# Patient Record
Sex: FEMALE | Race: BLACK OR AFRICAN AMERICAN | Employment: UNEMPLOYED | URBAN - METROPOLITAN AREA
[De-identification: names, ages, dates, MRNs, and addresses within clinical notes are randomized per-mention and may not be internally consistent; named-entity substitution may affect disease eponyms.]

---

## 2017-01-09 ENCOUNTER — HOSPITAL ENCOUNTER (OUTPATIENT)
Dept: RADIOLOGY | Facility: MEDICAL CENTER | Age: 42
End: 2017-01-09
Attending: NURSE PRACTITIONER
Payer: MEDICAID

## 2017-01-09 DIAGNOSIS — M67.472 GANGLION CYST OF LEFT FOOT: ICD-10-CM

## 2017-01-09 DIAGNOSIS — M79.672 LEFT FOOT PAIN: ICD-10-CM

## 2017-01-09 DIAGNOSIS — M06.9 RHEUMATOID ARTHRITIS INVOLVING LEFT FOOT, UNSPECIFIED RHEUMATOID FACTOR PRESENCE: ICD-10-CM

## 2017-01-09 PROCEDURE — A9579 GAD-BASE MR CONTRAST NOS,1ML: HCPCS | Performed by: NURSE PRACTITIONER

## 2017-01-09 PROCEDURE — 700117 HCHG RX CONTRAST REV CODE 255: Performed by: NURSE PRACTITIONER

## 2017-01-09 PROCEDURE — 73720 MRI LWR EXTREMITY W/O&W/DYE: CPT | Mod: LT

## 2017-01-09 RX ADMIN — GADODIAMIDE 15 ML: 287 INJECTION INTRAVENOUS at 09:35

## 2017-05-18 ENCOUNTER — APPOINTMENT (OUTPATIENT)
Dept: RADIOLOGY | Facility: MEDICAL CENTER | Age: 42
End: 2017-05-18
Attending: EMERGENCY MEDICINE
Payer: MEDICAID

## 2017-05-18 ENCOUNTER — HOSPITAL ENCOUNTER (EMERGENCY)
Facility: MEDICAL CENTER | Age: 42
End: 2017-05-18
Attending: EMERGENCY MEDICINE
Payer: MEDICAID

## 2017-05-18 VITALS
SYSTOLIC BLOOD PRESSURE: 110 MMHG | TEMPERATURE: 97.9 F | OXYGEN SATURATION: 100 % | DIASTOLIC BLOOD PRESSURE: 72 MMHG | BODY MASS INDEX: 20.37 KG/M2 | HEIGHT: 71 IN | WEIGHT: 145.5 LBS | RESPIRATION RATE: 16 BRPM | HEART RATE: 98 BPM

## 2017-05-18 DIAGNOSIS — M06.9 RHEUMATOID ARTHRITIS INVOLVING MULTIPLE SITES, UNSPECIFIED RHEUMATOID FACTOR PRESENCE: ICD-10-CM

## 2017-05-18 DIAGNOSIS — R22.32 FOREARM MASS, LEFT: ICD-10-CM

## 2017-05-18 PROCEDURE — 700102 HCHG RX REV CODE 250 W/ 637 OVERRIDE(OP): Performed by: EMERGENCY MEDICINE

## 2017-05-18 PROCEDURE — 73070 X-RAY EXAM OF ELBOW: CPT | Mod: LT

## 2017-05-18 PROCEDURE — 99284 EMERGENCY DEPT VISIT MOD MDM: CPT

## 2017-05-18 RX ORDER — METHYLPREDNISOLONE 4 MG/1
TABLET ORAL
Qty: 30 TAB | Refills: 0 | Status: SHIPPED | OUTPATIENT
Start: 2017-05-18 | End: 2017-06-15

## 2017-05-18 RX ORDER — METHYLPREDNISOLONE 4 MG/1
4 TABLET ORAL DAILY
Status: DISCONTINUED | OUTPATIENT
Start: 2017-05-18 | End: 2017-05-18 | Stop reason: HOSPADM

## 2017-05-18 RX ADMIN — METHYLPREDNISOLONE 4 MG: 4 TABLET ORAL at 10:02

## 2017-05-18 ASSESSMENT — PAIN SCALES - GENERAL
PAINLEVEL_OUTOF10: 4
PAINLEVEL_OUTOF10: 9

## 2017-05-18 NOTE — ED AVS SNAPSHOT
5/18/2017    Isabell Serrano  [address] [facility]  [place] [address]    Dear Isabell:    On license of [facility] wants to ensure your discharge home is safe and you or your loved ones have had all of your questions answered regarding your care after you leave the hospital.    Below is a list of resources and contact information should you have any questions regarding your hospital stay, follow-up instructions, or active medical symptoms.    Questions or Concerns Regarding… Contact   Medical Questions Related to Your Discharge  (7 days a week, 8am-5pm) Contact a Nurse Care Coordinator   [phone]   Medical Questions Not Related to Your Discharge  (24 hours a day / 7 days a week)  Contact the Nurse Health Line   [phone]    Medications or Discharge Instructions Refer to your discharge packet   or contact your [facility] Primary Care Provider   [phone]   Follow-up Appointment(s) Schedule your appointment via UrgentRx   or contact Scheduling [phone]   Billing Review your statement via UrgentRx  or contact Billing [phone]   Medical Records Review your records via UrgentRx   or contact Medical Records [phone]     You may receive a telephone call within two days of discharge. This call is to make certain you understand your discharge instructions and have the opportunity to have any questions answered. You can also easily access your medical information, test results and upcoming appointments via the UrgentRx free online health management tool. You can learn more and sign up at [employer]/UrgentRx. For assistance setting up your UrgentRx account, please call [phone].    Once again, we want to ensure your discharge home is safe and that you have a clear understanding of any next steps in your care. If you have any questions or concerns, please do not hesitate to contact us, we are here for you. Thank you for choosing [facility] for your healthcare needs.    Sincerely,    Your [facility] Healthcare Team

## 2017-05-18 NOTE — ED NOTES
Pt amb to triage.  Chief Complaint   Patient presents with   • Medication Refill     methylprednisolone 4mg   • Elbow Pain     left, +lump x1mo     States she does not have a current RA MD. Trying to get set up thru [facility].

## 2017-05-18 NOTE — ED AVS SNAPSHOT
Home Care Instructions                                                                                                                Isabell Serrano   MRN: [id number]    Department:  [facility], Emergency Dept   Date of Visit:  5/18/2017            [facility], Emergency Dept    [address] [place] [address]    Phone:  [phone]      You were seen by     James Hines M.D.      Your Diagnosis Was     Rheumatoid arthritis involving multiple sites, unspecified rheumatoid factor presence (CMS-HCC)     M06.9       These are the medications you received during your hospitalization from 05/18/2017 0848 to 05/18/2017 1051     Date/Time Order Dose Route Action    05/18/2017 1002 methylPREDNISolone (MEDROL) tablet 4 mg 4 mg Oral Given      Follow-up Information     1. Follow up with [facility], Emergency Dept.    Specialty:  Emergency Medicine    Why:  If symptoms worsen    Contact information    [address] [place] [address] [phone]        2. Schedule an appointment as soon as possible for a visit with Antonio Ruelas M.D..    Specialty:  Rheumatology    Why:  rheumatologist    Contact information    [address]  [facility] [id number] [phone]          3. Schedule an appointment as soon as possible for a visit with [facility].    Contact information    [address] [address] [phone]      Medication Information     Review all of your home medications and newly ordered medications with your primary doctor and/or pharmacist as soon as possible. Follow medication instructions as directed by your doctor and/or pharmacist.     Please keep your complete medication list with you and share with your physician. Update the information when medications are discontinued, doses are changed, or new medications (including over-the-counter products) are added; and carry medication  information at all times in the event of emergency situations.               Medication List      ASK your doctor about these medications        Instructions    Morning Afternoon Evening Bedtime    hydroxychloroquine 200 MG Tabs   Commonly known as:  PLAQUENIL        Take 2 Tabs by mouth every day.   Dose:  400 mg                        methimazole 5 MG Tabs   Commonly known as:  TAPAZOLE        Take 1 Tab by mouth 2 Times a Day.   Dose:  5 mg                        * methylPREDNISolone 4 MG Tabs   What changed:  Another medication with the same name was added. Make sure you understand how and when to take each.   Last time this was given:  4 mg on 5/18/2017 10:02 AM   Commonly known as:  MEDROL   Ask about: Which instructions should I use?        Take 1 Tab by mouth every day. TAKE 1 TAB BY MOUTH EVERY DAY.   Dose:  4 mg                        * methylPREDNISolone 4 MG Tabs   What changed:  Another medication with the same name was added. Make sure you understand how and when to take each.   Last time this was given:  4 mg on 5/18/2017 10:02 AM   Commonly known as:  MEDROL   Ask about: Which instructions should I use?        Take 1 Tab by mouth every day.   Dose:  4 mg                        * methylPREDNISolone 4 MG Tabs   What changed:  You were already taking a medication with the same name, and this prescription was added. Make sure you understand how and when to take each.   Last time this was given:  4 mg on 5/18/2017 10:02 AM   Commonly known as:  MEDROL   Ask about: Which instructions should I use?        Take 1 tab orally each day                        * Notice:  This list has 3 medication(s) that are the same as other medications prescribed for you. Read the directions carefully, and ask your doctor or other care provider to review them with you.         Where to Get Your Medications      These medications were sent to [facility][id number] - [place] - [address]  [address]  [address]     Phone:  [phone]    - methylPREDNISolone 4 MG Tabs            Procedures and tests performed during your visit     DX-ELBOW-LIMITED 2- LEFT        Discharge Instructions       Rheumatoid Arthritis  Rheumatoid arthritis is a long-term (chronic) inflammatory disease that causes pain, swelling, and stiffness of the joints. It can affect the entire body, including the eyes and lungs. The effects of rheumatoid arthritis vary widely among those with the condition.  CAUSES  The cause of rheumatoid arthritis is not known. It tends to run in families and is more common in women. Certain cells of the body's natural defense system (immune system) do not work properly and begin to attack healthy joints. It primarily involves the connective tissue that lines the joints (synovial membrane). This can cause damage to the joint.  SYMPTOMS  · Pain, stiffness, swelling, and decreased motion of many joints, especially in the hands and feet.  · Stiffness that is worse in the morning. It may last 1-2 hours or longer.  · Numbness and tingling in the hands.  · Fatigue.  · Loss of appetite.  · Weight loss.  · Low-grade fever.  · Dry eyes and mouth.  · Firm lumps (rheumatoid nodules) that grow beneath the skin in areas such as the elbows and hands.  DIAGNOSIS  Diagnosis is based on the symptoms described, an exam, and blood tests. Sometimes, X-rays are helpful.  TREATMENT  The goals of treatment are to relieve pain, reduce inflammation, and to slow down or stop joint damage and disability. Methods vary and may include:  · Maintaining a balance of rest, exercise, and proper nutrition.  · Your health care provider may adjust your medicines every 3 months until treatment goals are reached. Common medicines include:  ¨ Pain relievers (analgesics).  ¨ Corticosteroids and nonsteroidal anti-inflammatory drugs (NSAIDs) to reduce inflammation.  ¨ Disease-modifying antirheumatic drugs (DMARDs) to try to slow the course of the  disease.  ¨ Biologic response modifiers to reduce inflammation and damage.  · Physical therapy and occupational therapy.  · Surgery for patients with severe joint damage. Joint replacement or fusing of joints may be needed.  · Routine monitoring and ongoing care, such as office visits, blood and urine tests, and X-rays.  Your health care provider will work with you to identify the best treatment option for you, based on an assessment of the overall disease activity in your body.  HOME CARE INSTRUCTIONS  · Remain physically active and reduce activity when the disease gets worse.  · Eat a well-balanced diet.  · Put heat on affected joints when you wake up and before activities. Keep the heat on the affected joint for as long as directed by your health care provider.  · Put ice on affected joints following activities or exercising.  ¨ Put ice in a plastic bag.  ¨ Place a towel between your skin and the bag.  ¨ Leave the ice on for 15-20 minutes, 3-4 times per day, or as directed by your health care provider.  · Take medicines and supplements only as directed by your health care provider.  · Use splints as directed by your health care provider. Splints help maintain joint position and function.  · Do not sleep with pillows under your knees. This may lead to spasms.  · Participate in a self-management program to keep current with the latest treatment and coping skills.  SEEK IMMEDIATE MEDICAL CARE IF:  · You have fainting episodes.  · You have periods of extreme weakness.  · You rapidly develop a hot, painful joint that is more severe than usual joint aches.  · You have chills.  · You have a fever.  FOR MORE INFORMATION  · American College of Rheumatology: www.rheumatology.org  · Arthritis Foundation: www.arthritis.org     This information is not intended to replace advice given to you by your health care provider. Make sure you discuss any questions you have with your health care provider.     Document Released:  12/15/2001 Document Revised: 01/08/2016 Document Reviewed: 01/23/2013  Elsevier Interactive Patient Education ©2016 Elsevier Inc.            Patient Information     Patient Information    Following emergency treatment: all patient requiring follow-up care must return either to a private physician or a clinic if your condition worsens before you are able to obtain further medical attention, please return to the emergency room.     Billing Information    At [facility], we work to make the billing process streamlined for our patients.  Our Representatives are here to answer any questions you may have regarding your hospital bill.  If you have insurance coverage and have supplied your insurance information to us, we will submit a claim to your insurer on your behalf.  Should you have any questions regarding your bill, we can be reached online or by phone as follows:  Online: You are able pay your bills online or live chat with our representatives about any billing questions you may have. We are here to help Monday - Friday from 8:00am to 7:30pm and 9:00am - 12:00pm on Saturdays.  Please visit [email or web address]  for more information.   Phone:  [phone] or [phone]    Please note that your emergency physician, surgeon, pathologist, radiologist, anesthesiologist, and other specialists are not employed by [facility] and will therefore bill separately for their services.  Please contact them directly for any questions concerning their bills at the numbers below:     Emergency Physician Services:  [phone]  [facility]:  [phone]  [facility]:  [phone]  [facility]:  [phone]    1. Your final bill may vary from the amount quoted upon discharge if all procedures are not complete at that time, or if your doctor has additional procedures of which we are not aware. You will receive an additional bill if you return to the  Emergency Department at [facility] for suture removal regardless of the facility of which the sutures were placed.     2. Please arrange for settlement of this account at the emergency registration.    3. All self-pay accounts are due in full at the time of treatment.  If you are unable to meet this obligation then payment is expected within 4-5 days.     4. If you have had radiology studies (CT, X-ray, Ultrasound, MRI), you have received a preliminary result during your emergency department visit. Please contact the radiology department [phone] to receive a copy of your final result. Please discuss the Final result with your primary physician or with the follow up physician provided.     Crisis Hotline:  [place] Crisis Hotline:  0-600-KEXRXZI or 1-601.345.4964  [place] Crisis Hotline:    1-883.156.4188 or 852-405-3294         ED Discharge Follow Up Questions    1. In order to provide you with very good care, we would like to follow up with a phone call in the next few days.  May we have your permission to contact you?     YES /  NO    2. What is the best phone number to call you? (       )_____-__________    3. What is the best time to call you?      Morning  /  Afternoon  /  Evening                   Patient Signature:  ____________________________________________________________    Date:  ____________________________________________________________

## 2017-05-18 NOTE — DISCHARGE INSTRUCTIONS
Rheumatoid Arthritis  Rheumatoid arthritis is a long-term (chronic) inflammatory disease that causes pain, swelling, and stiffness of the joints. It can affect the entire body, including the eyes and lungs. The effects of rheumatoid arthritis vary widely among those with the condition.  CAUSES  The cause of rheumatoid arthritis is not known. It tends to run in families and is more common in women. Certain cells of the body's natural defense system (immune system) do not work properly and begin to attack healthy joints. It primarily involves the connective tissue that lines the joints (synovial membrane). This can cause damage to the joint.  SYMPTOMS  · Pain, stiffness, swelling, and decreased motion of many joints, especially in the hands and feet.  · Stiffness that is worse in the morning. It may last 1-2 hours or longer.  · Numbness and tingling in the hands.  · Fatigue.  · Loss of appetite.  · Weight loss.  · Low-grade fever.  · Dry eyes and mouth.  · Firm lumps (rheumatoid nodules) that grow beneath the skin in areas such as the elbows and hands.  DIAGNOSIS  Diagnosis is based on the symptoms described, an exam, and blood tests. Sometimes, X-rays are helpful.  TREATMENT  The goals of treatment are to relieve pain, reduce inflammation, and to slow down or stop joint damage and disability. Methods vary and may include:  · Maintaining a balance of rest, exercise, and proper nutrition.  · Your health care provider may adjust your medicines every 3 months until treatment goals are reached. Common medicines include:  ¨ Pain relievers (analgesics).  ¨ Corticosteroids and nonsteroidal anti-inflammatory drugs (NSAIDs) to reduce inflammation.  ¨ Disease-modifying antirheumatic drugs (DMARDs) to try to slow the course of the disease.  ¨ Biologic response modifiers to reduce inflammation and damage.  · Physical therapy and occupational therapy.  · Surgery for patients with severe joint damage. Joint replacement or fusing of  joints may be needed.  · Routine monitoring and ongoing care, such as office visits, blood and urine tests, and X-rays.  Your health care provider will work with you to identify the best treatment option for you, based on an assessment of the overall disease activity in your body.  HOME CARE INSTRUCTIONS  · Remain physically active and reduce activity when the disease gets worse.  · Eat a well-balanced diet.  · Put heat on affected joints when you wake up and before activities. Keep the heat on the affected joint for as long as directed by your health care provider.  · Put ice on affected joints following activities or exercising.  ¨ Put ice in a plastic bag.  ¨ Place a towel between your skin and the bag.  ¨ Leave the ice on for 15-20 minutes, 3-4 times per day, or as directed by your health care provider.  · Take medicines and supplements only as directed by your health care provider.  · Use splints as directed by your health care provider. Splints help maintain joint position and function.  · Do not sleep with pillows under your knees. This may lead to spasms.  · Participate in a self-management program to keep current with the latest treatment and coping skills.  SEEK IMMEDIATE MEDICAL CARE IF:  · You have fainting episodes.  · You have periods of extreme weakness.  · You rapidly develop a hot, painful joint that is more severe than usual joint aches.  · You have chills.  · You have a fever.  FOR MORE INFORMATION  · American College of Rheumatology: www.rheumatology.org  · Arthritis Foundation: www.arthritis.org     This information is not intended to replace advice given to you by your health care provider. Make sure you discuss any questions you have with your health care provider.     Document Released: 12/15/2001 Document Revised: 01/08/2016 Document Reviewed: 01/23/2013  Elsevier Interactive Patient Education ©2016 [employer]

## 2017-05-18 NOTE — ED AVS SNAPSHOT
ROBLOX Access Code: [email or web address]  Expires: 6/17/2017  9:47 AM    ROBLOX  A secure, online tool to manage your health information     [employer]’s ROBLOX® is a secure, online tool that connects you to your personalized health information from the privacy of your home -- day or night - making it very easy for you to manage your healthcare. Once the activation process is completed, you can even access your medical information using the ROBLOX marisa, which is available for free in the Apple Marisa store or Google Play store.     ROBLOX provides the following levels of access (as shown below):   My Chart Features   [facility] Primary Care Doctor [facility]  Specialists [facility]  Urgent  Care Non-[facility]  Primary Care  Doctor   Email your healthcare team securely and privately 24/7 X X X X   Manage appointments: schedule your next appointment; view details of past/upcoming appointments X      Request prescription refills. X      View recent personal medical records, including lab and immunizations X X X X   View health record, including health history, allergies, medications X X X X   Read reports about your outpatient visits, procedures, consult and ER notes X X X X   See your discharge summary, which is a recap of your hospital and/or ER visit that includes your diagnosis, lab results, and care plan. X X       How to register for ROBLOX:  1. Go to  [email or web address].  2. Click on the Sign Up Now box, which takes you to the New Member Sign Up page. You will need to provide the following information:  a. Enter your ROBLOX Access Code exactly as it appears at the top of this page. (You will not need to use this code after you’ve completed the sign-up process. If you do not sign up before the expiration date, you must request a new code.)   b. Enter your date of birth.   c. Enter your home email address.   d. Click Submit, and follow the next screen’s instructions.  3. Create a ROBLOX ID. This will be your ROBLOX  login ID and cannot be changed, so think of one that is secure and easy to remember.  4. Create a [employer] password. You can change your password at any time.  5. Enter your Password Reset Question and Answer. This can be used at a later time if you forget your password.   6. Enter your e-mail address. This allows you to receive e-mail notifications when new information is available in [employer].  7. Click Sign Up. You can now view your health information.    For assistance activating your [employer] account, call [phone]

## 2017-05-18 NOTE — ED PROVIDER NOTES
"ED Provider Note    Scribed for James Hines M.D. by Lorna Costa. 5/18/2017, 9:16 AM.    Primary care provider: Rosa Sams M.D.  Means of arrival: Walk-in  History obtained from: Patient  History limited by: None    CHIEF COMPLAINT  Chief Complaint   Patient presents with   • Medication Refill     methylprednisolone 4mg   • Elbow Pain     left, +lump x1mo       HPI  Isabell Serrano is a 42 y.o. Female with a history of rheumatid arthritis who presents to the Emergency Department for a medication refill of methylprednisolone. She use to see Dr. Mathis for her rheumatoid arthritis but he recently passed away.The patient is having a difficult time trying to find a new physician since. She states that all of her pain is \"acting up and is very aggressive.\" The patient also complains of a lump to her left elbow but denies any trauma. She reports that the bump has been present for one month.     REVIEW OF SYSTEMS  Pertinent positives include lump to left elbow.   Pertinent negatives include no falls.      PAST MEDICAL HISTORY   has a past medical history of Arthritis, rheumatoid (CMS-[facility]); Arthritis; and RA (rheumatoid arthritis) (CMS-[facility]) (11/5/2013).    SURGICAL HISTORY   has past surgical history that includes mammoplasty augmentation (7/12/2011).    SOCIAL HISTORY  Social History   Substance Use Topics   • Smoking status: Never Smoker    • Smokeless tobacco: Never Used   • Alcohol Use: 0.5 oz/week     1 Standard drinks or equivalent per week      Comment: Once a month      History   Drug Use No       FAMILY HISTORY  Family History   Problem Relation Age of Onset   • Cancer Father      throat/prostate   • Rheumatologic Disease Paternal Aunt    • Other Paternal Aunt      MS   • Rheumatologic Disease Paternal Aunt    • Arthritis Paternal Aunt      RA       CURRENT MEDICATIONS  Home Medications     Reviewed by Maria Guadalupe Glynn R.N. (Registered Nurse) on 05/18/17 at 0914  Med List Status: Not " "Addressed    Medication Last Dose Status    hydroxychloroquine (PLAQUENIL) 200 MG Tab  Active    methimazole (TAPAZOLE) 5 MG Tab 5/18/2017 Active    methylPREDNISolone (MEDROL) 4 MG Tab 5/10/2017 Active    methylPREDNISolone (MEDROL) 4 MG Tab  Active                ALLERGIES  No Known Allergies    PHYSICAL EXAM  VITAL SIGNS: /79 mmHg  Pulse 110  Temp(Src) 36.6 °C (97.9 °F)  Resp 16  Ht 1.803 m (5' 11\")  Wt 66 kg (145 lb 8.1 oz)  BMI 20.30 kg/m2  SpO2 99%  LMP 05/07/2017    Nursing note and vitals reviewed.  Constitutional: No distress.   HENT: Head is atraumatic. Oropharynx is moist.   Eyes: Conjunctivae are normal. Pupils are equal, round, and reactive to light.   Cardiovascular: Normal peripheral perfusion  Respiratory: No respiratory distress.   Musculoskeletal: Normal range of motion. Firm, bony, prominence just distal to olecranon, non-tender, no erythema, no flexions.   Neurological: Alert. No focal deficits noted.    Skin: No rash.   Psych: Appropriate for clinical situation     DIAGNOSTIC STUDIES / PROCEDURES    RADIOLOGY  DX-ELBOW-LIMITED 2- LEFT   Final Result      No evidence of erosive arthropathy.      Elbow joint effusion.      Focal soft tissue swelling of the proximal dorsal forearm.        The radiologist's interpretation of all radiological studies have been reviewed by me.    COURSE & MEDICAL DECISION MAKING  Nursing notes, VS, PMSFHx reviewed in chart.      9:16 AM - Patient seen and examined at bedside. Patient will be treated with 4mg Methylprednisolone. Ordered DX-elbow limited 2-left to evaluate her symptoms.      X-ray demonstrates no evidence of bony involvement. This appears to be a soft tissue mass. Possibly related to rheumatoid. I will refer the patient to a local dermatologist. In addition the patient is to establish primary care.        DISPOSITION:  The patient's PMPaware/narcotic history was reviewed prior to prescription - and there is no evidence of narcotic " abuse.    Patient will be discharged home in stable condition.             FOLLOW UP:  [facility], Emergency Dept  [address] [address] [phone]    If symptoms worsen    Antonio Ruelas M.D.  [address]  [address]  [place] [address]  [phone]    Schedule an appointment as soon as possible for a visit  rheumatologist    [address] [address]  [phone]  Schedule an appointment as soon as possible for a visit        OUTPATIENT MEDICATIONS:  Discharge Medication List as of 5/18/2017 10:51 AM      START taking these medications    Details   !! methylPREDNISolone (MEDROL) 4 MG Tab Take 1 tab orally each day, Disp-30 Tab, R-0, Normal       !! - Potential duplicate medications found. Please discuss with provider.          The patient was discharged home with an information sheet on rheumatoid arthritis and told to return immediately for any signs or symptoms listed.  The patient agreed to the discharge precautions and follow-up plan which is documented in EPIC.    FINAL IMPRESSION  1. Rheumatoid arthritis involving multiple sites, unspecified rheumatoid factor presence (CMS-[facility])    2. Forearm mass, left          Lorna ESQUEDA (Scribe), am scribing for, and in the presence of, James Hines M.D..    Electronically signed by: Lorna Costa (Scribe), 5/18/2017    James ESQUEDA M.D. personally performed the services described in this documentation, as scribed by Lorna Costa in my presence, and it is both accurate and complete.    The note accurately reflects work and decisions made by me.  James Hines  5/18/2017  2:45 PM

## 2017-05-19 ENCOUNTER — PATIENT OUTREACH (OUTPATIENT)
Dept: HEALTH INFORMATION MANAGEMENT | Facility: OTHER | Age: 42
End: 2017-05-19

## 2017-05-19 NOTE — PROGRESS NOTES
· Placed discharge outreach phone call to patient s/p ER discharge 5/18/17.  Left voicemail with my contact information and instructions to return my phone call.

## 2017-06-15 ENCOUNTER — HOSPITAL ENCOUNTER (EMERGENCY)
Facility: MEDICAL CENTER | Age: 42
End: 2017-06-15
Attending: EMERGENCY MEDICINE
Payer: MEDICAID

## 2017-06-15 ENCOUNTER — PATIENT OUTREACH (OUTPATIENT)
Dept: HEALTH INFORMATION MANAGEMENT | Facility: OTHER | Age: 42
End: 2017-06-15

## 2017-06-15 VITALS
HEART RATE: 105 BPM | BODY MASS INDEX: 20.96 KG/M2 | RESPIRATION RATE: 18 BRPM | OXYGEN SATURATION: 98 % | DIASTOLIC BLOOD PRESSURE: 74 MMHG | SYSTOLIC BLOOD PRESSURE: 119 MMHG | WEIGHT: 146.39 LBS | HEIGHT: 70 IN | TEMPERATURE: 98.2 F

## 2017-06-15 DIAGNOSIS — M05.9 SEROPOSITIVE RHEUMATOID ARTHRITIS (HCC): ICD-10-CM

## 2017-06-15 DIAGNOSIS — Z76.0 MEDICATION REFILL: ICD-10-CM

## 2017-06-15 DIAGNOSIS — F41.8 SITUATIONAL ANXIETY: ICD-10-CM

## 2017-06-15 PROCEDURE — 99283 EMERGENCY DEPT VISIT LOW MDM: CPT

## 2017-06-15 RX ORDER — METHYLPREDNISOLONE 4 MG/1
4 TABLET ORAL
Qty: 30 TAB | Refills: 0 | Status: SHIPPED | OUTPATIENT
Start: 2017-06-15 | End: 2019-02-09

## 2017-06-15 ASSESSMENT — PAIN SCALES - GENERAL: PAINLEVEL_OUTOF10: 10

## 2017-06-15 NOTE — ED AVS SNAPSHOT
Venuemob Access Code: [email or web address]  Expires: 6/17/2017  9:47 AM    Venuemob  A secure, online tool to manage your health information     [email or web address]’s Venuemob® is a secure, online tool that connects you to your personalized health information from the privacy of your home -- day or night - making it very easy for you to manage your healthcare. Once the activation process is completed, you can even access your medical information using the Venuemob marisa, which is available for free in the Apple Marisa store or Google Play store.     Venuemob provides the following levels of access (as shown below):   My Chart Features   [facility] Primary Care Doctor [facility]  Specialists [facility]  Urgent  Care Non-[facility]  Primary Care  Doctor   Email your healthcare team securely and privately 24/7 X X X X   Manage appointments: schedule your next appointment; view details of past/upcoming appointments X      Request prescription refills. X      View recent personal medical records, including lab and immunizations X X X X   View health record, including health history, allergies, medications X X X X   Read reports about your outpatient visits, procedures, consult and ER notes X X X X   See your discharge summary, which is a recap of your hospital and/or ER visit that includes your diagnosis, lab results, and care plan. X X       How to register for Venuemob:  1. Go to  [email or web address].  2. Click on the Sign Up Now box, which takes you to the New Member Sign Up page. You will need to provide the following information:  a. Enter your Venuemob Access Code exactly as it appears at the top of this page. (You will not need to use this code after you’ve completed the sign-up process. If you do not sign up before the expiration date, you must request a new code.)   b. Enter your date of birth.   c. Enter your home email address.   d. Click Submit, and follow the next screen’s instructions.  3. Create a Venuemob ID. This will be your Venuemob  login ID and cannot be changed, so think of one that is secure and easy to remember.  4. Create a [employer]ed password. You can change your password at any time.  5. Enter your Password Reset Question and Answer. This can be used at a later time if you forget your password.   6. Enter your e-mail address. This allows you to receive e-mail notifications when new information is available in Filecubed.  7. Click Sign Up. You can now view your health information.    For assistance activating your Filecubed account, call [phone]

## 2017-06-15 NOTE — ED NOTES
"Pt has hx of RA. Ran out of Solumedrol last Friday. C/O increase in generalized pain. \"It was so bad yesterday I took like 16 Aleve just to get through.\" States no current PCP in the area.   "

## 2017-06-15 NOTE — DISCHARGE INSTRUCTIONS
Follow-up with the doctor as scheduled.  I think he needed an outpatient workup and to be tapered off her steroids    Medicine Refill at the Emergency Department  We have refilled your medicine today, but it is best for you to get refills through your primary health care provider's office. In the future, please plan ahead so you do not need to get refills from the emergency department.  If the medicine we refilled was a maintenance medicine, you may have received only enough to get you by until you are able to see your regular health care provider.     This information is not intended to replace advice given to you by your health care provider. Make sure you discuss any questions you have with your health care provider.     Document Released: 04/05/2005 Document Revised: 01/08/2016 Document Reviewed: 03/27/2015  [employer] Interactive Patient Education ©2016 [employer]      Rheumatoid Arthritis  Rheumatoid arthritis is a long-term (chronic) inflammatory disease that causes pain, swelling, and stiffness of the joints. It can affect the entire body, including the eyes and lungs. The effects of rheumatoid arthritis vary widely among those with the condition.  CAUSES  The cause of rheumatoid arthritis is not known. It tends to run in families and is more common in women. Certain cells of the body's natural defense system (immune system) do not work properly and begin to attack healthy joints. It primarily involves the connective tissue that lines the joints (synovial membrane). This can cause damage to the joint.  SYMPTOMS  · Pain, stiffness, swelling, and decreased motion of many joints, especially in the hands and feet.  · Stiffness that is worse in the morning. It may last 1-2 hours or longer.  · Numbness and tingling in the hands.  · Fatigue.  · Loss of appetite.  · Weight loss.  · Low-grade fever.  · Dry eyes and mouth.  · Firm lumps (rheumatoid nodules) that grow beneath the skin in areas such as the elbows and  hands.  DIAGNOSIS  Diagnosis is based on the symptoms described, an exam, and blood tests. Sometimes, X-rays are helpful.  TREATMENT  The goals of treatment are to relieve pain, reduce inflammation, and to slow down or stop joint damage and disability. Methods vary and may include:  · Maintaining a balance of rest, exercise, and proper nutrition.  · Your health care provider may adjust your medicines every 3 months until treatment goals are reached. Common medicines include:  ¨ Pain relievers (analgesics).  ¨ Corticosteroids and nonsteroidal anti-inflammatory drugs (NSAIDs) to reduce inflammation.  ¨ Disease-modifying antirheumatic drugs (DMARDs) to try to slow the course of the disease.  ¨ Biologic response modifiers to reduce inflammation and damage.  · Physical therapy and occupational therapy.  · Surgery for patients with severe joint damage. Joint replacement or fusing of joints may be needed.  · Routine monitoring and ongoing care, such as office visits, blood and urine tests, and X-rays.  Your health care provider will work with you to identify the best treatment option for you, based on an assessment of the overall disease activity in your body.  HOME CARE INSTRUCTIONS  · Remain physically active and reduce activity when the disease gets worse.  · Eat a well-balanced diet.  · Put heat on affected joints when you wake up and before activities. Keep the heat on the affected joint for as long as directed by your health care provider.  · Put ice on affected joints following activities or exercising.  ¨ Put ice in a plastic bag.  ¨ Place a towel between your skin and the bag.  ¨ Leave the ice on for 15-20 minutes, 3-4 times per day, or as directed by your health care provider.  · Take medicines and supplements only as directed by your health care provider.  · Use splints as directed by your health care provider. Splints help maintain joint position and function.  · Do not sleep with pillows under your knees. This  may lead to spasms.  · Participate in a self-management program to keep current with the latest treatment and coping skills.  SEEK IMMEDIATE MEDICAL CARE IF:  · You have fainting episodes.  · You have periods of extreme weakness.  · You rapidly develop a hot, painful joint that is more severe than usual joint aches.  · You have chills.  · You have a fever.  FOR MORE INFORMATION  · American College of Rheumatology: www.rheumatology.org  · Arthritis Foundation: www.arthritis.org     This information is not intended to replace advice given to you by your health care provider. Make sure you discuss any questions you have with your health care provider.     Document Released: 12/15/2001 Document Revised: 01/08/2016 Document Reviewed: 01/23/2013  [employer] Interactive Patient Education ©2016 [employer] Inc.

## 2017-06-15 NOTE — ED AVS SNAPSHOT
Home Care Instructions                                                                                                                Isabell Serrano   MRN: [id number]    Department:  [facility], Emergency Dept   Date of Visit:  6/15/2017            [facility], Emergency Dept    [address]    [place] [address]    Phone:  [phone]      You were seen by     Rigoberto Steward M.D.      Your Diagnosis Was     Medication refill     Z76.0       Follow-up Information     1. Follow up with Sherrill Moffett . Go on 6/23/2017.    Why:  Please arrive at 8:30am for a 9:00am appointment. Bring Photo ID, Insurance card and all medications. Thank you    Contact information    [place] [address]  [place], [place] [address]  [phone]      Medication Information     Review all of your home medications and newly ordered medications with your primary doctor and/or pharmacist as soon as possible. Follow medication instructions as directed by your doctor and/or pharmacist.     Please keep your complete medication list with you and share with your physician. Update the information when medications are discontinued, doses are changed, or new medications (including over-the-counter products) are added; and carry medication information at all times in the event of emergency situations.               Medication List      CONTINUE taking these medications        Instructions    Morning Afternoon Evening Bedtime    methylPREDNISolone 4 MG Tabs   Commonly known as:  MEDROL        Take 1 Tab by mouth every day. TAKE 1 TAB BY MOUTH EVERY DAY.   Dose:  4 mg                          ASK your doctor about these medications        Instructions    Morning Afternoon Evening Bedtime    hydroxychloroquine 200 MG Tabs   Commonly known as:  PLAQUENIL        Take 2 Tabs by mouth every day.   Dose:  400 mg                        methimazole 5 MG Tabs   Commonly known  as:  TAPAZOLE        Take 1 Tab by mouth 2 Times a Day.   Dose:  5 mg                             Where to Get Your Medications      You can get these medications from any pharmacy     Bring a paper prescription for each of these medications    - methylPREDNISolone 4 MG Tabs              Discharge Instructions       Follow-up with the doctor as scheduled.  I think he needed an outpatient workup and to be tapered off her steroids    Medicine Refill at the Emergency Department  We have refilled your medicine today, but it is best for you to get refills through your primary health care provider's office. In the future, please plan ahead so you do not need to get refills from the emergency department.  If the medicine we refilled was a maintenance medicine, you may have received only enough to get you by until you are able to see your regular health care provider.     This information is not intended to replace advice given to you by your health care provider. Make sure you discuss any questions you have with your health care provider.     Document Released: 04/05/2005 Document Revised: 01/08/2016 Document Reviewed: 03/27/2015  [employer] Interactive Patient Education ©2016 [employer]      Rheumatoid Arthritis  Rheumatoid arthritis is a long-term (chronic) inflammatory disease that causes pain, swelling, and stiffness of the joints. It can affect the entire body, including the eyes and lungs. The effects of rheumatoid arthritis vary widely among those with the condition.  CAUSES  The cause of rheumatoid arthritis is not known. It tends to run in families and is more common in women. Certain cells of the body's natural defense system (immune system) do not work properly and begin to attack healthy joints. It primarily involves the connective tissue that lines the joints (synovial membrane). This can cause damage to the joint.  SYMPTOMS  · Pain, stiffness, swelling, and decreased motion of many joints, especially in the  hands and feet.  · Stiffness that is worse in the morning. It may last 1-2 hours or longer.  · Numbness and tingling in the hands.  · Fatigue.  · Loss of appetite.  · Weight loss.  · Low-grade fever.  · Dry eyes and mouth.  · Firm lumps (rheumatoid nodules) that grow beneath the skin in areas such as the elbows and hands.  DIAGNOSIS  Diagnosis is based on the symptoms described, an exam, and blood tests. Sometimes, X-rays are helpful.  TREATMENT  The goals of treatment are to relieve pain, reduce inflammation, and to slow down or stop joint damage and disability. Methods vary and may include:  · Maintaining a balance of rest, exercise, and proper nutrition.  · Your health care provider may adjust your medicines every 3 months until treatment goals are reached. Common medicines include:  ¨ Pain relievers (analgesics).  ¨ Corticosteroids and nonsteroidal anti-inflammatory drugs (NSAIDs) to reduce inflammation.  ¨ Disease-modifying antirheumatic drugs (DMARDs) to try to slow the course of the disease.  ¨ Biologic response modifiers to reduce inflammation and damage.  · Physical therapy and occupational therapy.  · Surgery for patients with severe joint damage. Joint replacement or fusing of joints may be needed.  · Routine monitoring and ongoing care, such as office visits, blood and urine tests, and X-rays.  Your health care provider will work with you to identify the best treatment option for you, based on an assessment of the overall disease activity in your body.  HOME CARE INSTRUCTIONS  · Remain physically active and reduce activity when the disease gets worse.  · Eat a well-balanced diet.  · Put heat on affected joints when you wake up and before activities. Keep the heat on the affected joint for as long as directed by your health care provider.  · Put ice on affected joints following activities or exercising.  ¨ Put ice in a plastic bag.  ¨ Place a towel between your skin and the bag.  ¨ Leave the ice on for  15-20 minutes, 3-4 times per day, or as directed by your health care provider.  · Take medicines and supplements only as directed by your health care provider.  · Use splints as directed by your health care provider. Splints help maintain joint position and function.  · Do not sleep with pillows under your knees. This may lead to spasms.  · Participate in a self-management program to keep current with the latest treatment and coping skills.  SEEK IMMEDIATE MEDICAL CARE IF:  · You have fainting episodes.  · You have periods of extreme weakness.  · You rapidly develop a hot, painful joint that is more severe than usual joint aches.  · You have chills.  · You have a fever.  FOR MORE INFORMATION  · American College of Rheumatology: www.rheumatology.org  · Arthritis Foundation: www.arthritis.org     This information is not intended to replace advice given to you by your health care provider. Make sure you discuss any questions you have with your health care provider.     Document Released: 12/15/2001 Document Revised: 01/08/2016 Document Reviewed: 01/23/2013  [employer] Interactive Patient Education ©2016 [employer]            Patient Information     Patient Information    Following emergency treatment: all patient requiring follow-up care must return either to a private physician or a clinic if your condition worsens before you are able to obtain further medical attention, please return to the emergency room.     Billing Information    At [facility], we work to make the billing process streamlined for our patients.  Our Representatives are here to answer any questions you may have regarding your hospital bill.  If you have insurance coverage and have supplied your insurance information to us, we will submit a claim to your insurer on your behalf.  Should you have any questions regarding your bill, we can be reached online or by phone as follows:  Online: You are able pay your bills online or live chat with our  representatives about any billing questions you may have. We are here to help Monday - Friday from 8:00am to 7:30pm and 9:00am - 12:00pm on Saturdays.  Please visit [email or web address]  for more information.   Phone:  [phone] or [phone]    Please note that your emergency physician, surgeon, pathologist, radiologist, anesthesiologist, and other specialists are not employed by [facility] and will therefore bill separately for their services.  Please contact them directly for any questions concerning their bills at the numbers below:     Emergency Physician Services:  [phone]  [facility]:  [phone]  [facility]:  [phone]  [facility]:  [phone]    1. Your final bill may vary from the amount quoted upon discharge if all procedures are not complete at that time, or if your doctor has additional procedures of which we are not aware. You will receive an additional bill if you return to the Emergency Department at [facility] for suture removal regardless of the facility of which the sutures were placed.     2. Please arrange for settlement of this account at the emergency registration.    3. All self-pay accounts are due in full at the time of treatment.  If you are unable to meet this obligation then payment is expected within 4-5 days.     4. If you have had radiology studies (CT, X-ray, Ultrasound, MRI), you have received a preliminary result during your emergency department visit. Please contact the radiology department [phone] to receive a copy of your final result. Please discuss the Final result with your primary physician or with the follow up physician provided.     Crisis Hotline:  [place] Crisis Hotline:  6-802-DVAWJHR or 1-504.784.7001  [place] Crisis Hotline:    1-145.329.6367 or 873-839-6082         ED Discharge Follow Up Questions    1. In order to provide you with very good care, we would  like to follow up with a phone call in the next few days.  May we have your permission to contact you?     YES /  NO    2. What is the best phone number to call you? (       )_____-__________    3. What is the best time to call you?      Morning  /  Afternoon  /  Evening                   Patient Signature:  ____________________________________________________________    Date:  ____________________________________________________________

## 2017-06-15 NOTE — ED AVS SNAPSHOT
6/15/2017    Isabell Serrano  [address]  [place] [address]    Dear Isaebll:    [facility] wants to ensure your discharge home is safe and you or your loved ones have had all of your questions answered regarding your care after you leave the hospital.    Below is a list of resources and contact information should you have any questions regarding your hospital stay, follow-up instructions, or active medical symptoms.    Questions or Concerns Regarding… Contact   Medical Questions Related to Your Discharge  (7 days a week, 8am-5pm) Contact a Nurse Care Coordinator   [phone]   Medical Questions Not Related to Your Discharge  (24 hours a day / 7 days a week)  Contact the Nurse Health Line   [phone]    Medications or Discharge Instructions Refer to your discharge packet   or contact your [facility] Primary Care Provider   [phone]   Follow-up Appointment(s) Schedule your appointment via Yolto   or contact Scheduling [phone]   Billing Review your statement via Yolto  or contact Billing [phone]   Medical Records Review your records via Yolto   or contact Medical Records [phone]     You may receive a telephone call within two days of discharge. This call is to make certain you understand your discharge instructions and have the opportunity to have any questions answered. You can also easily access your medical information, test results and upcoming appointments via the Yolto free online health management tool. You can learn more and sign up at [employer]/Yolto. For assistance setting up your Yolto account, please call [phone].    Once again, we want to ensure your discharge home is safe and that you have a clear understanding of any next steps in your care. If you have any questions or concerns, please do not hesitate to contact us, we are here for you. Thank you for choosing [facility] for your healthcare needs.    Sincerely,    Your [facility] Healthcare Team

## 2017-06-15 NOTE — ED PROVIDER NOTES
ED Provider Note    CHIEF COMPLAINT  Chief Complaint   Patient presents with   • Medication Refill   • Pain       HPI  Isabell Serrano is a 42 y.o. female who presents to the ER requesting medication refill.  The patient states that she has been having worsening pain from rheumatoid arthritis versus a chronic process.  Because she's of her medications and she is requesting a medication refill.  The patient states she's been on Medrol for some time for this.  She's been out for the last few days.  She has pain in her hands in her wrists and her forearms and her knees or elbows that she chronically does.  She denies any fevers or chills.  No weakness, no nausea, vomiting, diarrhea.  She has mild chronic and swelling, which is unchanged.  No edema.  Dis pregnancy.  She also expressed challenges with getting a primary care doctor or rheumatologist.      REVIEW OF SYSTEMS  See HPI for further details.  Constitutional: No fevers or chills.    PAST MEDICAL HISTORY  Past Medical History   Diagnosis Date   • Arthritis, rheumatoid (CMS-HCC)    • Arthritis    • RA (rheumatoid arthritis) (CMS-HCC) 11/5/2013       FAMILY HISTORY  Family History   Problem Relation Age of Onset   • Cancer Father      throat/prostate   • Rheumatologic Disease Paternal Aunt    • Other Paternal Aunt      MS   • Rheumatologic Disease Paternal Aunt    • Arthritis Paternal Aunt      RA       SOCIAL HISTORY  Social History     Social History   • Marital Status: Single     Spouse Name: N/A   • Number of Children: N/A   • Years of Education: N/A     Social History Main Topics   • Smoking status: Never Smoker    • Smokeless tobacco: Never Used   • Alcohol Use: 0.5 oz/week     1 Standard drinks or equivalent per week      Comment: Once a month   • Drug Use: No   • Sexual Activity:     Partners: Male     Other Topics Concern   • None     Social History Narrative       SURGICAL HISTORY  Past Surgical History   Procedure Laterality Date   • Mammoplasty  "augmentation  7/12/2011     Performed by MARIBELL GONZALES at SURGERY [facility]       CURRENT MEDICATIONS  Home Medications     Reviewed by Micheline Galdamez (Pharmacy Tech) on 06/15/17 at 1119  Med List Status: Complete    Medication Last Dose Status    hydroxychloroquine (PLAQUENIL) 200 MG Tab 6/14/2017 Active    methimazole (TAPAZOLE) 5 MG Tab 6/14/2017 Active    methylPREDNISolone (MEDROL) 4 MG Tab 6/9/2017 Active                ALLERGIES  No Known Allergies    PHYSICAL EXAM  VITAL SIGNS: /74 mmHg  Pulse 105  Temp(Src) 36.8 °C (98.2 °F)  Resp 18  Ht 1.778 m (5' 10\")  Wt 66.4 kg (146 lb 6.2 oz)  BMI 21.00 kg/m2  SpO2 98%  LMP 06/01/2017     Constitutional: Well developed, Well nourished, No acute distress, Non-toxic appearance.   HENT: Normocephalic, Atraumatic, Bilateral external ears normal, Oropharynx moist, No oral exudates, Nose normal.   Eyes: PERRL, EOMI, cosmetic colored contacts in place  Cardiovascular: Normal heart rate, Normal rhythm, No murmurs, No rubs, No gallops.   Thorax & Lungs: Normal breath sounds, No respiratory distress, No wheezing.   Abdomen: Bowel sounds normal, Soft, No tenderness,  Skin: Warm, Dry, No erythema, No rash.   Musculoskeletal: Mild edema in both hands.  No edema in the legs.  Good pulses.  Neurologic: Alert & oriented x 3 No focal deficits noted.   Psychiatric: Affect anxious    \    COURSE & MEDICAL DECISION MAKING  Pertinent Labs & Imaging studies reviewed. (See chart for details)  The patient reports only a relatively short history of being on methylprednisolone but chart reviewed back to 2013 shows this to be a chronic medication for her.    She's been nauseous for extended period of time trying to manage for rheumatologic disease.  At this point, I don't think it safe to stop it abruptly.  I do think she is likely developed some complications of his medications.  I'll prescribe her one-month supply and she is counseled.  She'll need to taper " off this and get on any medication.    The patient expresses concern and challenges with obtaining a primary care doctor or the ER schedule her scheduled a primary care doctor.  This will be arranged for her at the time of discharge.  She is to return to the ER for any medical problems or complaints.    Patient is not tachycardic on my examination, although she isn't tachycardic.  Heart rate documented.  This is pretty typical for her when she presented in an out of the ER.  At this point, she is counseled to follow up for outpatient workup.  I think she probably needs some labs and workup, but I don't think is an emergency.  She is discharged in good condition.    FINAL IMPRESSION  1. Medication refill    2. Situational anxiety      Addendum to clarify the common about the Aleve in the nurse's notes.  The patient states that she took 16 tablets of Aleve over the last several days.  Starting last Friday, 6 days ago.  Denies this is a single ingestion, denies taking more than prescribed any particular time.         Electronically signed by: Rigoberto Steward, 6/15/2017 12:13 PM

## 2017-06-15 NOTE — ED NOTES
DC instructions and prescription x1 given to pt. Pt also instructed on appropriate use of Aleve/Ibuprofen/Tylenol, and dangers of overmedicating. Pt instructed to follow-up with a primary care provider, and advised of community options. Pt verbalized understanding. Pt steady on feet with 0 s/s distress noted. Pt dcd home.

## 2017-11-30 ENCOUNTER — HOSPITAL ENCOUNTER (OUTPATIENT)
Dept: LAB | Facility: MEDICAL CENTER | Age: 42
End: 2017-11-30
Attending: FAMILY MEDICINE
Payer: MEDICAID

## 2017-11-30 LAB
ALBUMIN SERPL BCP-MCNC: 3.4 G/DL (ref 3.2–4.9)
ALBUMIN/GLOB SERPL: 0.8 G/DL
ALP SERPL-CCNC: 109 U/L (ref 30–99)
ALT SERPL-CCNC: 15 U/L (ref 2–50)
ANION GAP SERPL CALC-SCNC: 10 MMOL/L (ref 0–11.9)
AST SERPL-CCNC: 14 U/L (ref 12–45)
BASOPHILS # BLD AUTO: 0.2 % (ref 0–1.8)
BASOPHILS # BLD: 0.02 K/UL (ref 0–0.12)
BILIRUB SERPL-MCNC: 0.4 MG/DL (ref 0.1–1.5)
BUN SERPL-MCNC: 12 MG/DL (ref 8–22)
CALCIUM SERPL-MCNC: 10 MG/DL (ref 8.5–10.5)
CHLORIDE SERPL-SCNC: 103 MMOL/L (ref 96–112)
CO2 SERPL-SCNC: 22 MMOL/L (ref 20–33)
CREAT SERPL-MCNC: 0.29 MG/DL (ref 0.5–1.4)
CRP SERPL HS-MCNC: 19.9 MG/L (ref 0–7.5)
EOSINOPHIL # BLD AUTO: 0.15 K/UL (ref 0–0.51)
EOSINOPHIL NFR BLD: 1.5 % (ref 0–6.9)
ERYTHROCYTE [DISTWIDTH] IN BLOOD BY AUTOMATED COUNT: 42.5 FL (ref 35.9–50)
ERYTHROCYTE [SEDIMENTATION RATE] IN BLOOD BY WESTERGREN METHOD: 76 MM/HOUR (ref 0–20)
GFR SERPL CREATININE-BSD FRML MDRD: >60 ML/MIN/1.73 M 2
GLOBULIN SER CALC-MCNC: 4.3 G/DL (ref 1.9–3.5)
GLUCOSE SERPL-MCNC: 112 MG/DL (ref 65–99)
HCT VFR BLD AUTO: 41.8 % (ref 37–47)
HGB BLD-MCNC: 13.2 G/DL (ref 12–16)
IMM GRANULOCYTES # BLD AUTO: 0.03 K/UL (ref 0–0.11)
IMM GRANULOCYTES NFR BLD AUTO: 0.3 % (ref 0–0.9)
LYMPHOCYTES # BLD AUTO: 2.4 K/UL (ref 1–4.8)
LYMPHOCYTES NFR BLD: 23.4 % (ref 22–41)
MCH RBC QN AUTO: 22.5 PG (ref 27–33)
MCHC RBC AUTO-ENTMCNC: 31.6 G/DL (ref 33.6–35)
MCV RBC AUTO: 71.3 FL (ref 81.4–97.8)
MONOCYTES # BLD AUTO: 0.7 K/UL (ref 0–0.85)
MONOCYTES NFR BLD AUTO: 6.8 % (ref 0–13.4)
NEUTROPHILS # BLD AUTO: 6.95 K/UL (ref 2–7.15)
NEUTROPHILS NFR BLD: 67.8 % (ref 44–72)
NRBC # BLD AUTO: 0 K/UL
NRBC BLD AUTO-RTO: 0 /100 WBC
PLATELET # BLD AUTO: 595 K/UL (ref 164–446)
PMV BLD AUTO: 10.6 FL (ref 9–12.9)
POTASSIUM SERPL-SCNC: 4.1 MMOL/L (ref 3.6–5.5)
PROT SERPL-MCNC: 7.7 G/DL (ref 6–8.2)
RBC # BLD AUTO: 5.86 M/UL (ref 4.2–5.4)
RHEUMATOID FACT SER IA-ACNC: >360 IU/ML (ref 0–14)
SODIUM SERPL-SCNC: 135 MMOL/L (ref 135–145)
T3 SERPL-MCNC: 504.7 NG/DL (ref 60–181)
T4 FREE SERPL-MCNC: 4.57 NG/DL (ref 0.53–1.43)
THYROPEROXIDASE AB SERPL-ACNC: 244.3 IU/ML (ref 0–9)
TSH SERPL DL<=0.005 MIU/L-ACNC: 0.02 UIU/ML (ref 0.3–3.7)
WBC # BLD AUTO: 10.3 K/UL (ref 4.8–10.8)

## 2017-11-30 PROCEDURE — 84480 ASSAY TRIIODOTHYRONINE (T3): CPT

## 2017-11-30 PROCEDURE — 86038 ANTINUCLEAR ANTIBODIES: CPT

## 2017-11-30 PROCEDURE — 84439 ASSAY OF FREE THYROXINE: CPT

## 2017-11-30 PROCEDURE — 86200 CCP ANTIBODY: CPT

## 2017-11-30 PROCEDURE — 85025 COMPLETE CBC W/AUTO DIFF WBC: CPT

## 2017-11-30 PROCEDURE — 86376 MICROSOMAL ANTIBODY EACH: CPT

## 2017-11-30 PROCEDURE — 36415 COLL VENOUS BLD VENIPUNCTURE: CPT

## 2017-11-30 PROCEDURE — 80053 COMPREHEN METABOLIC PANEL: CPT

## 2017-11-30 PROCEDURE — 86431 RHEUMATOID FACTOR QUANT: CPT

## 2017-11-30 PROCEDURE — 84443 ASSAY THYROID STIM HORMONE: CPT

## 2017-11-30 PROCEDURE — 85652 RBC SED RATE AUTOMATED: CPT

## 2017-11-30 PROCEDURE — 86141 C-REACTIVE PROTEIN HS: CPT

## 2017-12-02 LAB — CCP IGG SERPL-ACNC: 188 UNITS (ref 0–19)

## 2017-12-03 LAB
NUCLEAR IGG SER QL IA: DETECTED
NUCLEAR IGG TITR SER IF: ABNORMAL {TITER}

## 2018-08-06 ENCOUNTER — HOSPITAL ENCOUNTER (EMERGENCY)
Facility: MEDICAL CENTER | Age: 43
End: 2018-08-06
Payer: MEDICAID

## 2018-08-06 VITALS
WEIGHT: 146.16 LBS | DIASTOLIC BLOOD PRESSURE: 83 MMHG | RESPIRATION RATE: 18 BRPM | TEMPERATURE: 98.8 F | SYSTOLIC BLOOD PRESSURE: 146 MMHG | HEART RATE: 122 BPM | HEIGHT: 70 IN | OXYGEN SATURATION: 98 % | BODY MASS INDEX: 20.93 KG/M2

## 2018-08-06 PROCEDURE — 302449 STATCHG TRIAGE ONLY (STATISTIC)

## 2018-08-06 RX ORDER — METHIMAZOLE 10 MG/1
10 TABLET ORAL 3 TIMES DAILY
Status: ON HOLD | COMMUNITY
End: 2018-12-02

## 2018-08-07 NOTE — ED NOTES
Name called in lobby, senior lounge, bathroom and outside. No response pt to be dismissed from [facility].

## 2018-08-07 NOTE — ED TRIAGE NOTES
Ambulates to triage  Chief Complaint   Patient presents with   • Epigastric Pain     comes and goes, increased pain with palpation, started today   • Pregnancy     14 weeks, denies any vag bleed or cramping     Pt describes her pain and a dull ache, but gets sharp at times, and occasionally her her back.

## 2018-10-09 ENCOUNTER — HOSPITAL ENCOUNTER (OUTPATIENT)
Facility: MEDICAL CENTER | Age: 43
End: 2018-10-09
Attending: OBSTETRICS & GYNECOLOGY | Admitting: OBSTETRICS & GYNECOLOGY
Payer: MEDICAID

## 2018-10-09 VITALS
DIASTOLIC BLOOD PRESSURE: 72 MMHG | HEART RATE: 100 BPM | BODY MASS INDEX: 22.9 KG/M2 | SYSTOLIC BLOOD PRESSURE: 133 MMHG | WEIGHT: 160 LBS | HEIGHT: 70 IN

## 2018-10-09 NOTE — PROGRESS NOTES
42yo, , edc/, 23.5 presents with c/o decreased fm. Pt denies LOF, vag bleeding. EFM and [employer] placed. VSS. Pt had not felt the baby move since last night. She was busy with housework and a new head cold and did not realize the lack of movement. Monitors placed and FHTs found right away to her great relief. No other c/o at this time.   1530 Dr. Castillo called and left message.   1600 No return call from Dr. Castillo. Called again.   1620 No return call from Dr. Castillo.   1630 Called the office. Spoke to MA. Will have Dr. Castillo return call.   1635 Dr. Castillo updated. Pt feeling fetal movement. Okay to discharge to home.   1640  Discharge instructions given, pt states understanding. Pt discharged to home  in stable condition, ambulatory.

## 2018-11-13 LAB — RUBV IGM SER IA-ACNC: NORMAL

## 2018-11-30 ENCOUNTER — HOSPITAL ENCOUNTER (EMERGENCY)
Facility: MEDICAL CENTER | Age: 43
End: 2018-11-30
Attending: EMERGENCY MEDICINE
Payer: MEDICAID

## 2018-11-30 ENCOUNTER — HOSPITAL ENCOUNTER (INPATIENT)
Facility: MEDICAL CENTER | Age: 43
LOS: 2 days | DRG: 831 | End: 2018-12-02
Attending: OBSTETRICS & GYNECOLOGY | Admitting: OBSTETRICS & GYNECOLOGY
Payer: MEDICAID

## 2018-11-30 VITALS
HEART RATE: 96 BPM | WEIGHT: 170 LBS | HEIGHT: 70 IN | SYSTOLIC BLOOD PRESSURE: 131 MMHG | RESPIRATION RATE: 16 BRPM | BODY MASS INDEX: 24.34 KG/M2 | OXYGEN SATURATION: 97 % | DIASTOLIC BLOOD PRESSURE: 69 MMHG | TEMPERATURE: 99.1 F

## 2018-11-30 DIAGNOSIS — M06.9 RHEUMATOID ARTHRITIS FLARE (HCC): ICD-10-CM

## 2018-11-30 LAB
ABO GROUP BLD: NORMAL
ABO GROUP BLD: NORMAL
ALBUMIN SERPL BCP-MCNC: 3.4 G/DL (ref 3.2–4.9)
ALBUMIN/GLOB SERPL: 0.9 G/DL
ALP SERPL-CCNC: 105 U/L (ref 30–99)
ALT SERPL-CCNC: 6 U/L (ref 2–50)
ANION GAP SERPL CALC-SCNC: 13 MMOL/L (ref 0–11.9)
APPEARANCE UR: CLEAR
AST SERPL-CCNC: 9 U/L (ref 12–45)
BASOPHILS # BLD AUTO: 0.1 % (ref 0–1.8)
BASOPHILS # BLD: 0.02 K/UL (ref 0–0.12)
BILIRUB SERPL-MCNC: 0.6 MG/DL (ref 0.1–1.5)
BILIRUB UR QL STRIP.AUTO: NEGATIVE
BLD GP AB SCN SERPL QL: NORMAL
BUN SERPL-MCNC: 6 MG/DL (ref 8–22)
CALCIUM SERPL-MCNC: 8.9 MG/DL (ref 8.5–10.5)
CHLORIDE SERPL-SCNC: 105 MMOL/L (ref 96–112)
CO2 SERPL-SCNC: 17 MMOL/L (ref 20–33)
COLOR UR: YELLOW
CREAT SERPL-MCNC: 0.38 MG/DL (ref 0.5–1.4)
EKG IMPRESSION: NORMAL
EOSINOPHIL # BLD AUTO: 0 K/UL (ref 0–0.51)
EOSINOPHIL NFR BLD: 0 % (ref 0–6.9)
ERYTHROCYTE [DISTWIDTH] IN BLOOD BY AUTOMATED COUNT: 43.6 FL (ref 35.9–50)
ERYTHROCYTE [SEDIMENTATION RATE] IN BLOOD BY WESTERGREN METHOD: 65 MM/HOUR (ref 0–20)
GLOBULIN SER CALC-MCNC: 4 G/DL (ref 1.9–3.5)
GLUCOSE SERPL-MCNC: 87 MG/DL (ref 65–99)
GLUCOSE UR STRIP.AUTO-MCNC: NEGATIVE MG/DL
HCT VFR BLD AUTO: 38.6 % (ref 37–47)
HGB BLD-MCNC: 12.4 G/DL (ref 12–16)
IMM GRANULOCYTES # BLD AUTO: 0.09 K/UL (ref 0–0.11)
IMM GRANULOCYTES NFR BLD AUTO: 0.5 % (ref 0–0.9)
KETONES UR STRIP.AUTO-MCNC: 40 MG/DL
LEUKOCYTE ESTERASE UR QL STRIP.AUTO: NEGATIVE
LYMPHOCYTES # BLD AUTO: 0.59 K/UL (ref 1–4.8)
LYMPHOCYTES NFR BLD: 3.6 % (ref 22–41)
MCH RBC QN AUTO: 23.4 PG (ref 27–33)
MCHC RBC AUTO-ENTMCNC: 32.1 G/DL (ref 33.6–35)
MCV RBC AUTO: 72.8 FL (ref 81.4–97.8)
MICRO URNS: ABNORMAL
MONOCYTES # BLD AUTO: 0.19 K/UL (ref 0–0.85)
MONOCYTES NFR BLD AUTO: 1.1 % (ref 0–13.4)
NEUTROPHILS # BLD AUTO: 15.7 K/UL (ref 2–7.15)
NEUTROPHILS NFR BLD: 94.7 % (ref 44–72)
NITRITE UR QL STRIP.AUTO: NEGATIVE
NRBC # BLD AUTO: 0 K/UL
NRBC BLD-RTO: 0 /100 WBC
PH UR STRIP.AUTO: 5.5 [PH]
PLATELET # BLD AUTO: 329 K/UL (ref 164–446)
PMV BLD AUTO: 11.2 FL (ref 9–12.9)
POTASSIUM SERPL-SCNC: 3.5 MMOL/L (ref 3.6–5.5)
PROT SERPL-MCNC: 7.4 G/DL (ref 6–8.2)
PROT UR QL STRIP: NEGATIVE MG/DL
RBC # BLD AUTO: 5.3 M/UL (ref 4.2–5.4)
RBC UR QL AUTO: NEGATIVE
RH BLD: NORMAL
RH BLD: NORMAL
SODIUM SERPL-SCNC: 135 MMOL/L (ref 135–145)
SP GR UR STRIP.AUTO: 1.01
T3FREE SERPL-MCNC: 5.14 PG/ML (ref 2.4–4.2)
T4 FREE SERPL-MCNC: 1.92 NG/DL (ref 0.53–1.43)
TSH SERPL DL<=0.005 MIU/L-ACNC: <0.005 UIU/ML (ref 0.38–5.33)
UROBILINOGEN UR STRIP.AUTO-MCNC: 0.2 MG/DL
WBC # BLD AUTO: 16.6 K/UL (ref 4.8–10.8)

## 2018-11-30 PROCEDURE — 84481 FREE ASSAY (FT-3): CPT

## 2018-11-30 PROCEDURE — 93005 ELECTROCARDIOGRAM TRACING: CPT | Performed by: OBSTETRICS & GYNECOLOGY

## 2018-11-30 PROCEDURE — A9270 NON-COVERED ITEM OR SERVICE: HCPCS | Performed by: OBSTETRICS & GYNECOLOGY

## 2018-11-30 PROCEDURE — 85652 RBC SED RATE AUTOMATED: CPT

## 2018-11-30 PROCEDURE — 81003 URINALYSIS AUTO W/O SCOPE: CPT

## 2018-11-30 PROCEDURE — 84443 ASSAY THYROID STIM HORMONE: CPT

## 2018-11-30 PROCEDURE — 86850 RBC ANTIBODY SCREEN: CPT

## 2018-11-30 PROCEDURE — 86900 BLOOD TYPING SEROLOGIC ABO: CPT

## 2018-11-30 PROCEDURE — 700111 HCHG RX REV CODE 636 W/ 250 OVERRIDE (IP): Performed by: EMERGENCY MEDICINE

## 2018-11-30 PROCEDURE — 99284 EMERGENCY DEPT VISIT MOD MDM: CPT

## 2018-11-30 PROCEDURE — 86901 BLOOD TYPING SEROLOGIC RH(D): CPT

## 2018-11-30 PROCEDURE — 700102 HCHG RX REV CODE 250 W/ 637 OVERRIDE(OP): Performed by: OBSTETRICS & GYNECOLOGY

## 2018-11-30 PROCEDURE — 93010 ELECTROCARDIOGRAM REPORT: CPT | Performed by: INTERNAL MEDICINE

## 2018-11-30 PROCEDURE — 700105 HCHG RX REV CODE 258: Performed by: OBSTETRICS & GYNECOLOGY

## 2018-11-30 PROCEDURE — 96374 THER/PROPH/DIAG INJ IV PUSH: CPT

## 2018-11-30 PROCEDURE — 770002 HCHG ROOM/CARE - OB PRIVATE (112)

## 2018-11-30 PROCEDURE — 96375 TX/PRO/DX INJ NEW DRUG ADDON: CPT

## 2018-11-30 PROCEDURE — 85025 COMPLETE CBC W/AUTO DIFF WBC: CPT

## 2018-11-30 PROCEDURE — 80053 COMPREHEN METABOLIC PANEL: CPT

## 2018-11-30 PROCEDURE — 36415 COLL VENOUS BLD VENIPUNCTURE: CPT

## 2018-11-30 PROCEDURE — 84439 ASSAY OF FREE THYROXINE: CPT

## 2018-11-30 RX ORDER — PROPRANOLOL HYDROCHLORIDE 20 MG/1
20 TABLET ORAL EVERY 6 HOURS
Status: DISCONTINUED | OUTPATIENT
Start: 2018-11-30 | End: 2018-12-02 | Stop reason: HOSPADM

## 2018-11-30 RX ORDER — PREDNISONE 20 MG/1
40 TABLET ORAL DAILY
Status: DISCONTINUED | OUTPATIENT
Start: 2018-12-01 | End: 2018-12-02 | Stop reason: HOSPADM

## 2018-11-30 RX ORDER — SODIUM CHLORIDE 9 MG/ML
INJECTION, SOLUTION INTRAVENOUS CONTINUOUS
Status: DISCONTINUED | OUTPATIENT
Start: 2018-11-30 | End: 2018-12-02 | Stop reason: HOSPADM

## 2018-11-30 RX ORDER — PREDNISONE 5 MG/1
5 TABLET ORAL DAILY
Status: DISCONTINUED | OUTPATIENT
Start: 2018-12-13 | End: 2018-12-02 | Stop reason: HOSPADM

## 2018-11-30 RX ORDER — CALCIUM CARBONATE 500 MG/1
500 TABLET, CHEWABLE ORAL DAILY
Status: DISCONTINUED | OUTPATIENT
Start: 2018-12-01 | End: 2018-12-02 | Stop reason: HOSPADM

## 2018-11-30 RX ORDER — ONDANSETRON 2 MG/ML
4 INJECTION INTRAMUSCULAR; INTRAVENOUS ONCE
Status: COMPLETED | OUTPATIENT
Start: 2018-11-30 | End: 2018-11-30

## 2018-11-30 RX ORDER — HYDROMORPHONE HYDROCHLORIDE 1 MG/ML
0.5 INJECTION, SOLUTION INTRAMUSCULAR; INTRAVENOUS; SUBCUTANEOUS ONCE
Status: COMPLETED | OUTPATIENT
Start: 2018-11-30 | End: 2018-11-30

## 2018-11-30 RX ORDER — ONDANSETRON 2 MG/ML
4 INJECTION INTRAMUSCULAR; INTRAVENOUS EVERY 4 HOURS PRN
Status: DISCONTINUED | OUTPATIENT
Start: 2018-11-30 | End: 2018-12-02 | Stop reason: HOSPADM

## 2018-11-30 RX ORDER — PREDNISONE 20 MG/1
10 TABLET ORAL DAILY
Status: DISCONTINUED | OUTPATIENT
Start: 2018-12-10 | End: 2018-12-02 | Stop reason: HOSPADM

## 2018-11-30 RX ORDER — PREDNISONE 20 MG/1
40 TABLET ORAL DAILY
Status: DISCONTINUED | OUTPATIENT
Start: 2018-12-01 | End: 2018-11-30

## 2018-11-30 RX ORDER — DIPHENHYDRAMINE HCL 25 MG
25 TABLET ORAL ONCE
Status: COMPLETED | OUTPATIENT
Start: 2018-11-30 | End: 2018-11-30

## 2018-11-30 RX ORDER — SODIUM CHLORIDE 9 MG/ML
500 INJECTION, SOLUTION INTRAVENOUS ONCE
Status: COMPLETED | OUTPATIENT
Start: 2018-11-30 | End: 2018-11-30

## 2018-11-30 RX ORDER — PREDNISONE 20 MG/1
30 TABLET ORAL DAILY
Status: DISCONTINUED | OUTPATIENT
Start: 2018-12-04 | End: 2018-12-02 | Stop reason: HOSPADM

## 2018-11-30 RX ORDER — PREDNISONE 20 MG/1
20 TABLET ORAL DAILY
Status: DISCONTINUED | OUTPATIENT
Start: 2018-12-07 | End: 2018-12-02 | Stop reason: HOSPADM

## 2018-11-30 RX ORDER — METHYLPREDNISOLONE SODIUM SUCCINATE 40 MG/ML
40 INJECTION, POWDER, LYOPHILIZED, FOR SOLUTION INTRAMUSCULAR; INTRAVENOUS ONCE
Status: COMPLETED | OUTPATIENT
Start: 2018-11-30 | End: 2018-11-30

## 2018-11-30 RX ADMIN — METHIMAZOLE 15 MG: 10 TABLET ORAL at 21:29

## 2018-11-30 RX ADMIN — SODIUM CHLORIDE 500 ML: 9 INJECTION, SOLUTION INTRAVENOUS at 11:00

## 2018-11-30 RX ADMIN — METHIMAZOLE 15 MG: 10 TABLET ORAL at 14:57

## 2018-11-30 RX ADMIN — PROPRANOLOL HYDROCHLORIDE 20 MG: 20 TABLET ORAL at 19:39

## 2018-11-30 RX ADMIN — SODIUM CHLORIDE 1000 ML: 9 INJECTION, SOLUTION INTRAVENOUS at 15:01

## 2018-11-30 RX ADMIN — DIPHENHYDRAMINE HCL 25 MG: 25 TABLET ORAL at 21:51

## 2018-11-30 RX ADMIN — HYDROMORPHONE HYDROCHLORIDE 0.5 MG: 1 INJECTION, SOLUTION INTRAMUSCULAR; INTRAVENOUS; SUBCUTANEOUS at 07:45

## 2018-11-30 RX ADMIN — METHYLPREDNISOLONE SODIUM SUCCINATE 40 MG: 40 INJECTION, POWDER, FOR SOLUTION INTRAMUSCULAR; INTRAVENOUS at 07:45

## 2018-11-30 RX ADMIN — SODIUM CHLORIDE: 9 INJECTION, SOLUTION INTRAVENOUS at 21:51

## 2018-11-30 RX ADMIN — PROPRANOLOL HYDROCHLORIDE 20 MG: 20 TABLET ORAL at 13:25

## 2018-11-30 RX ADMIN — ONDANSETRON 4 MG: 2 INJECTION INTRAMUSCULAR; INTRAVENOUS at 07:45

## 2018-11-30 ASSESSMENT — PATIENT HEALTH QUESTIONNAIRE - PHQ9
SUM OF ALL RESPONSES TO PHQ9 QUESTIONS 1 AND 2: 0
1. LITTLE INTEREST OR PLEASURE IN DOING THINGS: NOT AT ALL
2. FEELING DOWN, DEPRESSED, IRRITABLE, OR HOPELESS: NOT AT ALL

## 2018-11-30 ASSESSMENT — PAIN SCALES - GENERAL
PAINLEVEL_OUTOF10: 5
PAINLEVEL_OUTOF10: 8
PAINLEVEL_OUTOF10: 5
PAINLEVEL_OUTOF10: 3
PAINLEVEL_OUTOF10: 10
PAINLEVEL_OUTOF10: 4
PAINLEVEL_OUTOF10: ASSUMED PAIN PRESENT
PAINLEVEL_OUTOF10: 5

## 2018-11-30 ASSESSMENT — LIFESTYLE VARIABLES: DO YOU DRINK ALCOHOL: NO

## 2018-11-30 NOTE — ED PROVIDER NOTES
ED Provider Note    CHIEF COMPLAINT  Chief Complaint   Patient presents with   • Arthritis     flare up to hands, knees bilaterally,        HPI  Isabell Serrano is a 43 y.o. female who presents for arthritis pain.  The patient has a history of rheumatoid arthritis.  She is 7 months pregnant.  She has been on methylprednisolone 4 mg daily throughout her pregnancy.  She is now complaining of severe pain in her hands and wrists as well as her feet.  She also states that she is a high risk pregnancy with placenta previa.  She has had no vaginal bleeding.  She states she has started to have some contractions today.    REVIEW OF SYSTEMS  See HPI for further details. All other systems negative.    PAST MEDICAL HISTORY  Past Medical History:   Diagnosis Date   • Arthritis    • Arthritis, rheumatoid (HCC)    • RA (rheumatoid arthritis) (HCC) 11/5/2013       FAMILY HISTORY  Family History   Problem Relation Age of Onset   • Cancer Father         throat/prostate   • Rheumatologic Disease Paternal Aunt    • Other Paternal Aunt         MS   • Rheumatologic Disease Paternal Aunt    • Arthritis Paternal Aunt         RA       SOCIAL HISTORY  Social History     Social History   • Marital status: Single     Spouse name: N/A   • Number of children: N/A   • Years of education: N/A     Social History Main Topics   • Smoking status: Never Smoker   • Smokeless tobacco: Never Used   • Alcohol use 0.5 oz/week     1 Standard drinks or equivalent per week      Comment: Once a month   • Drug use: No   • Sexual activity: Yes     Partners: Male     Other Topics Concern   • Not on file     Social History Narrative   • No narrative on file       SURGICAL HISTORY  Past Surgical History:   Procedure Laterality Date   • MAMMOPLASTY AUGMENTATION  7/12/2011    Performed by MARIBELL GONZALES at [facility]       CURRENT MEDICATIONS  Home Medications     Reviewed by Kathrin Segovia R.N. (Registered Nurse) on 11/30/18 at 0718  Med  "List Status: Not Addressed   Medication Last Dose Status   methimazole (TAPAZOLE) 10 MG Tab 11/29/2018 Active   methylPREDNISolone (MEDROL) 4 MG Tab 11/29/2018 Active                ALLERGIES  No Known Allergies    PHYSICAL EXAM  VITAL SIGNS: /69   Pulse (!) 110   Temp 37.2 °C (99 °F) (Temporal)   Resp 16   Ht 1.778 m (5' 10\")   Wt 77.1 kg (170 lb)   SpO2 95%   BMI 24.39 kg/m²   Constitutional: Well developed, Well nourished, Non-toxic appearance.   HENT: Normocephalic, Atraumatic.  Eyes:  EOMI, Conjunctiva normal, No discharge.   Cardiovascular: Tachycardic.   Thorax & Lungs: No respiratory distress.  Skin: Warm, Dry.  Musculoskeletal: Swelling and deformities to both hands consistent with rheumatoid arthritis.  Severe discomfort with any range of motion.  Neurologic: Awake and alert.    COURSE & MEDICAL DECISION MAKING  Pertinent Labs & Imaging studies reviewed. (See chart for details)  This is a 43-year-old here for evaluation of arthritis pain.  The patient is 7 months pregnant.  She has a history of rheumatoid arthritis and has been on methylprednisolone 4 mg daily throughout this pregnancy.  She is now having worsening rheumatoid symptoms.  She is treated with 40 mg of Solu-Medrol IV here in the emergency department as well as a half a milligram of Dilaudid and 4 mg of Zofran.  This results in excellent improvement in her symptoms.  Due to the fact that she is a high risk pregnancy at 7 months and has been having some contractions I contacted labor and delivery.  I spoke with the charge nurse and informed her that I was going to discharge the patient from the emergency department and have her taken up to L&D for monitoring.  They were expecting the patient on L&D.  I discussed with the patient and her mother that there may be unknown risks with using steroids in pregnancy.  At this point the patient has been on steroids throughout her pregnancy and I think giving her a one-time dose of Solu-Medrol " may improve her symptoms and not change her current risks.    FINAL IMPRESSION  1.  Rheumatoid arthritis flare  2.   3.         Electronically signed by: Lee Amaya, 11/30/2018 7:34 AM

## 2018-11-30 NOTE — ED NOTES
Pt medicated as ordered by provider. ERP at bedside. Pt given ice chips. Trial of icechips and plan is to transfer to L&D

## 2018-11-30 NOTE — H&P
HISTORY OF PRESENT ILLNESS:  Patient is a 43-year-old female  3, para 2   at 31-1/7 weeks' gestation, private patient of Dr. Leopoldo Wiseman, who has   had a complicated pregnancy with the fact that she does have rheumatoid   arthritis.  She has been on 4 mg of prednisone daily and followed by her   primary care physician.  Additionally, she has hypothyroidism and is on   methimazole 10 mg t.i.d.  Her last thyroid labs were done at 28 weeks   gestation.  Additionally, she has been followed for placenta previa and Dr. Hunt is following her as well.  Her last ultrasound was done at 28 weeks and   she has an ultrasound scheduled for later this week to see if a partial previa   had resolved yet.    Patient arrived to the emergency room complaining of a rheumatoid flare today.    She reports diarrhea, nausea, vomiting, shaking hands are increasing with   pain and swelling.  In the emergency room, her pulse was 110, blood pressure   131/69.  She was seen by the ER physician and was presumed to have a thyroid   flare, so she was given some Dilaudid, Zofran, and methylprednisolone 40 mg   and once the patient started saying she was having contractions, they sent her   up to labor and delivery; however, they did not draw any labs on her at all.    Upon my arrival her hands were shaking and she reports she had n/v and diarrhea  Earlier this am and with her history of hypothyroid, I would presume she is in thyroid   storm.  Additionally, she may be having a rheumatoid flare.  She is also   prudencio frequently.  She is feeling some of them, but not in active labor.    She reports no leaking fluid, no vaginal bleeding, and reports good fetal   movement.    PAST MEDICAL HISTORY:  Again, as stated in HPI, hypothyroid and rheumatoid   arthritis.    PAST SURGICAL HISTORY:  Bilateral breast augmentation.    OBSTETRICAL HISTORY:  G3, P2, she had 2 spontaneous vaginal deliveries,   uncomplicated.    GYNECOLOGIC HISTORY:   Noncontributory.  Denies any STDs or abnormal paps.    SOCIAL HISTORY:  Denies alcohol, tobacco or history of drug use.  She is here   with her mother today.    CURRENT MEDICATIONS:  Include prenatal vitamins, methimazole 10 mg t.i.d., and   prednisone 4 mg tablets daily.    ALLERGIES:  None.    PHYSICAL EXAMINATION:  VITAL SIGNS:  131/69 was her blood pressure down stairs.  Her post-down stairs   was 110; however, at the bedside right now she is ranging 70s to 85 beats per   minute.  She is afebrile.  GENERAL:  She is awake, alert, orientated.  She is shaking at the bedside   holding her hand as they do ache and hurts, but otherwise in no significant   acute distress.  CARDIOVASCULAR:  Regular rate and rhythm.  CHEST:  Clear to auscultation bilaterally.  ABDOMEN:  Gravid, nontender, nondistended.  Normal bowel sounds.  EXTREMITIES:  No cyanosis, clubbing.  Lower extremities, no edema; however,   she has got significant edema in her fingers and hands and pain with even   moving them.  PELVIC:  Vaginal exam has been deferred secondary to history of a previa.    Fetal heart rate tracing category 1, reactive, reassuring, good variability,   no decelerations.  She is prudencio frequently, but not actively laboring.    LABORATORY DATA:  Again, no labs were drawn in the ER.    ASSESSMENT AND PLAN:  A 43-year-old  3, para 2 at 31 and 1/7 weeks   gestation.  1.  Suspected thyroid storm secondary to her diarrhea, nausea, vomiting and   shaking hands.  We will draw labs now, start IV and push up 500 mL bolus of   normal saline and then will run 125 mL an hour.  Additionally, she may also be   in the rheumatoid flares.  She was given Solu-Medrol downstairs in the ER for   that.  3.  Marginal previa, last ultrasound was several weeks ago, she was due to   have a repeat this week and will need to still ruled that out today.  4.   contractions; however, if she is in thyroid storm, I am going to   hold off on using  any medications for the contractions at this point in time   until we have labs back in and make sure we are treating up appropriately, but   again we will need to watch for signs or symptoms of labor or bleeding   secondary to her marginal previa.  5.  At this point, we will also draw labs CBC, CMP, sed rate, TSH, free T3 and   free T4, urinalysis.  I have contacted and been in touch with Dr. Hunt who has   also been following her and we will await labs to further evaluate and treat   at this point.       ____________________________________     MD ROSIBEL ARNOLD / NTS    DD:  11/30/2018 10:57:45  DT:  11/30/2018 11:45:53    D#:  [id number]  Job#:  [id number]

## 2018-11-30 NOTE — ED NOTES
"Night shift RN checking in patient. This RN at bedside to introduce self to patient. Pt is 7 months pregnant at this time. While this RN at bedside, pt states, \"I'm having a contraction\". Pt states she is only having one contraction at this time. Pt mother at bedside states, \"It's a problem when its more than 4 in an hour, shes high risk\". This RN notified charge RN and MD. Plan is to keep patient in blue 14 at this time. Continue to monitor. Pt is educated by this RN to notify this RN immediately if she has another contraction. Pt verbalized understanding. Call bell in reach.   "

## 2018-11-30 NOTE — ED TRIAGE NOTES
"Chief Complaint   Patient presents with   • Arthritis     flare up to hands, knees bilaterally,      Blood pressure 131/69, pulse (!) 110, temperature 37.2 °C (99 °F), temperature source Temporal, resp. rate 16, height 1.778 m (5' 10\"), weight 77.1 kg (170 lb), SpO2 95 %, not currently breastfeeding.    Pt bib EMS c/o severe pain to wrists, hands bilaterally since last night. Pt has arthritis, takes medication regularly, is on a lower dose since the beginning of pregnancy, pt states she has never had pain like this before. Pt's hands are swollen, slightly deformed, diminished movement. Pt received 150 mcg Fent. 4 mg Zofran pta. Pt is 7 mo pregnant, has placenta previa, pt has been experiencing contractions since the beginning of Nov,  She is being monitored regularly by her OB/GYN.  "

## 2018-11-30 NOTE — ED NOTES
Pt discharge ordered by provider. Pt discharge instructions reviewed with patient by this RN.  Pt verbalized understanding of discharge information. Pt discharged alert, oriented, and via wheelchair to labor and delivery to be evaluated.

## 2018-11-30 NOTE — PROGRESS NOTES
"0840-pt presents from ER via w/c, was in ER for RA flair up and then she stated that she was having uc's and so was then transferred to [facility], no report received, pt states that she is having \"gudelia stein\" uc's and it is normal for her, states that she is having more than her normal at this time, also states that she has a previa, no c/o leaking or bleeding at this time, placed on external monitors, did not take vs at this time as they have recently been taken and pt is in pain, will watch for uc's  0950-TC Dr Mckeon, report given, will be in to see pt  0955-Dr Mckeon here, assessment done, pt also has hyperthyroidism and Dr Mckeon is suspecting pt to be in thyroid storm, will discuss with Dr Hunt  1030-labs ordered from Dr Mckeon, admission to antepartum order received  1100-iv started, labs drawn, report given to JOANN Pozo RN, POC discussed, transferred to antepartum    "

## 2018-12-01 ENCOUNTER — APPOINTMENT (OUTPATIENT)
Dept: RADIOLOGY | Facility: MEDICAL CENTER | Age: 43
DRG: 831 | End: 2018-12-01
Attending: OBSTETRICS & GYNECOLOGY
Payer: MEDICAID

## 2018-12-01 LAB
ALBUMIN SERPL BCP-MCNC: 2.6 G/DL (ref 3.2–4.9)
ALBUMIN/GLOB SERPL: 0.9 G/DL
ALP SERPL-CCNC: 78 U/L (ref 30–99)
ALT SERPL-CCNC: 5 U/L (ref 2–50)
ANION GAP SERPL CALC-SCNC: 7 MMOL/L (ref 0–11.9)
AST SERPL-CCNC: 7 U/L (ref 12–45)
BILIRUB SERPL-MCNC: 0.5 MG/DL (ref 0.1–1.5)
BUN SERPL-MCNC: 5 MG/DL (ref 8–22)
CALCIUM SERPL-MCNC: 8.2 MG/DL (ref 8.5–10.5)
CHLORIDE SERPL-SCNC: 112 MMOL/L (ref 96–112)
CO2 SERPL-SCNC: 19 MMOL/L (ref 20–33)
CREAT SERPL-MCNC: 0.33 MG/DL (ref 0.5–1.4)
GLOBULIN SER CALC-MCNC: 3 G/DL (ref 1.9–3.5)
GLUCOSE SERPL-MCNC: 97 MG/DL (ref 65–99)
POTASSIUM SERPL-SCNC: 3.2 MMOL/L (ref 3.6–5.5)
PROT SERPL-MCNC: 5.6 G/DL (ref 6–8.2)
SODIUM SERPL-SCNC: 138 MMOL/L (ref 135–145)

## 2018-12-01 PROCEDURE — 700105 HCHG RX REV CODE 258: Performed by: OBSTETRICS & GYNECOLOGY

## 2018-12-01 PROCEDURE — 76819 FETAL BIOPHYS PROFIL W/O NST: CPT

## 2018-12-01 PROCEDURE — 36415 COLL VENOUS BLD VENIPUNCTURE: CPT

## 2018-12-01 PROCEDURE — 302790 HCHG STAT ANTEPARTUM CARE, DAILY

## 2018-12-01 PROCEDURE — 700102 HCHG RX REV CODE 250 W/ 637 OVERRIDE(OP): Performed by: OBSTETRICS & GYNECOLOGY

## 2018-12-01 PROCEDURE — 59025 FETAL NON-STRESS TEST: CPT

## 2018-12-01 PROCEDURE — 80053 COMPREHEN METABOLIC PANEL: CPT

## 2018-12-01 PROCEDURE — 770002 HCHG ROOM/CARE - OB PRIVATE (112)

## 2018-12-01 PROCEDURE — A9270 NON-COVERED ITEM OR SERVICE: HCPCS | Performed by: OBSTETRICS & GYNECOLOGY

## 2018-12-01 PROCEDURE — 700111 HCHG RX REV CODE 636 W/ 250 OVERRIDE (IP): Performed by: OBSTETRICS & GYNECOLOGY

## 2018-12-01 RX ORDER — DEXTROSE AND SODIUM CHLORIDE 5; .9 G/100ML; G/100ML
INJECTION, SOLUTION INTRAVENOUS CONTINUOUS
Status: DISCONTINUED | OUTPATIENT
Start: 2018-12-01 | End: 2018-12-01

## 2018-12-01 RX ORDER — ACETAMINOPHEN 325 MG/1
650 TABLET ORAL EVERY 4 HOURS PRN
Status: DISCONTINUED | OUTPATIENT
Start: 2018-12-01 | End: 2018-12-02 | Stop reason: HOSPADM

## 2018-12-01 RX ORDER — POTASSIUM CHLORIDE 20 MEQ/1
20 TABLET, EXTENDED RELEASE ORAL 2 TIMES DAILY
Status: DISCONTINUED | OUTPATIENT
Start: 2018-12-01 | End: 2018-12-02 | Stop reason: HOSPADM

## 2018-12-01 RX ORDER — DEXTROSE AND SODIUM CHLORIDE 5; .9 G/100ML; G/100ML
INJECTION, SOLUTION INTRAVENOUS CONTINUOUS
Status: DISCONTINUED | OUTPATIENT
Start: 2018-12-01 | End: 2018-12-02 | Stop reason: HOSPADM

## 2018-12-01 RX ADMIN — POTASSIUM CHLORIDE 20 MEQ: 1500 TABLET, EXTENDED RELEASE ORAL at 09:30

## 2018-12-01 RX ADMIN — METHIMAZOLE 15 MG: 10 TABLET ORAL at 08:49

## 2018-12-01 RX ADMIN — PROPRANOLOL HYDROCHLORIDE 20 MG: 20 TABLET ORAL at 02:10

## 2018-12-01 RX ADMIN — METHIMAZOLE 15 MG: 10 TABLET ORAL at 15:06

## 2018-12-01 RX ADMIN — PREDNISONE 40 MG: 20 TABLET ORAL at 06:06

## 2018-12-01 RX ADMIN — METHIMAZOLE 15 MG: 10 TABLET ORAL at 03:35

## 2018-12-01 RX ADMIN — DEXTROSE AND SODIUM CHLORIDE: 5; 900 INJECTION, SOLUTION INTRAVENOUS at 02:59

## 2018-12-01 RX ADMIN — PROPRANOLOL HYDROCHLORIDE 20 MG: 20 TABLET ORAL at 20:01

## 2018-12-01 RX ADMIN — PROPRANOLOL HYDROCHLORIDE 20 MG: 20 TABLET ORAL at 08:01

## 2018-12-01 RX ADMIN — POTASSIUM CHLORIDE 20 MEQ: 1500 TABLET, EXTENDED RELEASE ORAL at 18:17

## 2018-12-01 RX ADMIN — METHIMAZOLE 15 MG: 10 TABLET ORAL at 21:22

## 2018-12-01 RX ADMIN — PROPRANOLOL HYDROCHLORIDE 20 MG: 20 TABLET ORAL at 14:23

## 2018-12-01 RX ADMIN — ACETAMINOPHEN 650 MG: 325 TABLET, FILM COATED ORAL at 03:56

## 2018-12-01 RX ADMIN — DEXTROSE AND SODIUM CHLORIDE: 5; 900 INJECTION, SOLUTION INTRAVENOUS at 12:21

## 2018-12-01 ASSESSMENT — PAIN SCALES - GENERAL
PAINLEVEL_OUTOF10: 0
PAINLEVEL_OUTOF10: 2
PAINLEVEL_OUTOF10: 0
PAINLEVEL_OUTOF10: 5

## 2018-12-01 ASSESSMENT — PATIENT HEALTH QUESTIONNAIRE - PHQ9
2. FEELING DOWN, DEPRESSED, IRRITABLE, OR HOPELESS: NOT AT ALL
SUM OF ALL RESPONSES TO PHQ9 QUESTIONS 1 AND 2: 0
2. FEELING DOWN, DEPRESSED, IRRITABLE, OR HOPELESS: NOT AT ALL
SUM OF ALL RESPONSES TO PHQ9 QUESTIONS 1 AND 2: 0
1. LITTLE INTEREST OR PLEASURE IN DOING THINGS: NOT AT ALL
1. LITTLE INTEREST OR PLEASURE IN DOING THINGS: NOT AT ALL

## 2018-12-01 NOTE — PROGRESS NOTES
1900 Report received from Kathy AMIN. POC discussed. PT denies any needs at this time.   2045 Dr. Hunt at bedside. POC discussed. No new orders received.   2350 Dr. Hunt phoned, orders received to change medication frequency and switch pt to D5NS, see MAR.   0003 Wm at Pharmacy notified.   0105 Bobbi at central supply phoned regarded D5NS, Bobbi to tube d5NS to L&D  0130 Dr. Mckeon phoned regarding nonreactive NST. Orders received for BPP  0210 Bobbi at central supply phoned regarding D5NS. [facility] has no order for D5NS. Leola req sent to central supply for D5NS  0315 US at bedside  0350 Dr. Mckeon phoned report given on BPP 6/8 and pt headache. Orders received for continuous monitoring and tylenol for headache. PT updated with POC and verbalizes agreement.  0700 Report given to William AMIN. POC discussed.

## 2018-12-01 NOTE — PROGRESS NOTES
L&D Progress Note    Name:   Isabell Serrano   Date/Time:  12/1/2018 8:06 AM  Gestational Age:  31w2d  Admit Date:   11/30/2018  Admitting Dx:   Pregnancy  Labor and delivery indication for care or intervention    Subjective:  Did well last night. Got vistiril.  Rn then tried to do her nst after pt had gotten medication and nst was non reactive. bpp was 6/8 and fetal tracing has remained reassuring since  Pt still sleeping soundly. denies cxtxns, no bleeding.  She reported last night her hands felt a little better.  She did stop shaking.  Pulse was normal on propanolol.     Objective:   Vitals:    12/01/18 0415 12/01/18 0758 12/01/18 0759 12/01/18 0801   BP: 103/56  113/62 113/62   Pulse: 88 95 88 90   Resp: 17      Temp: 36.2 °C (97.2 °F)      TempSrc: Temporal      SpO2:  97%     Weight:       Height:         Gen: sleeping  Abd: gravid nt  Ext: hands/feet with mulitple deformities due to her RA  SVE: deferred  Nst; cat 1, mod variability no decels  toco occasional    Labs:  Recent Results (from the past 72 hour(s))   CBC WITH DIFFERENTIAL    Collection Time: 11/30/18 11:00 AM   Result Value Ref Range    WBC 16.6 (H) 4.8 - 10.8 K/uL    RBC 5.30 4.20 - 5.40 M/uL    Hemoglobin 12.4 12.0 - 16.0 g/dL    Hematocrit 38.6 37.0 - 47.0 %    MCV 72.8 (L) 81.4 - 97.8 fL    MCH 23.4 (L) 27.0 - 33.0 pg    MCHC 32.1 (L) 33.6 - 35.0 g/dL    RDW 43.6 35.9 - 50.0 fL    Platelet Count 329 164 - 446 K/uL    MPV 11.2 9.0 - 12.9 fL    Neutrophils-Polys 94.70 (H) 44.00 - 72.00 %    Lymphocytes 3.60 (L) 22.00 - 41.00 %    Monocytes 1.10 0.00 - 13.40 %    Eosinophils 0.00 0.00 - 6.90 %    Basophils 0.10 0.00 - 1.80 %    Immature Granulocytes 0.50 0.00 - 0.90 %    Nucleated RBC 0.00 /100 WBC    Neutrophils (Absolute) 15.70 (H) 2.00 - 7.15 K/uL    Lymphs (Absolute) 0.59 (L) 1.00 - 4.80 K/uL    Monos (Absolute) 0.19 0.00 - 0.85 K/uL    Eos (Absolute) 0.00 0.00 - 0.51 K/uL    Baso (Absolute) 0.02 0.00 - 0.12 K/uL    Immature  Granulocytes (abs) 0.09 0.00 - 0.11 K/uL    NRBC (Absolute) 0.00 K/uL   COMP METABOLIC PANEL    Collection Time: 18 11:00 AM   Result Value Ref Range    Sodium 135 135 - 145 mmol/L    Potassium 3.5 (L) 3.6 - 5.5 mmol/L    Chloride 105 96 - 112 mmol/L    Co2 17 (L) 20 - 33 mmol/L    Anion Gap 13.0 (H) 0.0 - 11.9    Glucose 87 65 - 99 mg/dL    Bun 6 (L) 8 - 22 mg/dL    Creatinine 0.38 (L) 0.50 - 1.40 mg/dL    Calcium 8.9 8.5 - 10.5 mg/dL    AST(SGOT) 9 (L) 12 - 45 U/L    ALT(SGPT) 6 2 - 50 U/L    Alkaline Phosphatase 105 (H) 30 - 99 U/L    Total Bilirubin 0.6 0.1 - 1.5 mg/dL    Albumin 3.4 3.2 - 4.9 g/dL    Total Protein 7.4 6.0 - 8.2 g/dL    Globulin 4.0 (H) 1.9 - 3.5 g/dL    A-G Ratio 0.9 g/dL   TSH    Collection Time: 18 11:00 AM   Result Value Ref Range    TSH <0.005 (L) 0.380 - 5.330 uIU/mL   FREE THYROXINE    Collection Time: 18 11:00 AM   Result Value Ref Range    Free T-4 1.92 (H) 0.53 - 1.43 ng/dL   WESTERGREN SED RATE    Collection Time: 18 11:00 AM   Result Value Ref Range    Sed Rate Westergren 65 (H) 0 - 20 mm/hour   T3 FREE    Collection Time: 18 11:00 AM   Result Value Ref Range    T3,Free 5.14 (H) 2.40 - 4.20 pg/mL   COD - Adult (Type and Screen)    Collection Time: 18 11:00 AM   Result Value Ref Range    ABO Grouping Only B     Rh Grouping Only POS     Antibody Screen-Cod NEG    ESTIMATED GFR    Collection Time: 18 11:00 AM   Result Value Ref Range    GFR If African American >60 >60 mL/min/1.73 m 2    GFR If Non African American >60 >60 mL/min/1.73 m 2   EKG    Collection Time: 18  1:03 PM   Result Value Ref Range    Report       [facility]    Test Date:  2018  Pt Name:    MICH GRAHAM                 Department: 22  MRN:        [id number]                      Room:       [facility]  Gender:     Female                       Technician: NAVEED  :        [id number]                   Requested By:OTTO JEFFREY  Order #:    [id number]                     Reading MD: John Amaya MD    Measurements  Intervals                                Axis  Rate:       109                          P:          48  PA:         188                          QRS:        45  QRSD:       80                           T:          52  QT:         336  QTc:        453    Interpretive Statements  SINUS TACHYCARDIA  No previous ECG available for comparison    Electronically Signed On 11- 14:01:14 PST by John Amaya MD     ABO AND RH CONFIRMATION    Collection Time: 11/30/18  1:57 PM   Result Value Ref Range    ABO Confirm B     Second Rh Group POS    URINALYSIS    Collection Time: 11/30/18  3:30 PM   Result Value Ref Range    Color Yellow     Character Clear     Specific Gravity 1.008 <1.035    Ph 5.5 5.0 - 8.0    Glucose Negative Negative mg/dL    Ketones 40 (A) Negative mg/dL    Protein Negative Negative mg/dL    Bilirubin Negative Negative    Urobilinogen, Urine 0.2 Negative    Nitrite Negative Negative    Leukocyte Esterase Negative Negative    Occult Blood Negative Negative    Micro Urine Req see below    COMP METABOLIC PANEL    Collection Time: 12/01/18  2:56 AM   Result Value Ref Range    Sodium 138 135 - 145 mmol/L    Potassium 3.2 (L) 3.6 - 5.5 mmol/L    Chloride 112 96 - 112 mmol/L    Co2 19 (L) 20 - 33 mmol/L    Anion Gap 7.0 0.0 - 11.9    Glucose 97 65 - 99 mg/dL    Bun 5 (L) 8 - 22 mg/dL    Creatinine 0.33 (L) 0.50 - 1.40 mg/dL    Calcium 8.2 (L) 8.5 - 10.5 mg/dL    AST(SGOT) 7 (L) 12 - 45 U/L    ALT(SGPT) 5 2 - 50 U/L    Alkaline Phosphatase 78 30 - 99 U/L    Total Bilirubin 0.5 0.1 - 1.5 mg/dL    Albumin 2.6 (L) 3.2 - 4.9 g/dL    Total Protein 5.6 (L) 6.0 - 8.2 g/dL    Globulin 3.0 1.9 - 3.5 g/dL    A-G Ratio 0.9 g/dL   ESTIMATED GFR    Collection Time: 12/01/18  2:56 AM   Result Value Ref Range    GFR If African American >60 >60 mL/min/1.73 m 2    GFR If Non African American >60 >60 mL/min/1.73 m 2         Assessment:   Gestational Age:   31w2d  Rheumatoid flare  - iv solumedrol yesterday and now will start steroid taper today.  Hyperthryoid/storm - placed on propanolol and her Methimazole was doubled.    contractions - quieted down after hydration  Marginal previa - Dr Hunt consulting and will rescan  Advanced maternal age  Fetus overall reasurring - will take off continuous monitoring  Hypo kalemia - needs K replacment    Kiarra Mckeon M.D.

## 2018-12-01 NOTE — PROGRESS NOTES
"1100 - Report received from Mirna BACON RN, care assumed. Ambriz Gestation - 31.1 weeks    Patient in triage currently having IV/S.L. Placed. Patients mother at . Once S.L. In place, patient taken to antepartum room 234 by steff. Reports little sleep last night because of discomfort caused by her RA flare up. Reports  Contractions, denies discomfort from contractions. Reports frequent FM, no ROM no Bleeding, denies dizziness or weakness. At this time patient denies nausea/vomiting/diarrhea, little shaking - not visible to RN, afebrile, states she feels ok except for her joints.     FM/Chelan use discussed and in place, discussed POC, pleasant affect/mood, denies questions/concerns regarding care since arrival to [facility]. Patient encouraged to call RN with all questions/concerns/needs. The dry erase board updated with RN/CNA contact information, reviewed.      1300 - Patient denies n/v/d, denies feeling shaky, remains afebrile. RN and or CNA have been assisting patient up to the . Patient uses her hands very little due to discomfort. No family at the BS.     1315 - Lab results reported to Dr. Hunt, orders received. POC discussed with patient.    1700 - FOB \"Jose\" at  assisting patient.     1800 - Dr. Mckeon at  assessing current status.    1900 - Report to Angela SCHRADER RN  "

## 2018-12-01 NOTE — CONSULTS
DATE OF SERVICE:  2018    REASON FOR CONSULTATION:  Hyperthyroidism in pregnancy.    REQUESTING PHYSICIAN:  Dr. Mckeon.      HISTORY OF PRESENT ILLNESS:  This is a 43-year-old , referred for   consultation, previously known to me as for advanced maternal age and   hyperthyroidism, with rheumatoid arthritis.  The patient reports she was   admitted today with complaints of sudden onset of joint pain consistent with   rheumatoid arthritis and when she was evaluated, she received steroid therapy   in the emergency department.  It became evident; however, that she may be in   thyroid storm.  The patient reports that she is feeling slightly agitated, but   otherwise was feeling reasonably well.    Patient was seen by Dr. Mckeon and initial studies indicated the patient to be   tachycardic with a pulse of 100, blood pressure 133/72.  Fetal heart rate   tracing was moderate variability, baseline 140 beats per minute.  Patient was   afebrile.  Initial laboratory studies obtained by Dr. Mckeon revealed the   patient to be ketonuric with urine specific gravity of 1.008.  Patient's blood   type is B positive.  TSH was not measurable.  Free T4 elevated at 1.92, free   T3 was elevated at 5.14.  I was contacted by Dr. Mckeon and recommended   admission.  Also noted her sed rate was elevated at 65.  Electrolytes were   within normal limits and her hematocrit was 38.6% and  hemoglobin 12.4.    Patient reports that she has been taking methimazole 10 mg 3 times a day and   has been faithful to this regimen.  She now reports her pain is significantly   better.    PAST MEDICAL HISTORY:  Hyperthyroidism diagnosed in 2016.  At this time I saw   her, she had been off her medications from 2018 and was restarted on   methimazole 10 mg b.i.d.  For her rheumatoid arthritis, she is currently on   methylprednisolone 4 mg daily.  She denies any history of asthma, seizures,   prior hypertension, diabetes, cardiovascular, respiratory, GI,  , or   endocrine disorders.    OPERATIONS:  Include breast augmentation.    PAST OBSTETRICAL HISTORY:  In , term female, 7 pounds 1 ounce, , no   complications.  In , term female, 7 pounds 0 ounces, , no   complications.    TRANSFUSIONS:  None.    ALLERGIES:  None.    HABITS:  None.    MEDICATIONS:  As previously stated.    VENEREAL DISEASE HISTORY:  Negative.    PHYSICAL EXAMINATION:  GENERAL:  Well-developed, well-nourished, somewhat agitated appearing female.  HEAD:  Normocephalic.  EYES:  Exophthalmos noted, slight lid lag.  EAR, NOSE AND THROAT:  Grossly within normal limits.  LUNGS:  Clear.  HEART:  Tachycardic, regular rhythm.  Normal S1 and S2.  Grade 1-2/6 systolic   ejection murmur, heard best at the left sternal border.  ABDOMEN:  Soft, gravid uterus.  EXTREMITIES:  No edema or varicosities.  However, examination of the hands   demonstrate inflammation and her knees also appeared to be somewhat swollen.  NEUROLOGIC:  Cranial nerves II-XII are grossly intact.  Deep tendon reflexes   are 0 to 1.    ASSESSMENT:  1.  Intrauterine pregnancy at 31 weeks and 1 day.  2.  Hyperthyroid state, probable early stages thus far.  3.  Rheumatoid arthritis, re-exacerbation.    PLAN:  We will initiate propranolol to slower her pulse rate.  Since she is on   methimazole will increase the dose to 50 mg every 8 hours.  We will add IV   hydration with normal saline for now and monitor patient's vitals.  If she   should improve significantly, we will just initiate expectant management.    Patient has been counseled on rationale of therapy.  She may need PTU to her   current therapy to initiate improvement in her assessment.  All questions   answered to her satisfaction.    Time:   120 minutes       ____________________________________     MD NICOLASA MANUEL / MARILEE    DD:  2018 23:52:47  DT:  2018 01:35:59    D#:  [id number]  Job#:  [id number]

## 2018-12-01 NOTE — PROGRESS NOTES
0700. Report from Angela AMIN. POC discussed and resumed at the bedside. Pt asleep at this time.    1200. Pt has no needs at this tome.    1600. In room, pt resting, no needs at this time.    1900. Report to Inez AMIN. POC discussed.

## 2018-12-02 VITALS
HEIGHT: 70 IN | TEMPERATURE: 97.3 F | HEART RATE: 83 BPM | BODY MASS INDEX: 24.34 KG/M2 | RESPIRATION RATE: 16 BRPM | SYSTOLIC BLOOD PRESSURE: 126 MMHG | WEIGHT: 170 LBS | DIASTOLIC BLOOD PRESSURE: 66 MMHG | OXYGEN SATURATION: 96 %

## 2018-12-02 LAB
ALBUMIN SERPL BCP-MCNC: 2.5 G/DL (ref 3.2–4.9)
ALBUMIN/GLOB SERPL: 0.9 G/DL
ALP SERPL-CCNC: 72 U/L (ref 30–99)
ALT SERPL-CCNC: 5 U/L (ref 2–50)
ANION GAP SERPL CALC-SCNC: 7 MMOL/L (ref 0–11.9)
AST SERPL-CCNC: 7 U/L (ref 12–45)
BILIRUB SERPL-MCNC: 0.3 MG/DL (ref 0.1–1.5)
BUN SERPL-MCNC: 5 MG/DL (ref 8–22)
CALCIUM SERPL-MCNC: 8.4 MG/DL (ref 8.5–10.5)
CHLORIDE SERPL-SCNC: 113 MMOL/L (ref 96–112)
CO2 SERPL-SCNC: 19 MMOL/L (ref 20–33)
CREAT SERPL-MCNC: 0.37 MG/DL (ref 0.5–1.4)
GLOBULIN SER CALC-MCNC: 2.9 G/DL (ref 1.9–3.5)
GLUCOSE SERPL-MCNC: 84 MG/DL (ref 65–99)
POTASSIUM SERPL-SCNC: 3.3 MMOL/L (ref 3.6–5.5)
PROT SERPL-MCNC: 5.4 G/DL (ref 6–8.2)
SODIUM SERPL-SCNC: 139 MMOL/L (ref 135–145)

## 2018-12-02 PROCEDURE — 700102 HCHG RX REV CODE 250 W/ 637 OVERRIDE(OP): Performed by: OBSTETRICS & GYNECOLOGY

## 2018-12-02 PROCEDURE — A9270 NON-COVERED ITEM OR SERVICE: HCPCS | Performed by: OBSTETRICS & GYNECOLOGY

## 2018-12-02 PROCEDURE — 36415 COLL VENOUS BLD VENIPUNCTURE: CPT

## 2018-12-02 PROCEDURE — 700111 HCHG RX REV CODE 636 W/ 250 OVERRIDE (IP): Performed by: OBSTETRICS & GYNECOLOGY

## 2018-12-02 PROCEDURE — 59025 FETAL NON-STRESS TEST: CPT

## 2018-12-02 PROCEDURE — 80053 COMPREHEN METABOLIC PANEL: CPT

## 2018-12-02 RX ORDER — PROPRANOLOL HYDROCHLORIDE 20 MG/1
20 TABLET ORAL EVERY 6 HOURS
Qty: 60 TAB | Refills: 0 | Status: SHIPPED | OUTPATIENT
Start: 2018-12-02 | End: 2019-12-13

## 2018-12-02 RX ORDER — METHIMAZOLE 5 MG/1
15 TABLET ORAL EVERY 6 HOURS
Qty: 90 TAB | Refills: 3 | Status: SHIPPED | OUTPATIENT
Start: 2018-12-02 | End: 2022-04-12

## 2018-12-02 RX ORDER — PREDNISONE 10 MG/1
TABLET ORAL
Qty: 24 QUANTITY SUFFICIENT | Refills: 0 | Status: SHIPPED | OUTPATIENT
Start: 2018-12-03 | End: 2019-12-13

## 2018-12-02 RX ORDER — POTASSIUM CHLORIDE 20 MEQ/1
20 TABLET, EXTENDED RELEASE ORAL 2 TIMES DAILY
Qty: 10 TAB | Refills: 0 | Status: SHIPPED | OUTPATIENT
Start: 2018-12-02 | End: 2019-12-13

## 2018-12-02 RX ORDER — PROPRANOLOL HYDROCHLORIDE 20 MG/1
20 TABLET ORAL EVERY 6 HOURS
Qty: 90 TAB | Refills: 11 | Status: SHIPPED | OUTPATIENT
Start: 2018-12-02 | End: 2018-12-02

## 2018-12-02 RX ADMIN — METHIMAZOLE 15 MG: 10 TABLET ORAL at 03:08

## 2018-12-02 RX ADMIN — PROPRANOLOL HYDROCHLORIDE 20 MG: 20 TABLET ORAL at 03:09

## 2018-12-02 RX ADMIN — POTASSIUM CHLORIDE 20 MEQ: 1500 TABLET, EXTENDED RELEASE ORAL at 06:34

## 2018-12-02 RX ADMIN — PROPRANOLOL HYDROCHLORIDE 20 MG: 20 TABLET ORAL at 08:01

## 2018-12-02 RX ADMIN — METHIMAZOLE 15 MG: 10 TABLET ORAL at 09:15

## 2018-12-02 RX ADMIN — ANTACID TABLETS 500 MG: 500 TABLET, CHEWABLE ORAL at 06:34

## 2018-12-02 RX ADMIN — PREDNISONE 40 MG: 20 TABLET ORAL at 06:35

## 2018-12-02 ASSESSMENT — PATIENT HEALTH QUESTIONNAIRE - PHQ9
1. LITTLE INTEREST OR PLEASURE IN DOING THINGS: NOT AT ALL
2. FEELING DOWN, DEPRESSED, IRRITABLE, OR HOPELESS: NOT AT ALL
SUM OF ALL RESPONSES TO PHQ9 QUESTIONS 1 AND 2: 0

## 2018-12-02 NOTE — CARE PLAN
Problem: Infection  Goal: Will remain free from infection  Outcome: PROGRESSING AS EXPECTED  Pt is afebrile and no s/s of infection.    Problem: Knowledge Deficit  Goal: Knowledge of disease process/condition, treatment plan, diagnostic tests, and medications will improve  Outcome: PROGRESSING AS EXPECTED  POC reviewed and understanding verbalized.

## 2018-12-02 NOTE — CARE PLAN
Problem: Knowledge Deficit  Goal: Knowledge of disease process/condition, treatment plan, diagnostic tests, and medications will improve  Outcome: PROGRESSING AS EXPECTED  Pt is up to date on the POC. She will ask questions as needed and the RN will notify her of any changes.

## 2018-12-02 NOTE — PROGRESS NOTES
S: Feeling much better  O: Afebrile       Pulse: 78-97 bpm       BP: 120/64 mmHg       EFM  Baseline 135 bpm.  Moderate variability, accelerations present. No decelerations.  Irregular contractions  A: 31 3/7 weeks      Hyperthyroid state controlled      Rheumatoid arthritis  P:  Discharge on propranolol and new dose of methimazole  Time;  15 minutes

## 2018-12-02 NOTE — PROGRESS NOTES
0700. Report from Inez AMIN. POC discussed and resumed.    1200. Dr. Long in room, orders to dc pt home with twice weekly NST'S and RX's for Propranolol, Methimazole, Potassium, and Prednisone. Pt given dc instructions with pre term labor precautions, kick counts and RX's. Pt has no questions at this time. She is awaiting her ride.    1320. Pt out with  in stable condition.

## 2018-12-02 NOTE — PROGRESS NOTES
S: Feeling much better  O: Afebrile       Pulse: 89 bpm       BP: 128/74 mmHg       EFM  Baseline 135 bpm.  Moderate variability, accelerations present. No decelerations.  A: 31 2/7 weeks      Hyperthyroid state      Rheumatoid arthritis  P:  D/C IV       Probable discharge in am if asymptomatic.       She will need propranolol on discharge.    Time;  15 minutes

## 2018-12-02 NOTE — PROGRESS NOTES
1900_ Assumed pt care. Report from SHANA Su RN. POC reviewed and understanding verbalized.  0700_ Report to SHANA Su RN.

## 2018-12-03 NOTE — DISCHARGE SUMMARY
DATE OF ADMISSION:  2018    DATE OF DISCHARGE:  2018     ADMISSION DIAGNOSIS:  Intrauterine pregnancy at 31 weeks with rheumatoid flare   and thyroid storm.    DISCHARGE DIAGNOSIS:  Intrauterine pregnancy at 31 weeks with rheumatoid flare   and thyroid storm.    HISTORY OF PRESENT ILLNESS:  This is a 43-year-old  who was admitted at   31 weeks and 1/7 days gestation who went to the emergency room complaining of   a rheumatoid flare.  The patient has known rheumatoid arthritis as well as   Graves' disease.  When she was at the emergency room, she did receive 125 mg   of IV Solu-Medrol, but then was discharged to labor and delivery because she   complained of contractions on labor and delivery, it was noted that the   patient had nausea, diarrhea, tachycardia and a tremor consistent with thyroid   storm.  The patient was then admitted and given IV hydration as well as   alterations in her medications, specifically Tapazole and propranolol.  Once   she was stabilized on these medications, she has been feeling generally well.    This morning, she states she feels much better.  She reports good fetal   movement, no contractions, no vaginal bleeding, no loss of fluid, good fetal   movement.  She states that she no longer have shaking of her hands and states   she is feeling much, much better.    PHYSICAL EXAMINATION:  VITAL SIGNS:  Stable and she is afebrile.  This morning, her pulse is 97,   blood pressure 120/64, and temperature 97.1.  HEART:  Regular rate and rhythm.  LUNGS:  Clear.  ABDOMEN:  Gravid and soft.  EXTREMITIES:  Show no clubbing, cyanosis or edema.  Fetal heart tones, NST was   reactive with no decelerations, category 1 tracing.  [place] shows no   contractions.    ASSESSMENT AND PLAN:  A 43-year-old female who is 31 weeks and 3/7 days   gestation today who was admitted for rheumatoid flare and thyrotoxicosis.    Patient will be discharged home today in good condition.  She will be given    methimazole 15 mg every 6 hours.  She is sent home on a prednisone taper.  She   will take 40 mg of prednisone 1 day, 30 mg for 3 days, 20 mg for 3 days, 10   mg for 3 days and 5 mg for 3 days.  She also sent home on K-Dur 20 mEq twice   daily and propranolol 20 mg p.o. b.i.d.  Patient is to follow up with Dr. Wiseman tomorrow for an NST.       ____________________________________     MD FOZIA Regan / NTS    DD:  12/02/2018 11:54:08  DT:  12/02/2018 19:08:36    D#:  [id number]  Job#:  [id number]

## 2019-01-06 ENCOUNTER — HOSPITAL ENCOUNTER (INPATIENT)
Facility: MEDICAL CENTER | Age: 44
LOS: 3 days | End: 2019-01-10
Attending: OBSTETRICS & GYNECOLOGY | Admitting: OBSTETRICS & GYNECOLOGY
Payer: MEDICAID

## 2019-01-06 DIAGNOSIS — G89.18 ACUTE POST-OPERATIVE PAIN: ICD-10-CM

## 2019-01-06 DIAGNOSIS — Z98.891 S/P CESAREAN SECTION: ICD-10-CM

## 2019-01-07 LAB
ABO GROUP BLD: NORMAL
BASOPHILS # BLD AUTO: 0.2 % (ref 0–1.8)
BASOPHILS # BLD: 0.02 K/UL (ref 0–0.12)
BLD GP AB SCN SERPL QL: NORMAL
CRYSTALS AMN MICRO: NORMAL
EOSINOPHIL # BLD AUTO: 0.08 K/UL (ref 0–0.51)
EOSINOPHIL NFR BLD: 0.7 % (ref 0–6.9)
ERYTHROCYTE [DISTWIDTH] IN BLOOD BY AUTOMATED COUNT: 47.1 FL (ref 35.9–50)
ERYTHROCYTE [DISTWIDTH] IN BLOOD BY AUTOMATED COUNT: 49.2 FL (ref 35.9–50)
HCT VFR BLD AUTO: 31.7 % (ref 37–47)
HCT VFR BLD AUTO: 38.3 % (ref 37–47)
HGB BLD-MCNC: 10 G/DL (ref 12–16)
HGB BLD-MCNC: 12.2 G/DL (ref 12–16)
IMM GRANULOCYTES # BLD AUTO: 0.05 K/UL (ref 0–0.11)
IMM GRANULOCYTES NFR BLD AUTO: 0.5 % (ref 0–0.9)
LYMPHOCYTES # BLD AUTO: 0.58 K/UL (ref 1–4.8)
LYMPHOCYTES NFR BLD: 5.4 % (ref 22–41)
MCH RBC QN AUTO: 23.6 PG (ref 27–33)
MCH RBC QN AUTO: 23.9 PG (ref 27–33)
MCHC RBC AUTO-ENTMCNC: 31.5 G/DL (ref 33.6–35)
MCHC RBC AUTO-ENTMCNC: 31.9 G/DL (ref 33.6–35)
MCV RBC AUTO: 73.9 FL (ref 81.4–97.8)
MCV RBC AUTO: 75.8 FL (ref 81.4–97.8)
MONOCYTES # BLD AUTO: 0.5 K/UL (ref 0–0.85)
MONOCYTES NFR BLD AUTO: 4.7 % (ref 0–13.4)
NEUTROPHILS # BLD AUTO: 9.45 K/UL (ref 2–7.15)
NEUTROPHILS NFR BLD: 88.5 % (ref 44–72)
NRBC # BLD AUTO: 0 K/UL
NRBC BLD-RTO: 0 /100 WBC
PLATELET # BLD AUTO: 278 K/UL (ref 164–446)
PLATELET # BLD AUTO: 295 K/UL (ref 164–446)
PMV BLD AUTO: 10.8 FL (ref 9–12.9)
PMV BLD AUTO: 11.3 FL (ref 9–12.9)
RBC # BLD AUTO: 4.18 M/UL (ref 4.2–5.4)
RBC # BLD AUTO: 5.18 M/UL (ref 4.2–5.4)
RH BLD: NORMAL
WBC # BLD AUTO: 10.7 K/UL (ref 4.8–10.8)
WBC # BLD AUTO: 12.9 K/UL (ref 4.8–10.8)

## 2019-01-07 PROCEDURE — 700111 HCHG RX REV CODE 636 W/ 250 OVERRIDE (IP)

## 2019-01-07 PROCEDURE — 700102 HCHG RX REV CODE 250 W/ 637 OVERRIDE(OP): Performed by: OBSTETRICS & GYNECOLOGY

## 2019-01-07 PROCEDURE — A9270 NON-COVERED ITEM OR SERVICE: HCPCS | Performed by: OBSTETRICS & GYNECOLOGY

## 2019-01-07 PROCEDURE — 700102 HCHG RX REV CODE 250 W/ 637 OVERRIDE(OP): Performed by: STUDENT IN AN ORGANIZED HEALTH CARE EDUCATION/TRAINING PROGRAM

## 2019-01-07 PROCEDURE — 85025 COMPLETE CBC W/AUTO DIFF WBC: CPT

## 2019-01-07 PROCEDURE — 86900 BLOOD TYPING SEROLOGIC ABO: CPT

## 2019-01-07 PROCEDURE — 700105 HCHG RX REV CODE 258: Performed by: ANESTHESIOLOGY

## 2019-01-07 PROCEDURE — 36415 COLL VENOUS BLD VENIPUNCTURE: CPT

## 2019-01-07 PROCEDURE — 89060 EXAM SYNOVIAL FLUID CRYSTALS: CPT

## 2019-01-07 PROCEDURE — 700111 HCHG RX REV CODE 636 W/ 250 OVERRIDE (IP): Performed by: OBSTETRICS & GYNECOLOGY

## 2019-01-07 PROCEDURE — A9270 NON-COVERED ITEM OR SERVICE: HCPCS | Performed by: STUDENT IN AN ORGANIZED HEALTH CARE EDUCATION/TRAINING PROGRAM

## 2019-01-07 PROCEDURE — 306828 HCHG ANES-TIME GENERAL: Performed by: OBSTETRICS & GYNECOLOGY

## 2019-01-07 PROCEDURE — 86850 RBC ANTIBODY SCREEN: CPT

## 2019-01-07 PROCEDURE — 59514 CESAREAN DELIVERY ONLY: CPT

## 2019-01-07 PROCEDURE — 86901 BLOOD TYPING SEROLOGIC RH(D): CPT

## 2019-01-07 PROCEDURE — A9270 NON-COVERED ITEM OR SERVICE: HCPCS | Performed by: ANESTHESIOLOGY

## 2019-01-07 PROCEDURE — 305385 HCHG SURGICAL SERVICES 1/4 HOUR: Performed by: OBSTETRICS & GYNECOLOGY

## 2019-01-07 PROCEDURE — 700102 HCHG RX REV CODE 250 W/ 637 OVERRIDE(OP): Performed by: ANESTHESIOLOGY

## 2019-01-07 PROCEDURE — 700111 HCHG RX REV CODE 636 W/ 250 OVERRIDE (IP): Performed by: ANESTHESIOLOGY

## 2019-01-07 PROCEDURE — 85027 COMPLETE CBC AUTOMATED: CPT

## 2019-01-07 PROCEDURE — 700101 HCHG RX REV CODE 250

## 2019-01-07 PROCEDURE — 304964 HCHG RECOVERY ROOM TIME 1HR: Performed by: OBSTETRICS & GYNECOLOGY

## 2019-01-07 PROCEDURE — 770002 HCHG ROOM/CARE - OB PRIVATE (112)

## 2019-01-07 RX ORDER — HALOPERIDOL 5 MG/ML
1 INJECTION INTRAMUSCULAR
Status: CANCELLED | OUTPATIENT
Start: 2019-01-07

## 2019-01-07 RX ORDER — METHYLERGONOVINE MALEATE 0.2 MG/ML
0.2 INJECTION INTRAVENOUS
Status: DISCONTINUED | OUTPATIENT
Start: 2019-01-07 | End: 2019-01-10 | Stop reason: HOSPADM

## 2019-01-07 RX ORDER — MORPHINE SULFATE 4 MG/ML
4 INJECTION, SOLUTION INTRAMUSCULAR; INTRAVENOUS
Status: DISCONTINUED | OUTPATIENT
Start: 2019-01-08 | End: 2019-01-10 | Stop reason: HOSPADM

## 2019-01-07 RX ORDER — CEFAZOLIN SODIUM 1 G/3ML
1 INJECTION, POWDER, FOR SOLUTION INTRAMUSCULAR; INTRAVENOUS ONCE
Status: DISCONTINUED | OUTPATIENT
Start: 2019-01-07 | End: 2019-01-07 | Stop reason: HOSPADM

## 2019-01-07 RX ORDER — PROPRANOLOL HYDROCHLORIDE 20 MG/1
15 TABLET ORAL EVERY 8 HOURS
Status: DISCONTINUED | OUTPATIENT
Start: 2019-01-07 | End: 2019-01-07

## 2019-01-07 RX ORDER — ACETAMINOPHEN 325 MG/1
975 TABLET ORAL EVERY 6 HOURS PRN
Status: DISCONTINUED | OUTPATIENT
Start: 2019-01-07 | End: 2019-01-07 | Stop reason: HOSPADM

## 2019-01-07 RX ORDER — CARBOPROST TROMETHAMINE 250 UG/ML
250 INJECTION, SOLUTION INTRAMUSCULAR
Status: DISCONTINUED | OUTPATIENT
Start: 2019-01-07 | End: 2019-01-10 | Stop reason: HOSPADM

## 2019-01-07 RX ORDER — OXYCODONE HYDROCHLORIDE 5 MG/1
5 TABLET ORAL EVERY 4 HOURS PRN
Status: DISCONTINUED | OUTPATIENT
Start: 2019-01-08 | End: 2019-01-10 | Stop reason: HOSPADM

## 2019-01-07 RX ORDER — METOPROLOL TARTRATE 1 MG/ML
1 INJECTION, SOLUTION INTRAVENOUS
Status: CANCELLED | OUTPATIENT
Start: 2019-01-07

## 2019-01-07 RX ORDER — MISOPROSTOL 200 UG/1
800 TABLET ORAL
Status: DISCONTINUED | OUTPATIENT
Start: 2019-01-07 | End: 2019-01-10 | Stop reason: HOSPADM

## 2019-01-07 RX ORDER — ONDANSETRON 2 MG/ML
4 INJECTION INTRAMUSCULAR; INTRAVENOUS EVERY 6 HOURS PRN
Status: DISCONTINUED | OUTPATIENT
Start: 2019-01-08 | End: 2019-01-10 | Stop reason: HOSPADM

## 2019-01-07 RX ORDER — MISOPROSTOL 200 UG/1
600 TABLET ORAL
Status: DISCONTINUED | OUTPATIENT
Start: 2019-01-07 | End: 2019-01-10 | Stop reason: HOSPADM

## 2019-01-07 RX ORDER — SODIUM CHLORIDE, SODIUM LACTATE, POTASSIUM CHLORIDE, CALCIUM CHLORIDE 600; 310; 30; 20 MG/100ML; MG/100ML; MG/100ML; MG/100ML
INJECTION, SOLUTION INTRAVENOUS PRN
Status: DISCONTINUED | OUTPATIENT
Start: 2019-01-07 | End: 2019-01-10 | Stop reason: HOSPADM

## 2019-01-07 RX ORDER — MIDAZOLAM HYDROCHLORIDE 1 MG/ML
1 INJECTION INTRAMUSCULAR; INTRAVENOUS
Status: CANCELLED | OUTPATIENT
Start: 2019-01-07

## 2019-01-07 RX ORDER — HYDROMORPHONE HYDROCHLORIDE 1 MG/ML
0.2 INJECTION, SOLUTION INTRAMUSCULAR; INTRAVENOUS; SUBCUTANEOUS
Status: DISCONTINUED | OUTPATIENT
Start: 2019-01-07 | End: 2019-01-08

## 2019-01-07 RX ORDER — HYDROMORPHONE HYDROCHLORIDE 1 MG/ML
0.4 INJECTION, SOLUTION INTRAMUSCULAR; INTRAVENOUS; SUBCUTANEOUS
Status: DISCONTINUED | OUTPATIENT
Start: 2019-01-07 | End: 2019-01-08

## 2019-01-07 RX ORDER — SODIUM CHLORIDE, SODIUM LACTATE, POTASSIUM CHLORIDE, CALCIUM CHLORIDE 600; 310; 30; 20 MG/100ML; MG/100ML; MG/100ML; MG/100ML
INJECTION, SOLUTION INTRAVENOUS CONTINUOUS
Status: DISCONTINUED | OUTPATIENT
Start: 2019-01-07 | End: 2019-01-10 | Stop reason: HOSPADM

## 2019-01-07 RX ORDER — ACETAMINOPHEN 500 MG
1000 TABLET ORAL EVERY 6 HOURS
Status: COMPLETED | OUTPATIENT
Start: 2019-01-07 | End: 2019-01-08

## 2019-01-07 RX ORDER — PROPRANOLOL HYDROCHLORIDE 10 MG/1
10 TABLET ORAL EVERY 8 HOURS
Status: DISCONTINUED | OUTPATIENT
Start: 2019-01-07 | End: 2019-01-10 | Stop reason: HOSPADM

## 2019-01-07 RX ORDER — OXYCODONE HYDROCHLORIDE 10 MG/1
10 TABLET ORAL EVERY 4 HOURS PRN
Status: DISCONTINUED | OUTPATIENT
Start: 2019-01-07 | End: 2019-01-07

## 2019-01-07 RX ORDER — OXYCODONE HYDROCHLORIDE AND ACETAMINOPHEN 5; 325 MG/1; MG/1
1 TABLET ORAL
Status: CANCELLED | OUTPATIENT
Start: 2019-01-07

## 2019-01-07 RX ORDER — DIPHENHYDRAMINE HYDROCHLORIDE 50 MG/ML
12.5 INJECTION INTRAMUSCULAR; INTRAVENOUS EVERY 6 HOURS PRN
Status: DISCONTINUED | OUTPATIENT
Start: 2019-01-07 | End: 2019-01-08

## 2019-01-07 RX ORDER — ONDANSETRON 4 MG/1
4 TABLET, ORALLY DISINTEGRATING ORAL EVERY 6 HOURS PRN
Status: DISCONTINUED | OUTPATIENT
Start: 2019-01-07 | End: 2019-01-07

## 2019-01-07 RX ORDER — OXYCODONE HYDROCHLORIDE 10 MG/1
10 TABLET ORAL EVERY 4 HOURS PRN
Status: DISCONTINUED | OUTPATIENT
Start: 2019-01-08 | End: 2019-01-10 | Stop reason: HOSPADM

## 2019-01-07 RX ORDER — DOCUSATE SODIUM 100 MG/1
100 CAPSULE, LIQUID FILLED ORAL 2 TIMES DAILY PRN
Status: DISCONTINUED | OUTPATIENT
Start: 2019-01-07 | End: 2019-01-10 | Stop reason: HOSPADM

## 2019-01-07 RX ORDER — IBUPROFEN 800 MG/1
800 TABLET ORAL EVERY 8 HOURS PRN
Status: DISCONTINUED | OUTPATIENT
Start: 2019-01-08 | End: 2019-01-10 | Stop reason: HOSPADM

## 2019-01-07 RX ORDER — ONDANSETRON 2 MG/ML
4 INJECTION INTRAMUSCULAR; INTRAVENOUS EVERY 6 HOURS PRN
Status: DISCONTINUED | OUTPATIENT
Start: 2019-01-07 | End: 2019-01-07

## 2019-01-07 RX ORDER — METHIMAZOLE 10 MG/1
10 TABLET ORAL 3 TIMES DAILY
Status: DISCONTINUED | OUTPATIENT
Start: 2019-01-07 | End: 2019-01-10 | Stop reason: HOSPADM

## 2019-01-07 RX ORDER — DIPHENHYDRAMINE HYDROCHLORIDE 50 MG/ML
12.5 INJECTION INTRAMUSCULAR; INTRAVENOUS
Status: CANCELLED | OUTPATIENT
Start: 2019-01-07

## 2019-01-07 RX ORDER — METOCLOPRAMIDE HYDROCHLORIDE 5 MG/ML
10 INJECTION INTRAMUSCULAR; INTRAVENOUS ONCE
Status: COMPLETED | OUTPATIENT
Start: 2019-01-07 | End: 2019-01-07

## 2019-01-07 RX ORDER — KETOROLAC TROMETHAMINE 30 MG/ML
30 INJECTION, SOLUTION INTRAMUSCULAR; INTRAVENOUS EVERY 6 HOURS
Status: COMPLETED | OUTPATIENT
Start: 2019-01-07 | End: 2019-01-08

## 2019-01-07 RX ORDER — DIPHENHYDRAMINE HYDROCHLORIDE 50 MG/ML
25 INJECTION INTRAMUSCULAR; INTRAVENOUS EVERY 6 HOURS PRN
Status: DISCONTINUED | OUTPATIENT
Start: 2019-01-07 | End: 2019-01-08

## 2019-01-07 RX ORDER — MORPHINE SULFATE 4 MG/ML
4 INJECTION, SOLUTION INTRAMUSCULAR; INTRAVENOUS
Status: DISCONTINUED | OUTPATIENT
Start: 2019-01-07 | End: 2019-01-07

## 2019-01-07 RX ORDER — OXYTOCIN 10 [USP'U]/ML
10 INJECTION, SOLUTION INTRAMUSCULAR; INTRAVENOUS ONCE
Status: CANCELLED | OUTPATIENT
Start: 2019-01-07 | End: 2019-01-07

## 2019-01-07 RX ORDER — ONDANSETRON 4 MG/1
4 TABLET, ORALLY DISINTEGRATING ORAL EVERY 6 HOURS PRN
Status: DISCONTINUED | OUTPATIENT
Start: 2019-01-08 | End: 2019-01-10 | Stop reason: HOSPADM

## 2019-01-07 RX ORDER — SODIUM CHLORIDE, SODIUM GLUCONATE, SODIUM ACETATE, POTASSIUM CHLORIDE AND MAGNESIUM CHLORIDE 526; 502; 368; 37; 30 MG/100ML; MG/100ML; MG/100ML; MG/100ML; MG/100ML
1500 INJECTION, SOLUTION INTRAVENOUS ONCE
Status: COMPLETED | OUTPATIENT
Start: 2019-01-07 | End: 2019-01-07

## 2019-01-07 RX ORDER — OXYCODONE HYDROCHLORIDE 5 MG/1
5 TABLET ORAL EVERY 4 HOURS PRN
Status: DISCONTINUED | OUTPATIENT
Start: 2019-01-07 | End: 2019-01-08

## 2019-01-07 RX ORDER — OXYCODONE HYDROCHLORIDE 5 MG/1
5 TABLET ORAL EVERY 4 HOURS PRN
Status: DISCONTINUED | OUTPATIENT
Start: 2019-01-07 | End: 2019-01-07

## 2019-01-07 RX ORDER — OXYCODONE HYDROCHLORIDE AND ACETAMINOPHEN 5; 325 MG/1; MG/1
2 TABLET ORAL
Status: CANCELLED | OUTPATIENT
Start: 2019-01-07

## 2019-01-07 RX ORDER — CITRIC ACID/SODIUM CITRATE 334-500MG
30 SOLUTION, ORAL ORAL ONCE
Status: COMPLETED | OUTPATIENT
Start: 2019-01-07 | End: 2019-01-07

## 2019-01-07 RX ORDER — ONDANSETRON 2 MG/ML
4 INJECTION INTRAMUSCULAR; INTRAVENOUS
Status: CANCELLED | OUTPATIENT
Start: 2019-01-07

## 2019-01-07 RX ORDER — IBUPROFEN 800 MG/1
800 TABLET ORAL EVERY 8 HOURS PRN
Status: DISCONTINUED | OUTPATIENT
Start: 2019-01-07 | End: 2019-01-07

## 2019-01-07 RX ORDER — PREDNISONE 1 MG/1
4 TABLET ORAL DAILY
Status: DISCONTINUED | OUTPATIENT
Start: 2019-01-07 | End: 2019-01-10 | Stop reason: HOSPADM

## 2019-01-07 RX ADMIN — ACETAMINOPHEN 1000 MG: 500 TABLET ORAL at 08:08

## 2019-01-07 RX ADMIN — METHIMAZOLE 10 MG: 10 TABLET ORAL at 17:35

## 2019-01-07 RX ADMIN — METHIMAZOLE 10 MG: 10 TABLET ORAL at 08:09

## 2019-01-07 RX ADMIN — METOCLOPRAMIDE 10 MG: 5 INJECTION, SOLUTION INTRAMUSCULAR; INTRAVENOUS at 03:27

## 2019-01-07 RX ADMIN — OXYCODONE HYDROCHLORIDE 5 MG: 5 TABLET ORAL at 13:22

## 2019-01-07 RX ADMIN — METHIMAZOLE 10 MG: 10 TABLET ORAL at 12:42

## 2019-01-07 RX ADMIN — KETOROLAC TROMETHAMINE 30 MG: 30 INJECTION, SOLUTION INTRAMUSCULAR at 20:24

## 2019-01-07 RX ADMIN — PROPRANOLOL HYDROCHLORIDE 10 MG: 10 TABLET ORAL at 14:19

## 2019-01-07 RX ADMIN — PREDNISONE 4 MG: 1 TABLET ORAL at 08:09

## 2019-01-07 RX ADMIN — PROPRANOLOL HYDROCHLORIDE 10 MG: 10 TABLET ORAL at 22:48

## 2019-01-07 RX ADMIN — Medication 2000 ML/HR: at 04:30

## 2019-01-07 RX ADMIN — SODIUM CITRATE AND CITRIC ACID MONOHYDRATE 30 ML: 500; 334 SOLUTION ORAL at 03:27

## 2019-01-07 RX ADMIN — ACETAMINOPHEN 1000 MG: 500 TABLET ORAL at 20:23

## 2019-01-07 RX ADMIN — KETOROLAC TROMETHAMINE 30 MG: 30 INJECTION, SOLUTION INTRAMUSCULAR at 08:09

## 2019-01-07 RX ADMIN — PROPRANOLOL HYDROCHLORIDE 15 MG: 20 TABLET ORAL at 08:10

## 2019-01-07 RX ADMIN — SODIUM CHLORIDE, SODIUM GLUCONATE, SODIUM ACETATE, POTASSIUM CHLORIDE AND MAGNESIUM CHLORIDE 1000 ML: 526; 502; 368; 37; 30 INJECTION, SOLUTION INTRAVENOUS at 02:07

## 2019-01-07 RX ADMIN — ACETAMINOPHEN 1000 MG: 500 TABLET ORAL at 14:19

## 2019-01-07 RX ADMIN — FAMOTIDINE 20 MG: 10 INJECTION INTRAVENOUS at 03:27

## 2019-01-07 RX ADMIN — KETOROLAC TROMETHAMINE 30 MG: 30 INJECTION, SOLUTION INTRAMUSCULAR at 14:19

## 2019-01-07 RX ADMIN — ACETAMINOPHEN 975 MG: 325 TABLET, FILM COATED ORAL at 03:26

## 2019-01-07 RX ADMIN — Medication 125 ML/HR: at 06:09

## 2019-01-07 ASSESSMENT — PAIN SCALES - GENERAL
PAINLEVEL_OUTOF10: 1
PAINLEVEL_OUTOF10: 2
PAINLEVEL_OUTOF10: 4
PAINLEVEL_OUTOF10: 0
PAINLEVEL_OUTOF10: 3
PAINLEVEL_OUTOF10: 4
PAINLEVEL_OUTOF10: 2
PAINLEVEL_OUTOF10: 4
PAINLEVEL_OUTOF10: 3
PAINLEVEL_OUTOF10: 4
PAINLEVEL_OUTOF10: 3
PAINLEVEL_OUTOF10: 2
PAINLEVEL_OUTOF10: 4
PAINLEVEL_OUTOF10: 3

## 2019-01-07 ASSESSMENT — LIFESTYLE VARIABLES: EVER_SMOKED: NEVER

## 2019-01-07 NOTE — PROGRESS NOTES
Report received at 0700. Assessment completed: dressing over abdominal incision. SCDs in use. Avendano to gravity.  in use. Fundus firm, lochia light rubra. Patient states pain 2/10; medicated per MAR. Patient would like medication offered when available. Scheduled medications given. Bed in lowest locked position. Call light in place. All questions and concerns addressed. Will continue to monitor.

## 2019-01-07 NOTE — H&P
History and Physical      Isabell Serrano is a 43 y.o. female  at 36w4d who presents for SROM    Subjective:   positive  For CTXS.   negative Feels pain   positive for LOF  negative for vaginal bleeding.   positive for fetal movement    ROS: A comprehensive review of systems was negative.    Past Medical History:   Diagnosis Date   • Arthritis    • Arthritis, rheumatoid (HCC)    • RA (rheumatoid arthritis) (HCC) 2013     Past Surgical History:   Procedure Laterality Date   • MAMMOPLASTY AUGMENTATION  2011    Performed by MARIBELL GONZALES at SURGERY [facility]     OB History    Para Term  AB Living   3 2 2   0 2   SAB TAB Ectopic Molar Multiple Live Births       0            # Outcome Date GA Lbr Agustin/2nd Weight Sex Delivery Anes PTL Lv   3 Current            2 Term            1 Term                 Social History     Social History   • Marital status: Single     Spouse name: N/A   • Number of children: N/A   • Years of education: N/A     Occupational History   • Not on file.     Social History Main Topics   • Smoking status: Never Smoker   • Smokeless tobacco: Never Used   • Alcohol use 0.5 oz/week     1 Standard drinks or equivalent per week      Comment: Once a month   • Drug use: No   • Sexual activity: Yes     Partners: Male     Other Topics Concern   • Not on file     Social History Narrative   • No narrative on file     Allergies: Patient has no known allergies.    Current Facility-Administered Medications:   •  lactated ringers (LR) infusion, , Intravenous, Continuous, Carlee Long M.D.  •  ceFAZolin (ANCEF) injection 1 g, 1 g, Intravenous, Once, Carlee Long M.D.  •  Sod Citrate-Citric Acid (BICITRA) 500-334 MG/5ML solution 30 mL, 30 mL, Oral, Once, Agnes Spear M.D.  •  famotidine (PEPCID) injection 20 mg, 20 mg, Intravenous, Once, Agnes Spear M.D.  •  metoclopramide (REGLAN) injection 10 mg, 10 mg, Intravenous, Once, Agnes Spear  "M.D.  •  oxytocin (PITOCIN) 20 UNITS/1000ML LR, , , ,     Prenatal care with Dr Wiseman starting at 8 weeks with following problems:  Patient Active Problem List    Diagnosis Date Noted   • Other thyrotoxicosis with thyrotoxic crisis or storm 02/22/2016   • Seropositive rheumatoid arthritis (HCC) 09/16/2014   • H/O breast augmentation 09/16/2014   • Rheumatoid arthritis of hand (HCC) 11/05/2013               Objective:      Blood pressure 139/80, pulse 98, temperature 37.4 °C (99.4 °F), temperature source Temporal, height 1.778 m (5' 10\"), weight 77.1 kg (170 lb), not currently breastfeeding.    General:   no acute distress   Skin:   normal   HEENT:  Neck supple with midline trachea   Lungs:   CTA bilateral   Heart:   S1, S2 normal, no murmur, click, rub or gallop, regular rate and rhythm, brisk carotid upstroke without bruits, peripheral pulses very brisk, chest is clear without rales or wheezing, no pedal edema, no JVD, no hepatosplenomegaly   Abdomen:   gravid, NT   EFW:  6 lbs   Pelvis:  adequate with gynecoid pelvis   FHT:  128 BPM   Uterine Size: S=D   Presentations: Cephalic   Cervix:     Dilation: 2cm visual    Effacement:     Station:      Consistency:     Position:      Lab Review  Lab:   Blood type: B     Recent Results (from the past 5880 hour(s))   RUBELLA    Collection Time: 11/13/18 12:00 AM   Result Value Ref Range    Rubella Abs, Igm immune    CBC WITH DIFFERENTIAL    Collection Time: 11/30/18 11:00 AM   Result Value Ref Range    WBC 16.6 (H) 4.8 - 10.8 K/uL    RBC 5.30 4.20 - 5.40 M/uL    Hemoglobin 12.4 12.0 - 16.0 g/dL    Hematocrit 38.6 37.0 - 47.0 %    MCV 72.8 (L) 81.4 - 97.8 fL    MCH 23.4 (L) 27.0 - 33.0 pg    MCHC 32.1 (L) 33.6 - 35.0 g/dL    RDW 43.6 35.9 - 50.0 fL    Platelet Count 329 164 - 446 K/uL    MPV 11.2 9.0 - 12.9 fL    Neutrophils-Polys 94.70 (H) 44.00 - 72.00 %    Lymphocytes 3.60 (L) 22.00 - 41.00 %    Monocytes 1.10 0.00 - 13.40 %    Eosinophils 0.00 0.00 - 6.90 %    Basophils " 0.10 0.00 - 1.80 %    Immature Granulocytes 0.50 0.00 - 0.90 %    Nucleated RBC 0.00 /100 WBC    Neutrophils (Absolute) 15.70 (H) 2.00 - 7.15 K/uL    Lymphs (Absolute) 0.59 (L) 1.00 - 4.80 K/uL    Monos (Absolute) 0.19 0.00 - 0.85 K/uL    Eos (Absolute) 0.00 0.00 - 0.51 K/uL    Baso (Absolute) 0.02 0.00 - 0.12 K/uL    Immature Granulocytes (abs) 0.09 0.00 - 0.11 K/uL    NRBC (Absolute) 0.00 K/uL   COMP METABOLIC PANEL    Collection Time: 11/30/18 11:00 AM   Result Value Ref Range    Sodium 135 135 - 145 mmol/L    Potassium 3.5 (L) 3.6 - 5.5 mmol/L    Chloride 105 96 - 112 mmol/L    Co2 17 (L) 20 - 33 mmol/L    Anion Gap 13.0 (H) 0.0 - 11.9    Glucose 87 65 - 99 mg/dL    Bun 6 (L) 8 - 22 mg/dL    Creatinine 0.38 (L) 0.50 - 1.40 mg/dL    Calcium 8.9 8.5 - 10.5 mg/dL    AST(SGOT) 9 (L) 12 - 45 U/L    ALT(SGPT) 6 2 - 50 U/L    Alkaline Phosphatase 105 (H) 30 - 99 U/L    Total Bilirubin 0.6 0.1 - 1.5 mg/dL    Albumin 3.4 3.2 - 4.9 g/dL    Total Protein 7.4 6.0 - 8.2 g/dL    Globulin 4.0 (H) 1.9 - 3.5 g/dL    A-G Ratio 0.9 g/dL   TSH    Collection Time: 11/30/18 11:00 AM   Result Value Ref Range    TSH <0.005 (L) 0.380 - 5.330 uIU/mL   FREE THYROXINE    Collection Time: 11/30/18 11:00 AM   Result Value Ref Range    Free T-4 1.92 (H) 0.53 - 1.43 ng/dL   WESTERGREN SED RATE    Collection Time: 11/30/18 11:00 AM   Result Value Ref Range    Sed Rate Westergren 65 (H) 0 - 20 mm/hour   T3 FREE    Collection Time: 11/30/18 11:00 AM   Result Value Ref Range    T3,Free 5.14 (H) 2.40 - 4.20 pg/mL   COD - Adult (Type and Screen)    Collection Time: 11/30/18 11:00 AM   Result Value Ref Range    ABO Grouping Only B     Rh Grouping Only POS     Antibody Screen-Cod NEG    ESTIMATED GFR    Collection Time: 11/30/18 11:00 AM   Result Value Ref Range    GFR If African American >60 >60 mL/min/1.73 m 2    GFR If Non African American >60 >60 mL/min/1.73 m 2   EKG    Collection Time: 11/30/18  1:03 PM   Result Value Ref Range    Report        [facility]    Test Date:  2018  Pt Name:    MICH GRAHAM                 Department: 22  MRN:        [id number]                      Room:       S234  Gender:     Female                       Technician: NAVEED  :        [id number]                   Requested By:OTTO JEFFREY  Order #:    [id number]                    Reading MD: John Amaya MD    Measurements  Intervals                                Axis  Rate:       109                          P:          48  OR:         188                          QRS:        45  QRSD:       80                           T:          52  QT:         336  QTc:        453    Interpretive Statements  SINUS TACHYCARDIA  No previous ECG available for comparison    Electronically Signed On 2018 14:01:14 PST by John Amaya MD     ABO AND RH CONFIRMATION    Collection Time: 18  1:57 PM   Result Value Ref Range    ABO Confirm B     Second Rh Group POS    URINALYSIS    Collection Time: 18  3:30 PM   Result Value Ref Range    Color Yellow     Character Clear     Specific Gravity 1.008 <1.035    Ph 5.5 5.0 - 8.0    Glucose Negative Negative mg/dL    Ketones 40 (A) Negative mg/dL    Protein Negative Negative mg/dL    Bilirubin Negative Negative    Urobilinogen, Urine 0.2 Negative    Nitrite Negative Negative    Leukocyte Esterase Negative Negative    Occult Blood Negative Negative    Micro Urine Req see below    COMP METABOLIC PANEL    Collection Time: 18  2:56 AM   Result Value Ref Range    Sodium 138 135 - 145 mmol/L    Potassium 3.2 (L) 3.6 - 5.5 mmol/L    Chloride 112 96 - 112 mmol/L    Co2 19 (L) 20 - 33 mmol/L    Anion Gap 7.0 0.0 - 11.9    Glucose 97 65 - 99 mg/dL    Bun 5 (L) 8 - 22 mg/dL    Creatinine 0.33 (L) 0.50 - 1.40 mg/dL    Calcium 8.2 (L) 8.5 - 10.5 mg/dL    AST(SGOT) 7 (L) 12 - 45 U/L    ALT(SGPT) 5 2 - 50 U/L    Alkaline Phosphatase 78 30 - 99 U/L    Total Bilirubin 0.5 0.1 - 1.5 mg/dL    Albumin 2.6 (L) 3.2 - 4.9 g/dL    Total  Protein 5.6 (L) 6.0 - 8.2 g/dL    Globulin 3.0 1.9 - 3.5 g/dL    A-G Ratio 0.9 g/dL   ESTIMATED GFR    Collection Time: 12/01/18  2:56 AM   Result Value Ref Range    GFR If African American >60 >60 mL/min/1.73 m 2    GFR If Non African American >60 >60 mL/min/1.73 m 2   COMP METABOLIC PANEL    Collection Time: 12/02/18  3:52 AM   Result Value Ref Range    Sodium 139 135 - 145 mmol/L    Potassium 3.3 (L) 3.6 - 5.5 mmol/L    Chloride 113 (H) 96 - 112 mmol/L    Co2 19 (L) 20 - 33 mmol/L    Anion Gap 7.0 0.0 - 11.9    Glucose 84 65 - 99 mg/dL    Bun 5 (L) 8 - 22 mg/dL    Creatinine 0.37 (L) 0.50 - 1.40 mg/dL    Calcium 8.4 (L) 8.5 - 10.5 mg/dL    AST(SGOT) 7 (L) 12 - 45 U/L    ALT(SGPT) 5 2 - 50 U/L    Alkaline Phosphatase 72 30 - 99 U/L    Total Bilirubin 0.3 0.1 - 1.5 mg/dL    Albumin 2.5 (L) 3.2 - 4.9 g/dL    Total Protein 5.4 (L) 6.0 - 8.2 g/dL    Globulin 2.9 1.9 - 3.5 g/dL    A-G Ratio 0.9 g/dL   ESTIMATED GFR    Collection Time: 12/02/18  3:52 AM   Result Value Ref Range    GFR If African American >60 >60 mL/min/1.73 m 2    GFR If Non African American >60 >60 mL/min/1.73 m 2   FERN TEST    Collection Time: 01/07/19 12:14 AM   Result Value Ref Range    Fern Test On Amniotic Fluid see below Not present   CBC with Differential    Collection Time: 01/07/19 12:56 AM   Result Value Ref Range    WBC 10.7 4.8 - 10.8 K/uL    RBC 5.18 4.20 - 5.40 M/uL    Hemoglobin 12.2 12.0 - 16.0 g/dL    Hematocrit 38.3 37.0 - 47.0 %    MCV 73.9 (L) 81.4 - 97.8 fL    MCH 23.6 (L) 27.0 - 33.0 pg    MCHC 31.9 (L) 33.6 - 35.0 g/dL    RDW 47.1 35.9 - 50.0 fL    Platelet Count 295 164 - 446 K/uL    MPV 10.8 9.0 - 12.9 fL    Neutrophils-Polys 88.50 (H) 44.00 - 72.00 %    Lymphocytes 5.40 (L) 22.00 - 41.00 %    Monocytes 4.70 0.00 - 13.40 %    Eosinophils 0.70 0.00 - 6.90 %    Basophils 0.20 0.00 - 1.80 %    Immature Granulocytes 0.50 0.00 - 0.90 %    Nucleated RBC 0.00 /100 WBC    Neutrophils (Absolute) 9.45 (H) 2.00 - 7.15 K/uL    Lymphs  (Absolute) 0.58 (L) 1.00 - 4.80 K/uL    Monos (Absolute) 0.50 0.00 - 0.85 K/uL    Eos (Absolute) 0.08 0.00 - 0.51 K/uL    Baso (Absolute) 0.02 0.00 - 0.12 K/uL    Immature Granulocytes (abs) 0.05 0.00 - 0.11 K/uL    NRBC (Absolute) 0.00 K/uL   COD - Adult (Type and Screen)    Collection Time: 19 12:56 AM   Result Value Ref Range    ABO Grouping Only B     Rh Grouping Only POS     Antibody Screen-Cod NEG         Assessment:   Isabell Serrano at 36w4d  Labor status: SROM  Obstetrical history significant for   Patient Active Problem List    Diagnosis Date Noted   • Other thyrotoxicosis with thyrotoxic crisis or storm 2016   • Seropositive rheumatoid arthritis ([facility]) 2014   • H/O breast augmentation 2014   • Rheumatoid arthritis of hand ([facility]) 2013   . Placenta Previa     Plan:     Admit to L&D  GBS negative  Admit 1 LT    Discussed with the patient indications for  delivery. The patient voiced understanding of indications for  section at this time.    Discussed with the patient the risks of  delivery. The risks include infection, bleeding, scarring, damage to other organs in the area of operation. Specifically organs that can be damaged are bowel, bladder, ureters. I also discussed with the patient the risk of wound infection and wound breakdown. We discussed that these risks are greater in people that have diabetes or obesity. I also discussed the risk of emergency blood transfusion during procedure as well as emergency hysterectomy during procedure.        Patient had the opportunity to ask questions regarding procedures. All questions answered to the patient's satisfaction.  Proceed with  delivery

## 2019-01-07 NOTE — PROGRESS NOTES
Pt arrived to L&D unit with c/o SROM. intermittent UCs, and slight VB. Reports +FM.     0000- Dr. Marinelli at bedside for sterile speculum exam and swab for FERN test. Will send to lab. Pt will be a c/s due to complete previa.   0015- TOCO/ EFM applied.   0100- IV Started  labs drawn: pt will be prepped for primary c/s.   0604- Pt transported to PPU via gurney with infant in stable condition. FOB at bedside with all personal belongings.  0615- Report given to Angie. RN. 2 RN band verification completed.

## 2019-01-07 NOTE — OR SURGEON
Immediate Post OP Note    PreOp Diagnosis: IUP at 36 4/7 weeks partial posterior previa, SROM, Hyperthyroid, rheumatoid arthritis    PostOp Diagnosis: same    Procedure(s):  PRIMARY C SECTION - Wound Class: Clean Contaminated    Surgeon(s):  DEBBIE Oshea M.D.    Anesthesiologist/Type of Anesthesia:  Anesthesiologist: Agnes Spear M.D./Spinal    Surgical Staff:  Circulator: Leatha Claudio R.N.  Scrub Person: Deedee KEE Baby  Nurse: Rivka Lebron R.N.    Specimens removed if any:  * No specimens in log *    Estimated Blood Loss: 900 cc    Findings: female presentation vertex APGAR 8/9  Uterus Normal, Tubes Normal, ovaries Normal        Complications: none        2019 4:57 AM Carlee Long M.D.

## 2019-01-07 NOTE — H&P
DATE OF ADMISSSION:  2019    PREOPERATIVE DIAGNOSES:  Intrauterine pregnancy at 35 weeks with a posterior   placenta previa with spontaneous rupture of membranes, rheumatoid arthritis,   hyperthyroidism with history of thyroid storm.    HISTORY AND PHYSICAL:  This is a 43-year-old -0-0-2, at approximately 35   weeks who is an established patient of Dr. Wiseman.  She presented to labor and   delivery complaining of spontaneous rupture of membranes and bloody show.    Patient is currently without other complaints.  She reports an occasional   contraction, small amount of vaginal bleeding, and good fetal movement.    REVIEW OF SYSTEMS:  Negative for nausea, vomiting, chest pain, shortness of   breath or fever.    PAST MEDICAL HISTORY:  Rheumatoid arthritis and hyperthyroidism.    PAST SURGICAL HISTORY:  None.    MEDICATIONS:  Patient takes propranolol 15 mg p.o. t.i.d., methimazole 10 mg   p.o. t.i.d., and prednisone 4 mg p.o. daily.    OBSTETRICAL HISTORY:  She has a history of 2 previous vaginal deliveries.    PHYSICAL EXAMINATION:  GENERAL:  She is a well-developed, well-nourished female, in no apparent   distress.  VITAL SIGNS:  Stable.  She is afebrile.  HEART:  Regular rate and rhythm.  LUNGS:  Clear to auscultation bilaterally.  ABDOMEN:  Soft and nontender.  EXTREMITIES:  Show no clubbing, cyanosis or edema.  PELVIC:  Speculum exam reveals gross pooling within the vaginal vault.    PREOPERATIVE LABS:  The patient's blood type is B positive, RPR nonreactive,   hepatitis negative, rubella immune, normal 1-hour Glucola testing, normal   genetic screening.    Obstetrical ultrasound findings revealed posterior partial placenta previa.    Most recent TSH was from 2018 is 0.005 with a free T4 of 1.92, and free   T3 of 5.14.    ASSESSMENT AND PLAN:  A 43-year-old female with an intrauterine pregnancy at   approximately 36 weeks and 4 days who had spontaneous rupture of membranes,   partial placenta  previa, hyperthyroidism and rheumatoid arthritis.  Patient   will be prepped for primary  delivery.  I discussed with patient risks   of procedure, which can include infection, bleeding, scarring, damage to   bowel, bladder, ureters and especially emergency blood transfusion and   emergency hysterectomy secondary to low lying placenta previa.  Patient voiced   understanding of the risks as described and agrees to proceed as determined.       ____________________________________     MD FOZIA Regan / NTS    DD:  2019 00:41:32  DT:  2019 01:24:34    D#:  [id number]  Job#:  [id number]

## 2019-01-07 NOTE — OP REPORT
DATE OF SERVICE:  2019    PREOPERATIVE DIAGNOSES:  Intrauterine pregnancy at 36 and 4/7 weeks, partial   posterior placenta previa, spontaneous rupture of membranes, hypothyroidism   and rheumatoid arthritis.    POSTOPERATIVE DIAGNOSES:  Intrauterine pregnancy at 36 and 4/7 weeks, partial   posterior placenta previa, spontaneous rupture of membranes, hypothyroidism   and rheumatoid arthritis.    PROCEDURE PERFORMED:  Primary low-transverse .    SURGEON:  Carlee Long MD    ASSISTANT:  Skyla Do MD    ANESTHESIA:  Spinal.    ANESTHESIOLOGIST:  Dr. Spear.    ESTIMATED BLOOD LOSS:  900 mL    INTRAOPERATIVE FINDINGS:  A female infant in vertex presentation with Apgars   of 8 and 9, normal uterus, tubes, and ovaries.  The placenta was noted to be   posterior and large venous sinuses noted in the lower uterine segment.    PROCEDURE IN DETAIL:  After informed consent was obtained, the patient was   taken to the operating room where spinal anesthesia was found to be adequate.    She was prepped and draped in the usual sterile fashion and a Pfannenstiel   skin incision made, carried down to the underlying layer of fascia with the   knife.  The fascia was then nicked in the midline, elevated and the fascial   incision extended laterally on both sides with Thomas scissors.  Rectus muscles   were then dissected off the posterior aspect of the fascia both superiorly and   inferiorly.  They were  in the midline.  Peritoneum was entered   bluntly and the peritoneal incision then extended superiorly and inferiorly   with good visualization of bowel and bladder.  The uterus was incised in a   low-transverse fashion and extended bluntly.  The female infant was delivered   through the incision with vacuum assistance.  There was a nuchal cord x1.    There was 30 seconds of delayed cord clamping on the field.  The cord was then   clamped x2 and cut.  The infant was handed to awaiting nursing  staff.  The   placenta then delivered manually.  The uterus was then cleared of all clots   and debris with a moist lap sponge, then closed with #1 chromic in a running   locked fashion.  A second layer of the same suture was then used to obtain   hemostasis.  Hemostasis was found to be adequate at the uterine incision.  At   that point, the abdomen was copiously irrigated.  The peritoneum was then   closed using a 0 Vicryl in a running fashion.  The muscles were reapproximated   with a Vicryl stitch and the fascia closed with 0 Vicryl in a running   fashion.  The subcutaneous layers were checked for hemostasis, reapproximated   with a 2-0 plain and the skin was closed in subcuticular fashion with a 4-0   Vicryl.  The sponge, lap, and needle counts were all correct x2 and the   patient was taken to the recovery room in good condition.       ____________________________________     MD FOZIA Regan / MARILEE    DD:  01/07/2019 05:02:55  DT:  01/07/2019 05:24:48    D#:  [id number]  Job#:  [id number]

## 2019-01-08 PROCEDURE — A9270 NON-COVERED ITEM OR SERVICE: HCPCS | Performed by: OBSTETRICS & GYNECOLOGY

## 2019-01-08 PROCEDURE — 700111 HCHG RX REV CODE 636 W/ 250 OVERRIDE (IP): Performed by: OBSTETRICS & GYNECOLOGY

## 2019-01-08 PROCEDURE — 700102 HCHG RX REV CODE 250 W/ 637 OVERRIDE(OP): Performed by: OBSTETRICS & GYNECOLOGY

## 2019-01-08 PROCEDURE — A9270 NON-COVERED ITEM OR SERVICE: HCPCS | Performed by: ANESTHESIOLOGY

## 2019-01-08 PROCEDURE — 700112 HCHG RX REV CODE 229: Performed by: OBSTETRICS & GYNECOLOGY

## 2019-01-08 PROCEDURE — 700111 HCHG RX REV CODE 636 W/ 250 OVERRIDE (IP): Performed by: ANESTHESIOLOGY

## 2019-01-08 PROCEDURE — 700102 HCHG RX REV CODE 250 W/ 637 OVERRIDE(OP): Performed by: ANESTHESIOLOGY

## 2019-01-08 PROCEDURE — 770002 HCHG ROOM/CARE - OB PRIVATE (112)

## 2019-01-08 RX ORDER — FERROUS SULFATE 325(65) MG
325 TABLET ORAL
Status: DISCONTINUED | OUTPATIENT
Start: 2019-01-08 | End: 2019-01-10 | Stop reason: HOSPADM

## 2019-01-08 RX ADMIN — METHIMAZOLE 10 MG: 10 TABLET ORAL at 11:56

## 2019-01-08 RX ADMIN — METHIMAZOLE 10 MG: 10 TABLET ORAL at 18:01

## 2019-01-08 RX ADMIN — ACETAMINOPHEN 1000 MG: 500 TABLET ORAL at 02:22

## 2019-01-08 RX ADMIN — OXYCODONE HYDROCHLORIDE 5 MG: 5 TABLET ORAL at 07:54

## 2019-01-08 RX ADMIN — IBUPROFEN 800 MG: 800 TABLET, FILM COATED ORAL at 08:29

## 2019-01-08 RX ADMIN — Medication 325 MG: at 07:44

## 2019-01-08 RX ADMIN — OXYCODONE HYDROCHLORIDE 10 MG: 10 TABLET ORAL at 15:55

## 2019-01-08 RX ADMIN — PROPRANOLOL HYDROCHLORIDE 10 MG: 10 TABLET ORAL at 14:09

## 2019-01-08 RX ADMIN — PROPRANOLOL HYDROCHLORIDE 10 MG: 10 TABLET ORAL at 22:21

## 2019-01-08 RX ADMIN — PREDNISONE 4 MG: 1 TABLET ORAL at 05:46

## 2019-01-08 RX ADMIN — OXYCODONE HYDROCHLORIDE 10 MG: 10 TABLET ORAL at 20:14

## 2019-01-08 RX ADMIN — OXYCODONE HYDROCHLORIDE 5 MG: 5 TABLET ORAL at 11:56

## 2019-01-08 RX ADMIN — IBUPROFEN 800 MG: 800 TABLET, FILM COATED ORAL at 16:16

## 2019-01-08 RX ADMIN — METHIMAZOLE 10 MG: 10 TABLET ORAL at 05:47

## 2019-01-08 RX ADMIN — KETOROLAC TROMETHAMINE 30 MG: 30 INJECTION, SOLUTION INTRAMUSCULAR at 02:22

## 2019-01-08 RX ADMIN — DOCUSATE SODIUM 100 MG: 100 CAPSULE, LIQUID FILLED ORAL at 02:22

## 2019-01-08 ASSESSMENT — PAIN SCALES - GENERAL
PAINLEVEL_OUTOF10: 7
PAINLEVEL_OUTOF10: 2
PAINLEVEL_OUTOF10: 7
PAINLEVEL_OUTOF10: 4
PAINLEVEL_OUTOF10: 2
PAINLEVEL_OUTOF10: 5
PAINLEVEL_OUTOF10: 3
PAINLEVEL_OUTOF10: 3

## 2019-01-08 NOTE — PROGRESS NOTES
"Follow up:    Baby now 29 hrs old. Born at 36.4 wks. Parents 1st baby born at 37 wks but nursed better than this baby. Mother has a hx of hypothyroidism, low milk supply. In speaking with mother she told me that she always feels nauseated and sad when she breast feeds. Gave her info on D-Luz (Dysphoric Milk Ejection Reflex). She read it and stated that she feels many of the symptoms.     Went over her feeding plan established earlier. Helped her get baby latched but she tends to go for the cradle hold. Showed her cross-cradle and how she will have more control over latch. She has \"rheumatoid arthritis\" (per Mom) and has trouble holding her breast for very long. Placed a pillow under that breast with good results.    Worked with both parents on paced feeding and burping and helping them understand the differences between term and late  babies. Will need follow up and a home HG rental pump before discharge.    "

## 2019-01-08 NOTE — LACTATION NOTE
"This note was copied from a baby's chart.         [address] [facility] [place] [address]       Isabell Serrano   MRN: [id number], : 1975 , Sex: F   Admit: 2019, D/C:           Cele Greer R.N. Lactation Consultant Signed   Progress Notes Date of Service: 2019  9:41 AM         []Hide copied text  Follow up:    Baby now 29 hrs old. Born at 36.4 wks. Parents 1st baby born at 37 wks but nursed better than this baby. Mother has a hx of hypothyroidism, low milk supply. In speaking with mother she told me that she always feels nauseated and sad when she breast feeds. Gave her info on D-Luz (Dysphoric Milk Ejection Reflex). She read it and stated that she feels many of the symptoms.      Went over her feeding plan established earlier. Helped her get baby latched but she tends to go for the cradle hold. Showed her cross-cradle and how she will have more control over latch. She has \"rheumatoid arthritis\" (per Mom) and has trouble holding her breast for very long. Placed a pillow under that breast with good results.     Worked with both parents on paced feeding and burping and helping them understand the differences between term and late  babies. Will need follow up and a home HG rental pump before discharge.             "

## 2019-01-08 NOTE — CARE PLAN
Problem: Altered physiologic condition related to postoperative  delivery  Goal: Patient physiologically stable as evidenced by normal lochia, palpable uterine involution and vital signs within normal limits  Outcome: PROGRESSING AS EXPECTED  Fundus firm at umbilicus with light lochia.    Problem: Potential for postpartum infection related to surgical incision, compromised uterine condition, urinary tract or respiratory compromise  Goal: Patient will be afebrile and free from signs and symptoms of infection  Outcome: PROGRESSING AS EXPECTED  Afebrile with incision dressing clean,dry, and intact.

## 2019-01-08 NOTE — CONSULTS
"Met with MOB for an initial lactation visit.  MOB delivered her third child today at 0424 at 36.4 weeks gestation.  Infant is approximately 17 hours old and is the NBN underneath the warmer following a bath.  Infant was started on a feeding plan due to being cold x 2.  Also, infant's weight loss at 16 hours old was 5.5% which exceeds acceptable level of 3% for LPIs in the first 24 hours.  Infant voiding and stooling within defined limits.  MOB stated breast fed her second child successfully for 9 months and denied history of previous low milk supply.  MOB stated has hypothyroidism, but also stated that her TSH levels are within defined limits.    Reviewed feeding plan for infant which is as follows:  1.  Put infant to the breast first at every feeding.  MOB instructed not to attempt to latch infant for greater than 5-10 minutes.  2.  Supplement infant with DBM and/or EBM according to Bhandari Ochoa supplementation guidelines.  3.  Pump to protect milk supply.    Pump settings reviewed with MOB and are: 80 CPM down to 60 after 2 minutes/suction set to comfort at 30%/pumps for 15 minutes. MOB denied pain with pumping.  MOB informed it may take a few pumping sessions before she receives any colostrum.    MOB informed of the outpatient lactation assistance available to her through the [facility].  MOB provided with written information on hospital grade breast pump rentals available to her through the [facility].  MOB invited to attend Breastfeeding [place].    Provided MOB with \"Getting To Know Your Baby\" pamphlet and the [facility] LPI hand-out which was discussed with MOB.    Informed MOB on what to expect with breastfeeding and the LPI.  MOB made aware that although infant latches onto the breast without difficulty, she may not be transferring milk efficiently due under developed jaw muscles.    MOB verbalized understanding of all information provided to her and denied having any further questions at this " time.  Encouraged MOB to call for lactation assistance as needed.

## 2019-01-08 NOTE — CARE PLAN
Problem: Alteration in comfort related to surgical incision and/or after birth pains  Goal: Patient verbalizes acceptable pain level  Outcome: PROGRESSING AS EXPECTED  Verbalizes acceptable pain relief with pain medication being given as ordered.

## 2019-01-08 NOTE — PROGRESS NOTES
"OB Post Operative Note    Pain controlled. Ambulating. Working on breast feeding. Positive flatus. Voiding. Eating well    Vitals  BP (!) 88/47   Pulse 82   Temp 36.2 °C (97.2 °F) (Temporal)   Resp 17   Ht 1.778 m (5' 10\")   Wt 77.1 kg (170 lb)   SpO2 95%   Breastfeeding? Yes   BMI 24.39 kg/m²     Gen: NAD, resting comfortably  GI: soft, non tender to palpation, incision c/d/i with steri strips in place  :fundus firm and below umbilicus  Ext: no edema      Recent Labs      01/07/19   0056  01/07/19   1251   WBC  10.7  12.9*   RBC  5.18  4.18*   HEMOGLOBIN  12.2  10.0*   HEMATOCRIT  38.3  31.7*   MCV  73.9*  75.8*   MCH  23.6*  23.9*   RDW  47.1  49.2   PLATELETCT  295  278   MPV  10.8  11.3   NEUTSPOLYS  88.50*   --    LYMPHOCYTES  5.40*   --    MONOCYTES  4.70   --    EOSINOPHILS  0.70   --    BASOPHILS  0.20   --        Assessment:  POD day # 1  Anemia of acute blood loss  Hyperthyroidism  RA    Plan:  Routine post operative care  Encouraged to work with lactation  Start ferrous sulfate  Continue methimazole and propranolol with hold parameters  Continue prednisone  Discharge home on POD# 3-4    Leopoldo Wiseman M.D.        "

## 2019-01-08 NOTE — PROGRESS NOTES
Assessment complete. Fundus firm, lochia light. VSS. Tolerating diet. Voiding without difficulty. Incision with steri strips, MARLENA, CDI. Pt states passing gas. Pain controlled with prn medications per mar. Will offer pain medications as they become available. FOB at bedside, bonding with pt/baby. POC discussed with pt. Encouraged to call with needs. Call light in place.

## 2019-01-09 PROCEDURE — 700102 HCHG RX REV CODE 250 W/ 637 OVERRIDE(OP): Performed by: OBSTETRICS & GYNECOLOGY

## 2019-01-09 PROCEDURE — 770002 HCHG ROOM/CARE - OB PRIVATE (112)

## 2019-01-09 PROCEDURE — 700112 HCHG RX REV CODE 229: Performed by: OBSTETRICS & GYNECOLOGY

## 2019-01-09 PROCEDURE — 700111 HCHG RX REV CODE 636 W/ 250 OVERRIDE (IP): Performed by: OBSTETRICS & GYNECOLOGY

## 2019-01-09 PROCEDURE — A9270 NON-COVERED ITEM OR SERVICE: HCPCS | Performed by: OBSTETRICS & GYNECOLOGY

## 2019-01-09 RX ADMIN — OXYCODONE HYDROCHLORIDE 10 MG: 10 TABLET ORAL at 12:37

## 2019-01-09 RX ADMIN — PROPRANOLOL HYDROCHLORIDE 10 MG: 10 TABLET ORAL at 06:43

## 2019-01-09 RX ADMIN — METHIMAZOLE 10 MG: 10 TABLET ORAL at 06:40

## 2019-01-09 RX ADMIN — PREDNISONE 4 MG: 1 TABLET ORAL at 06:40

## 2019-01-09 RX ADMIN — PROPRANOLOL HYDROCHLORIDE 10 MG: 10 TABLET ORAL at 14:26

## 2019-01-09 RX ADMIN — Medication 325 MG: at 08:40

## 2019-01-09 RX ADMIN — OXYCODONE HYDROCHLORIDE 10 MG: 10 TABLET ORAL at 00:16

## 2019-01-09 RX ADMIN — IBUPROFEN 800 MG: 800 TABLET, FILM COATED ORAL at 18:04

## 2019-01-09 RX ADMIN — IBUPROFEN 800 MG: 800 TABLET, FILM COATED ORAL at 08:40

## 2019-01-09 RX ADMIN — OXYCODONE HYDROCHLORIDE 10 MG: 10 TABLET ORAL at 08:40

## 2019-01-09 RX ADMIN — OXYCODONE HYDROCHLORIDE 10 MG: 10 TABLET ORAL at 18:04

## 2019-01-09 RX ADMIN — OXYCODONE HYDROCHLORIDE 10 MG: 10 TABLET ORAL at 04:38

## 2019-01-09 RX ADMIN — METHIMAZOLE 10 MG: 10 TABLET ORAL at 18:04

## 2019-01-09 RX ADMIN — OXYCODONE HYDROCHLORIDE 10 MG: 10 TABLET ORAL at 21:59

## 2019-01-09 RX ADMIN — METHIMAZOLE 10 MG: 10 TABLET ORAL at 12:37

## 2019-01-09 RX ADMIN — IBUPROFEN 800 MG: 800 TABLET, FILM COATED ORAL at 00:16

## 2019-01-09 RX ADMIN — PROPRANOLOL HYDROCHLORIDE 10 MG: 10 TABLET ORAL at 21:58

## 2019-01-09 RX ADMIN — DOCUSATE SODIUM 100 MG: 100 CAPSULE, LIQUID FILLED ORAL at 04:38

## 2019-01-09 ASSESSMENT — PAIN SCALES - GENERAL
PAINLEVEL_OUTOF10: 6
PAINLEVEL_OUTOF10: 1
PAINLEVEL_OUTOF10: 1
PAINLEVEL_OUTOF10: 4
PAINLEVEL_OUTOF10: 8
PAINLEVEL_OUTOF10: 6

## 2019-01-09 NOTE — LACTATION NOTE
"Met with MOB for a lactation follow up visit.  Latch assistance offered, but MOB declined.  MOB stated infant \"woke up this morning and opened her mouth wide\" and has been nursing at the breast more consistently today.  MOB again reminded of what to expect when breastfeeding the LPI.  MOB denied pain and tissue damage to nipples and areolas with latch.    MOB made aware that breastfeeding plan will continue post discharge until infant is able to suckle consistently at the breast and effectively remove milk.  MOB provided with weight based supplementation guidelines to follow along with instructions on use.  Bhandari Ochoa supplementation guidelines were discontinued due to infant's increased weight loss and infant's gestational age at birth.  MOB also stated has a personal breast pump (Cuturia) for home use.  MOB informed of the option to rent a hospital grade breast pump through the [facility].  MOB educated on the difference between a hospital grade breast pump and a personal breast pump in establishing and maintaining her milk supply.    MOB also aware of the outpatient lactation assistance available to her through the [facility].  MOB stated does not have WIC and is not interested in applying for the program.  MOB stated has a  who she stated will be providing her with latch assistance once she is home from the hospital.    MOB verbalized understanding and denied having any further questions at this time.  Encouraged MOB to call for lactation assistance as needed.  "

## 2019-01-09 NOTE — PROGRESS NOTES
1900 Received bedside report from ELOISA Ramirez. Discussed plan of care. Patient requests pain medication as it becomes available. All needs met at this time.

## 2019-01-09 NOTE — PROGRESS NOTES
"OB Post Operative Note    Pain controlled. Ambulating. Breast feeding going much better. Positive flatus, neg BM. Voiding. Eating well    Vitals  /65   Pulse 77   Temp 36.7 °C (98 °F) (Temporal)   Resp 16   Ht 1.778 m (5' 10\")   Wt 77.1 kg (170 lb)   SpO2 98%   Breastfeeding? Yes   BMI 24.39 kg/m²     Gen: NAD, resting comfortably  GI: soft, non tender to palpation, incision c/d/i with steri strips in place  :fundus firm and U-1  Ext: no edema    Assessment:  POD day # 2  Anemia of acute blood loss  Hyperthyroidism  RA    Plan:  Routine post operative care  Continues to work with lactation  Ferrous sulfate  Continue methimazole and propranolol with hold parameters  Continue prednisone  Likely discharge home on POD# 3    Marshall Castillo M.D.        "

## 2019-01-09 NOTE — CARE PLAN
Problem: Alteration in comfort related to surgical incision and/or after birth pains  Goal: Patient is able to ambulate, care for self and infant with acceptable pain level  Outcome: PROGRESSING AS EXPECTED  Pt states that pain is well controlled with current pain medications.    Problem: Potential knowledge deficit related to lack of understanding of self and  care  Goal: Patient will demonstrate ability to care for self and infant  Outcome: PROGRESSING AS EXPECTED  Pt up in room caring for self and infant well.

## 2019-01-10 VITALS
DIASTOLIC BLOOD PRESSURE: 60 MMHG | OXYGEN SATURATION: 97 % | SYSTOLIC BLOOD PRESSURE: 113 MMHG | BODY MASS INDEX: 24.34 KG/M2 | RESPIRATION RATE: 18 BRPM | WEIGHT: 170 LBS | HEART RATE: 70 BPM | TEMPERATURE: 98 F | HEIGHT: 70 IN

## 2019-01-10 PROBLEM — D62 ACUTE BLOOD LOSS AS CAUSE OF POSTOPERATIVE ANEMIA: Status: ACTIVE | Noted: 2019-01-10

## 2019-01-10 PROBLEM — G89.18 ACUTE POST-OPERATIVE PAIN: Status: ACTIVE | Noted: 2019-01-10

## 2019-01-10 PROBLEM — Z98.891 S/P CESAREAN SECTION: Status: ACTIVE | Noted: 2019-01-10

## 2019-01-10 PROCEDURE — A9270 NON-COVERED ITEM OR SERVICE: HCPCS | Performed by: OBSTETRICS & GYNECOLOGY

## 2019-01-10 PROCEDURE — 700102 HCHG RX REV CODE 250 W/ 637 OVERRIDE(OP): Performed by: OBSTETRICS & GYNECOLOGY

## 2019-01-10 PROCEDURE — 700111 HCHG RX REV CODE 636 W/ 250 OVERRIDE (IP): Performed by: OBSTETRICS & GYNECOLOGY

## 2019-01-10 PROCEDURE — 700112 HCHG RX REV CODE 229: Performed by: OBSTETRICS & GYNECOLOGY

## 2019-01-10 RX ORDER — IBUPROFEN 800 MG/1
800 TABLET ORAL EVERY 8 HOURS PRN
Qty: 60 TAB | Refills: 0 | Status: SHIPPED | OUTPATIENT
Start: 2019-01-10 | End: 2019-12-13

## 2019-01-10 RX ORDER — FERROUS SULFATE 325(65) MG
325 TABLET ORAL
Qty: 60 TAB | Refills: 0 | Status: SHIPPED | OUTPATIENT
Start: 2019-01-10 | End: 2019-12-13

## 2019-01-10 RX ORDER — OXYCODONE HYDROCHLORIDE AND ACETAMINOPHEN 5; 325 MG/1; MG/1
1-2 TABLET ORAL EVERY 6 HOURS PRN
Qty: 28 TAB | Refills: 0 | Status: SHIPPED | OUTPATIENT
Start: 2019-01-10 | End: 2019-01-17

## 2019-01-10 RX ADMIN — Medication 325 MG: at 07:21

## 2019-01-10 RX ADMIN — DOCUSATE SODIUM 100 MG: 100 CAPSULE, LIQUID FILLED ORAL at 03:05

## 2019-01-10 RX ADMIN — PREDNISONE 4 MG: 1 TABLET ORAL at 06:19

## 2019-01-10 RX ADMIN — PROPRANOLOL HYDROCHLORIDE 10 MG: 10 TABLET ORAL at 06:19

## 2019-01-10 RX ADMIN — IBUPROFEN 800 MG: 800 TABLET, FILM COATED ORAL at 03:04

## 2019-01-10 RX ADMIN — METHIMAZOLE 10 MG: 10 TABLET ORAL at 11:12

## 2019-01-10 RX ADMIN — METHIMAZOLE 10 MG: 10 TABLET ORAL at 06:19

## 2019-01-10 RX ADMIN — IBUPROFEN 800 MG: 800 TABLET, FILM COATED ORAL at 11:12

## 2019-01-10 RX ADMIN — OXYCODONE HYDROCHLORIDE 10 MG: 10 TABLET ORAL at 03:05

## 2019-01-10 ASSESSMENT — PAIN SCALES - GENERAL
PAINLEVEL_OUTOF10: 2
PAINLEVEL_OUTOF10: 6
PAINLEVEL_OUTOF10: 1
PAINLEVEL_OUTOF10: 4

## 2019-01-10 NOTE — DISCHARGE INSTRUCTIONS
POSTPARTUM DISCHARGE INSTRUCTIONS FOR MOM    YOB: 1975   Age: 43 y.o.               Admit Date: 2019     Discharge Date: 1/10/2019  Attending Doctor:  Leopoldo Wiseman M.D.                  Allergies:  Patient has no known allergies.    Discharged to home by car. Discharged via wheelchair, hospital escort: Yes.  Special equipment needed: Not Applicable  Belongings with: Personal  Be sure to schedule a follow-up appointment with your primary care doctor or any specialists as instructed.     Discharge Plan:   Diet Plan: Discussed  Activity Level: Discussed  Confirmed Follow up Appointment: Patient to Call and Schedule Appointment  Confirmed Symptoms Management: Discussed  Medication Reconciliation Updated: Yes  Influenza Vaccine Indication: Not indicated: Previously immunized this influenza season and > 8 years of age    REASONS TO CALL YOUR OBSTETRICIAN:  1.   Persistent fever or shaking chills (Temperature higher than 100.4)  2.   Heavy bleeding (soaking more than 1 pad per hour); Passing clots  3.   Foul odor from vagina  4.   Mastitis (Breast infection; breast pain, chills, fever, redness)  5.   Urinary pain, burning or frequency  6.   Episiotomy infection  7.   Abdominal incision infection  8.   Severe depression longer than 24 hours    HAND WASHING  · Prior to handling the baby.  · Before breastfeeding or bottle feeding baby.  · After using the bathroom or changing the baby's diaper.    WOUND CARE  Ask your physician for additional care instructions.  In general:    ·  Incision:      · Keep clean and dry.    · Do NOT lift anything heavier than your baby for up to 6 weeks.    · There should not be any opening or pus.      VAGINAL CARE  · Nothing inside vagina for 6 weeks: no sexual intercourse, tampons or douching.  · Bleeding may continue for 2-4 weeks.  Amount may vary.    · Call your physician for heavy bleeding which means soaking more than 1 pad per hour    BIRTH CONTROL  · It is  "possible to become pregnant at any time after delivery and while breastfeeding.  · Plan to discuss a method of birth control with your physician at your follow up visit. visit.    DIET AND ELIMINATION  · Eating more fiber (bran cereal, fruits, and vegetables) and drinking plenty of fluids will help to avoid constipation.  · Urinary frequency after childbirth is normal.    POSTPARTUM BLUES  During the first few days after birth, you may experience a sense of the \"blues\" which may include impatience, irritability or even crying.  These feeling come and go quickly.  However, as many as 1 in 10 women experience emotional symptoms known as postpartum depression.    Postpartum depression:  May start as early as the second or third day after delivery or take several weeks or months to develop.  Symptoms of \"blues\" are present, but are more intense:  Crying spells; loss of appetite; feelings of hopelessness or loss of control; fear of touching the baby; over concern or no concern at all about the baby; little or no concern about your own appearance/caring for yourself; and/or inability to sleep or excessive sleeping.  Contact your physician if you are experiencing any of these symptoms.    Crisis Hotline:  · [place] Crisis Hotline:  6-247-GSYQZBS  Or 1-291.205.2136  · [place] Crisis Hotline:  1-915.332.5501  Or 679-072-0444    PREVENTING SHAKEN BABY:  If you are angry or stressed, PUT THE BABY IN THE CRIB, step away, take some deep breaths, and wait until you are calm to care for the baby.  DO NOT SHAKE THE BABY.  You are not alone, call a supporter for help.    · [facility] Center 24/7 crisis line 314-453-2203 or 1-601.210.2052  · You can also text them, text \"ANSWER\" to 076728    QUIT SMOKING/TOBACCO USE:  I understand the use of any tobacco products increases my chance of suffering from future heart disease and could cause other illnesses which may shorten my life. Quitting the use of tobacco products is the single most " important thing I can do to improve my health. For further information on smoking / tobacco cessation call a Toll Free Quit Line at 1-500.949.1661 (*[employer]) or 1-811.128.6361 (American Lung Association) or you can access the web based program at www.lungusa.org.    · [place] Tobacco Users Help Line:  (696) 995-9102       Toll Free: 1-836.213.5709  · Quit Tobacco Program [facility] (647)750-5414    DEPRESSION / SUICIDE RISK:  As you are discharged from this [facility], it is important to learn how to keep safe from harming yourself.    Recognize the warning signs:  · Abrupt changes in personality, positive or negative- including increase in energy   · Giving away possessions  · Change in eating patterns- significant weight changes-  positive or negative  · Change in sleeping patterns- unable to sleep or sleeping all the time   · Unwillingness or inability to communicate  · Depression  · Unusual sadness, discouragement and loneliness  · Talk of wanting to die  · Neglect of personal appearance   · Rebelliousness- reckless behavior  · Withdrawal from people/activities they love  · Confusion- inability to concentrate     If you or a loved one observes any of these behaviors or has concerns about self-harm, here's what you can do:  · Talk about it- your feelings and reasons for harming yourself  · Remove any means that you might use to hurt yourself (examples: pills, rope, extension cords, firearm)  · Get professional help from the community (Mental Health, Substance Abuse, psychological counseling)  · Do not be alone:Call your Safe Contact- someone whom you trust who will be there for you.  · Call your local CRISIS HOTLINE 929-9947 or 418-039-2276  · Call your local Children's Mobile Crisis Response Team [place] (428) 588-8351 or www.[facility]  · Call the toll free National Suicide Prevention Hotlines   · National Suicide Prevention Lifeline 725-661-LWYW  (1431)  · [facility] 800-SUICIDE (820-1758)    DISCHARGE SURVEY:  Thank you for choosing [facility].  We hope we provided you with very good care.  You may be receiving a survey in the mail.  Please fill it out.  Your opinion is valuable to us.    ADDITIONAL EDUCATIONAL MATERIALS GIVEN TO PATIENT:        My signature on this form indicates that:  1.  I have reviewed and understand the above information  2.  My questions regarding this information have been answered to my satisfaction.  3.  I have formulated a plan with my discharge nurse to obtain my prescribed medication for home.

## 2019-01-10 NOTE — PROGRESS NOTES
1900 Received bedside report from ELOISA Edwards. Discussed plan of care. Patient requests pain medication as it becomes available. All needs met at this time.

## 2019-01-10 NOTE — DISCHARGE SUMMARY
"Discharge Summary    Admission Date: 19    Discharge Date: 1/10/19    Diagnosis:  Active Problems:    S/P  section    Acute post-operative pain    Acute blood loss as cause of postoperative anemia      Subjective: Pain controlled. Normal lochia. Eating, voiding and ambulating without difficulty. Breast feeding has improved. Ready for discharge home    BP (!) 99/61   Pulse 72   Temp 37.1 °C (98.8 °F) (Temporal)   Resp 16   Ht 1.778 m (5' 10\")   Wt 77.1 kg (170 lb)   SpO2 97%   Breastfeeding? Yes   BMI 24.39 kg/m²     GEN: NAD  GI:soft, NT, ND, incision c/d/i without erythema or induration, steri strips in place  :fundus firm and below umbilicus  EXT:no edema    Hospital Course:Isabell Serrano is a 44 yo  who presented at 36w4d with SROM. She had a history of placenta previa this pregnancy.  care complicated otherwise with hyperthyroidism and rheumatoid arthritis. She was admitted and underwent PLTCS with delivery of a viable male infant with APGARS 8/9. EBL 900cc. Post operative course complicated by asymptomatic anemia of acute blood loss treated with iron. She was meeting all post operative goals and stable for discharge on POD#3.     Discharge Instructions   1. Diet : general  2. Activity:   No heavy lifting, pushing, pulling more than 10 lbs for 6 weeks  Pelvic rest for 6 weeks  No driving while on narcotics.   Keep incision clean and dry. Wash with soap and water       Isabell Serrano Alex   Home Medication Instructions JENARO:[id number]    Printed on:01/10/19 0643   Medication Information                      ferrous sulfate 325 (65 Fe) MG tablet  Take 1 Tab by mouth every morning with breakfast.             ibuprofen (MOTRIN) 800 MG Tab  Take 1 Tab by mouth every 8 hours as needed (For cramping after delivery; do not give if patient is receiving ketorolac (Toradol)).             methimazole (TAPAZOLE) 5 MG Tab  Take 3 Tabs by mouth every 6 hours.             methylPREDNISolone " (MEDROL) 4 MG Tab  Take 1 Tab by mouth every day. TAKE 1 TAB BY MOUTH EVERY DAY.             oxyCODONE-acetaminophen (PERCOCET) 5-325 MG Tab  Take 1-2 Tabs by mouth every 6 hours as needed for up to 7 days.             potassium chloride SA (KDUR) 20 MEQ Tab CR  Take 1 Tab by mouth 2 Times a Day.             predniSONE (DELTASONE) 10 MG Tab  Take for one day             propranolol (INDERAL) 20 MG Tab  Take 1 Tab by mouth every 6 hours.                 Follow up: 2 weeks    Complications:none    Leopoldo Wiseman M.D.

## 2019-01-11 NOTE — LACTATION NOTE
Mother reports breasts are filling with milk, reviewed use of warmth and gentle breast massage to facilitate milk flow, educated to turn pump speed down to 60 CPM after 1-2 minutes, planning to rent hospital grade pump for home use. Information on milk study provided. Declines latch assistance prior to discharge. Reviewed weight based feeding volumes and paced feeding technique using chin support as needed. Will follow LPI feeding plan at home including breast feeding first for 10-15 minutes and then pumping and supplementation with EBM/DBM. Mother denies questions/concerns.

## 2019-02-09 ENCOUNTER — HOSPITAL ENCOUNTER (EMERGENCY)
Facility: MEDICAL CENTER | Age: 44
End: 2019-02-09
Attending: EMERGENCY MEDICINE
Payer: MEDICAID

## 2019-02-09 VITALS
HEIGHT: 70 IN | OXYGEN SATURATION: 97 % | DIASTOLIC BLOOD PRESSURE: 86 MMHG | BODY MASS INDEX: 23.99 KG/M2 | TEMPERATURE: 98.2 F | RESPIRATION RATE: 14 BRPM | WEIGHT: 167.55 LBS | HEART RATE: 90 BPM | SYSTOLIC BLOOD PRESSURE: 149 MMHG

## 2019-02-09 DIAGNOSIS — Z76.0 MEDICATION REFILL: ICD-10-CM

## 2019-02-09 PROCEDURE — 700102 HCHG RX REV CODE 250 W/ 637 OVERRIDE(OP): Performed by: EMERGENCY MEDICINE

## 2019-02-09 PROCEDURE — 99283 EMERGENCY DEPT VISIT LOW MDM: CPT

## 2019-02-09 RX ORDER — METHYLPREDNISOLONE 4 MG/1
4 TABLET ORAL DAILY
Qty: 10 TAB | Refills: 0 | Status: SHIPPED | OUTPATIENT
Start: 2019-02-09 | End: 2022-05-20

## 2019-02-09 RX ORDER — METHYLPREDNISOLONE 4 MG/1
4 TABLET ORAL ONCE
Status: COMPLETED | OUTPATIENT
Start: 2019-02-09 | End: 2019-02-09

## 2019-02-09 RX ADMIN — METHYLPREDNISOLONE 4 MG: 4 TABLET ORAL at 10:11

## 2019-02-09 NOTE — ED NOTES
Pt reports she is out of methylprednisolone and needs a script to last her until Friday when she can see her primary.

## 2019-02-09 NOTE — ED PROVIDER NOTES
ED Provider Note    Scribed for James Hines M.D. by Miguel Ángel Sarmiento. 2/9/2019, 9:06 AM.    Primary care provider: Jenniffer Bermudez M.D.  Means of arrival: Walk In  History obtained from: Patient  History limited by: None    CHIEF COMPLAINT  Chief Complaint   Patient presents with   • Medication Refill     Pt reports to need med refill of methylprednisolone. pt reports to have RA and has been out for 2-3 wks.       HPI  Isabell Serrano is a 44 y.o. Female with a history of rheumatoid arthritis who presents to the Emergency Department for a medication refill as she has been out of her medication for 2-3 weeks and is having a flare up of her arthritis. She states that she regularly takes methylprednisolone as prescribed by her general practitioner but she has not had an appointment with him recently. Per patient, she will be seeing Dr. King, the rheumatologist, within the next week for a possible medication alteration. She is negative for fever.     REVIEW OF SYSTEMS  Pertinent positives include rheumatoid arthritis flare up.   Pertinent negatives include no fever.       PAST MEDICAL HISTORY   has a past medical history of Arthritis; Arthritis, rheumatoid (HCC); and RA (rheumatoid arthritis) ([facility]) (11/5/2013).    SURGICAL HISTORY   has a past surgical history that includes mammoplasty augmentation (7/12/2011) and primary c section (1/7/2019).    SOCIAL HISTORY  Social History   Substance Use Topics   • Smoking status: Never Smoker   • Smokeless tobacco: Never Used   • Alcohol use 0.5 oz/week     1 Standard drinks or equivalent per week      Comment: Once a month      History   Drug Use No       FAMILY HISTORY  Family History   Problem Relation Age of Onset   • Cancer Father         throat/prostate   • Rheumatologic Disease Paternal Aunt    • Other Paternal Aunt         MS   • Rheumatologic Disease Paternal Aunt    • Arthritis Paternal Aunt         RA       CURRENT MEDICATIONS  Home Medications   "  **Home medications have not yet been reviewed for this encounter**         ALLERGIES  No Known Allergies    PHYSICAL EXAM  VITAL SIGNS: /86   Pulse 90   Temp 36.8 °C (98.2 °F) (Temporal)   Resp 14   Ht 1.778 m (5' 10\")   Wt 76 kg (167 lb 8.8 oz)   SpO2 97%   BMI 24.04 kg/m²     Nursing note and vitals reviewed.  Constitutional: No distress.   HENT: Head is atraumatic. Oropharynx is moist.   Eyes: Conjunctivae are normal. Pupils are equal, round, and reactive to light.   Cardiovascular: Normal peripheral perfusion  Respiratory: No respiratory distress.   Musculoskeletal: Normal range of motion. Hand findings consistent with chronic rheumatoid arthritis.   Neurological: Alert. No focal deficits noted.    Skin: No rash.   Psych: Appropriate for clinical situation     COURSE & MEDICAL DECISION MAKING  Nursing notes, VS, PMSFHx reviewed in chart.    9:06 AM - Patient seen and examined at bedside. Patient will be treated with Methylprednisolone 4 mg. I informed the patient that I will write her a prescription to last until she is able to meet with her rheumatologist. I also told her that we will provide her with a dose of methylprednisolone here in the ED. She will be stable for discharge after medication administration. She agreed to the plan of care including discharge.    DISPOSITION:  Patient will be discharged home in stable condition.    FOLLOW UP:  [facility] ED if symptoms worsen and follow-up with your PCP or Rheumatologist.    OUTPATIENT MEDICATIONS:  New Prescriptions    METHYLPREDNISOLONE (MEDROL) 4 MG TAB    Take 1 Tab by mouth every day.       The patient was discharged home with an information sheet on rheumatoid arthritis and told to return immediately for any signs or symptoms listed.  The patient agreed to the discharge precautions and follow-up plan which is documented in EPIC.    FINAL IMPRESSION  1. Medication refill          IMiguel Ángel (Mariibjosé manuel), am scribing for, and in the " presence of, James Hiens M.D..    Electronically signed by: Miguel Ángel Sarmiento (Scribe), 2/9/2019    I, James Hines M.D. personally performed the services described in this documentation, as scribed by Miguel Ángel Sarmiento in my presence, and it is both accurate and complete. E.     The note accurately reflects work and decisions made by me.  James Hines  2/9/2019  2:54 PM

## 2019-02-09 NOTE — ED TRIAGE NOTES
Chief Complaint   Patient presents with   • Medication Refill     Pt reports to need med refill of methylprednisolone. pt reports to have RA and has been out for 2-3 wks.     Explained to pt triage process, made pt aware to tell this RN/staff of any changes/concerns, pt verbalized understanding of process and instructions given. Pt to ER lobby.

## 2019-12-13 DIAGNOSIS — Z01.812 PRE-OPERATIVE LABORATORY EXAMINATION: ICD-10-CM

## 2019-12-13 LAB
ANION GAP SERPL CALC-SCNC: 9 MMOL/L (ref 0–11.9)
BUN SERPL-MCNC: 13 MG/DL (ref 8–22)
CALCIUM SERPL-MCNC: 10.2 MG/DL (ref 8.5–10.5)
CHLORIDE SERPL-SCNC: 106 MMOL/L (ref 96–112)
CO2 SERPL-SCNC: 24 MMOL/L (ref 20–33)
CREAT SERPL-MCNC: 0.46 MG/DL (ref 0.5–1.4)
GLUCOSE SERPL-MCNC: 78 MG/DL (ref 65–99)
POTASSIUM SERPL-SCNC: 4.2 MMOL/L (ref 3.6–5.5)
SODIUM SERPL-SCNC: 139 MMOL/L (ref 135–145)

## 2019-12-13 PROCEDURE — 80048 BASIC METABOLIC PNL TOTAL CA: CPT

## 2019-12-13 PROCEDURE — 36415 COLL VENOUS BLD VENIPUNCTURE: CPT

## 2019-12-15 ENCOUNTER — ANESTHESIA EVENT (OUTPATIENT)
Dept: SURGERY | Facility: MEDICAL CENTER | Age: 44
End: 2019-12-15
Payer: COMMERCIAL

## 2019-12-16 ENCOUNTER — APPOINTMENT (OUTPATIENT)
Dept: RADIOLOGY | Facility: MEDICAL CENTER | Age: 44
End: 2019-12-16
Attending: ORTHOPAEDIC SURGERY
Payer: COMMERCIAL

## 2019-12-16 ENCOUNTER — HOSPITAL ENCOUNTER (OUTPATIENT)
Facility: MEDICAL CENTER | Age: 44
End: 2019-12-16
Attending: ORTHOPAEDIC SURGERY | Admitting: ORTHOPAEDIC SURGERY
Payer: COMMERCIAL

## 2019-12-16 ENCOUNTER — ANESTHESIA (OUTPATIENT)
Dept: SURGERY | Facility: MEDICAL CENTER | Age: 44
End: 2019-12-16
Payer: COMMERCIAL

## 2019-12-16 VITALS
WEIGHT: 143.3 LBS | DIASTOLIC BLOOD PRESSURE: 71 MMHG | BODY MASS INDEX: 20.52 KG/M2 | OXYGEN SATURATION: 99 % | HEART RATE: 98 BPM | TEMPERATURE: 98.9 F | SYSTOLIC BLOOD PRESSURE: 111 MMHG | HEIGHT: 70 IN | RESPIRATION RATE: 16 BRPM

## 2019-12-16 LAB — HCG UR QL: NEGATIVE

## 2019-12-16 PROCEDURE — 160041 HCHG SURGERY MINUTES - EA ADDL 1 MIN LEVEL 4: Performed by: ORTHOPAEDIC SURGERY

## 2019-12-16 PROCEDURE — 160002 HCHG RECOVERY MINUTES (STAT): Performed by: ORTHOPAEDIC SURGERY

## 2019-12-16 PROCEDURE — 160046 HCHG PACU - 1ST 60 MINS PHASE II: Performed by: ORTHOPAEDIC SURGERY

## 2019-12-16 PROCEDURE — 160025 RECOVERY II MINUTES (STATS): Performed by: ORTHOPAEDIC SURGERY

## 2019-12-16 PROCEDURE — A6223 GAUZE >16<=48 NO W/SAL W/O B: HCPCS | Performed by: ORTHOPAEDIC SURGERY

## 2019-12-16 PROCEDURE — 700111 HCHG RX REV CODE 636 W/ 250 OVERRIDE (IP)

## 2019-12-16 PROCEDURE — 502000 HCHG MISC OR IMPLANTS RC 0278: Performed by: ORTHOPAEDIC SURGERY

## 2019-12-16 PROCEDURE — 501838 HCHG SUTURE GENERAL: Performed by: ORTHOPAEDIC SURGERY

## 2019-12-16 PROCEDURE — 700101 HCHG RX REV CODE 250: Performed by: STUDENT IN AN ORGANIZED HEALTH CARE EDUCATION/TRAINING PROGRAM

## 2019-12-16 PROCEDURE — 500881 HCHG PACK, EXTREMITY: Performed by: ORTHOPAEDIC SURGERY

## 2019-12-16 PROCEDURE — 160035 HCHG PACU - 1ST 60 MINS PHASE I: Performed by: ORTHOPAEDIC SURGERY

## 2019-12-16 PROCEDURE — 700111 HCHG RX REV CODE 636 W/ 250 OVERRIDE (IP): Performed by: STUDENT IN AN ORGANIZED HEALTH CARE EDUCATION/TRAINING PROGRAM

## 2019-12-16 PROCEDURE — 160036 HCHG PACU - EA ADDL 30 MINS PHASE I: Performed by: ORTHOPAEDIC SURGERY

## 2019-12-16 PROCEDURE — 81025 URINE PREGNANCY TEST: CPT

## 2019-12-16 PROCEDURE — 160022 HCHG BLOCK: Performed by: ORTHOPAEDIC SURGERY

## 2019-12-16 PROCEDURE — 160009 HCHG ANES TIME/MIN: Performed by: ORTHOPAEDIC SURGERY

## 2019-12-16 PROCEDURE — 700101 HCHG RX REV CODE 250: Performed by: ORTHOPAEDIC SURGERY

## 2019-12-16 PROCEDURE — 73630 X-RAY EXAM OF FOOT: CPT | Mod: RT

## 2019-12-16 PROCEDURE — C1713 ANCHOR/SCREW BN/BN,TIS/BN: HCPCS | Performed by: ORTHOPAEDIC SURGERY

## 2019-12-16 PROCEDURE — 700111 HCHG RX REV CODE 636 W/ 250 OVERRIDE (IP): Performed by: ANESTHESIOLOGY

## 2019-12-16 PROCEDURE — 700105 HCHG RX REV CODE 258: Performed by: ORTHOPAEDIC SURGERY

## 2019-12-16 PROCEDURE — A9270 NON-COVERED ITEM OR SERVICE: HCPCS | Performed by: STUDENT IN AN ORGANIZED HEALTH CARE EDUCATION/TRAINING PROGRAM

## 2019-12-16 PROCEDURE — 502240 HCHG MISC OR SUPPLY RC 0272: Performed by: ORTHOPAEDIC SURGERY

## 2019-12-16 PROCEDURE — A6454 SELF-ADHER BAND W>=3" <5"/YD: HCPCS | Performed by: ORTHOPAEDIC SURGERY

## 2019-12-16 PROCEDURE — 160048 HCHG OR STATISTICAL LEVEL 1-5: Performed by: ORTHOPAEDIC SURGERY

## 2019-12-16 PROCEDURE — 160029 HCHG SURGERY MINUTES - 1ST 30 MINS LEVEL 4: Performed by: ORTHOPAEDIC SURGERY

## 2019-12-16 PROCEDURE — 500423 HCHG DRESSING, ABD COMBINE: Performed by: ORTHOPAEDIC SURGERY

## 2019-12-16 PROCEDURE — 700102 HCHG RX REV CODE 250 W/ 637 OVERRIDE(OP): Performed by: STUDENT IN AN ORGANIZED HEALTH CARE EDUCATION/TRAINING PROGRAM

## 2019-12-16 DEVICE — WIRE K- SMOOTH .062 - (3TX6=18): Type: IMPLANTABLE DEVICE | Status: FUNCTIONAL

## 2019-12-16 DEVICE — IMPLANTABLE DEVICE: Type: IMPLANTABLE DEVICE | Status: FUNCTIONAL

## 2019-12-16 RX ORDER — MIDAZOLAM HYDROCHLORIDE 1 MG/ML
INJECTION INTRAMUSCULAR; INTRAVENOUS PRN
Status: DISCONTINUED | OUTPATIENT
Start: 2019-12-16 | End: 2019-12-16 | Stop reason: SURG

## 2019-12-16 RX ORDER — HYDROMORPHONE HYDROCHLORIDE 1 MG/ML
0.2 INJECTION, SOLUTION INTRAMUSCULAR; INTRAVENOUS; SUBCUTANEOUS
Status: DISCONTINUED | OUTPATIENT
Start: 2019-12-16 | End: 2019-12-16 | Stop reason: HOSPADM

## 2019-12-16 RX ORDER — SODIUM CHLORIDE, SODIUM LACTATE, POTASSIUM CHLORIDE, CALCIUM CHLORIDE 600; 310; 30; 20 MG/100ML; MG/100ML; MG/100ML; MG/100ML
INJECTION, SOLUTION INTRAVENOUS CONTINUOUS
Status: DISCONTINUED | OUTPATIENT
Start: 2019-12-16 | End: 2019-12-16 | Stop reason: HOSPADM

## 2019-12-16 RX ORDER — HYDROMORPHONE HYDROCHLORIDE 1 MG/ML
0.1 INJECTION, SOLUTION INTRAMUSCULAR; INTRAVENOUS; SUBCUTANEOUS
Status: DISCONTINUED | OUTPATIENT
Start: 2019-12-16 | End: 2019-12-16 | Stop reason: HOSPADM

## 2019-12-16 RX ORDER — LIDOCAINE HYDROCHLORIDE 20 MG/ML
INJECTION, SOLUTION EPIDURAL; INFILTRATION; INTRACAUDAL; PERINEURAL PRN
Status: DISCONTINUED | OUTPATIENT
Start: 2019-12-16 | End: 2019-12-16

## 2019-12-16 RX ORDER — BUPIVACAINE HYDROCHLORIDE 5 MG/ML
INJECTION, SOLUTION EPIDURAL; INTRACAUDAL PRN
Status: DISCONTINUED | OUTPATIENT
Start: 2019-12-16 | End: 2019-12-16 | Stop reason: SURG

## 2019-12-16 RX ORDER — DIPHENHYDRAMINE HYDROCHLORIDE 50 MG/ML
12.5 INJECTION INTRAMUSCULAR; INTRAVENOUS
Status: DISCONTINUED | OUTPATIENT
Start: 2019-12-16 | End: 2019-12-16 | Stop reason: HOSPADM

## 2019-12-16 RX ORDER — CELECOXIB 200 MG/1
200 CAPSULE ORAL ONCE
Status: COMPLETED | OUTPATIENT
Start: 2019-12-16 | End: 2019-12-16

## 2019-12-16 RX ORDER — LIDOCAINE HYDROCHLORIDE 10 MG/ML
INJECTION, SOLUTION EPIDURAL; INFILTRATION; INTRACAUDAL; PERINEURAL
Status: COMPLETED
Start: 2019-12-16 | End: 2019-12-16

## 2019-12-16 RX ORDER — ACETAMINOPHEN 500 MG
1000 TABLET ORAL ONCE
Status: COMPLETED | OUTPATIENT
Start: 2019-12-16 | End: 2019-12-16

## 2019-12-16 RX ORDER — MAGNESIUM HYDROXIDE 1200 MG/15ML
LIQUID ORAL
Status: COMPLETED | OUTPATIENT
Start: 2019-12-16 | End: 2019-12-16

## 2019-12-16 RX ORDER — BUPIVACAINE HYDROCHLORIDE 2.5 MG/ML
INJECTION, SOLUTION EPIDURAL; INFILTRATION; INTRACAUDAL PRN
Status: DISCONTINUED | OUTPATIENT
Start: 2019-12-16 | End: 2019-12-16 | Stop reason: SURG

## 2019-12-16 RX ORDER — NAPROXEN SODIUM 220 MG
220 TABLET ORAL PRN
COMMUNITY
End: 2022-06-13

## 2019-12-16 RX ORDER — MEPERIDINE HYDROCHLORIDE 25 MG/ML
12.5 INJECTION INTRAMUSCULAR; INTRAVENOUS; SUBCUTANEOUS
Status: DISCONTINUED | OUTPATIENT
Start: 2019-12-16 | End: 2019-12-16 | Stop reason: HOSPADM

## 2019-12-16 RX ORDER — GABAPENTIN 300 MG/1
300 CAPSULE ORAL ONCE
Status: COMPLETED | OUTPATIENT
Start: 2019-12-16 | End: 2019-12-16

## 2019-12-16 RX ORDER — HALOPERIDOL 5 MG/ML
1 INJECTION INTRAMUSCULAR
Status: DISCONTINUED | OUTPATIENT
Start: 2019-12-16 | End: 2019-12-16 | Stop reason: HOSPADM

## 2019-12-16 RX ORDER — HYDROMORPHONE HYDROCHLORIDE 1 MG/ML
0.4 INJECTION, SOLUTION INTRAMUSCULAR; INTRAVENOUS; SUBCUTANEOUS
Status: DISCONTINUED | OUTPATIENT
Start: 2019-12-16 | End: 2019-12-16 | Stop reason: HOSPADM

## 2019-12-16 RX ORDER — LIDOCAINE HYDROCHLORIDE 20 MG/ML
INJECTION, SOLUTION EPIDURAL; INFILTRATION; INTRACAUDAL; PERINEURAL PRN
Status: DISCONTINUED | OUTPATIENT
Start: 2019-12-16 | End: 2019-12-16 | Stop reason: SURG

## 2019-12-16 RX ORDER — OXYCODONE HCL 5 MG/5 ML
10 SOLUTION, ORAL ORAL
Status: COMPLETED | OUTPATIENT
Start: 2019-12-16 | End: 2019-12-16

## 2019-12-16 RX ORDER — ONDANSETRON 2 MG/ML
INJECTION INTRAMUSCULAR; INTRAVENOUS PRN
Status: DISCONTINUED | OUTPATIENT
Start: 2019-12-16 | End: 2019-12-16 | Stop reason: SURG

## 2019-12-16 RX ORDER — CEFAZOLIN SODIUM 1 G/3ML
INJECTION, POWDER, FOR SOLUTION INTRAMUSCULAR; INTRAVENOUS PRN
Status: DISCONTINUED | OUTPATIENT
Start: 2019-12-16 | End: 2019-12-16 | Stop reason: SURG

## 2019-12-16 RX ORDER — ONDANSETRON 2 MG/ML
4 INJECTION INTRAMUSCULAR; INTRAVENOUS
Status: DISCONTINUED | OUTPATIENT
Start: 2019-12-16 | End: 2019-12-16 | Stop reason: HOSPADM

## 2019-12-16 RX ORDER — OXYCODONE HCL 5 MG/5 ML
5 SOLUTION, ORAL ORAL
Status: COMPLETED | OUTPATIENT
Start: 2019-12-16 | End: 2019-12-16

## 2019-12-16 RX ADMIN — OXYCODONE HYDROCHLORIDE 5 MG: 5 SOLUTION ORAL at 16:45

## 2019-12-16 RX ADMIN — FENTANYL CITRATE 25 MCG: 50 INJECTION, SOLUTION INTRAMUSCULAR; INTRAVENOUS at 14:41

## 2019-12-16 RX ADMIN — GABAPENTIN 300 MG: 300 CAPSULE ORAL at 10:55

## 2019-12-16 RX ADMIN — Medication 0.5 ML: at 10:54

## 2019-12-16 RX ADMIN — FENTANYL CITRATE 25 MCG: 50 INJECTION, SOLUTION INTRAMUSCULAR; INTRAVENOUS at 15:00

## 2019-12-16 RX ADMIN — ONDANSETRON 4 MG: 2 INJECTION INTRAMUSCULAR; INTRAVENOUS at 15:31

## 2019-12-16 RX ADMIN — FENTANYL CITRATE 50 MCG: 50 INJECTION, SOLUTION INTRAMUSCULAR; INTRAVENOUS at 13:30

## 2019-12-16 RX ADMIN — ACETAMINOPHEN 1000 MG: 500 TABLET ORAL at 10:55

## 2019-12-16 RX ADMIN — LIDOCAINE HYDROCHLORIDE 0.5 ML: 10 INJECTION, SOLUTION EPIDURAL; INFILTRATION; INTRACAUDAL at 10:54

## 2019-12-16 RX ADMIN — CEFAZOLIN 2 G: 330 INJECTION, POWDER, FOR SOLUTION INTRAMUSCULAR; INTRAVENOUS at 13:50

## 2019-12-16 RX ADMIN — BUPIVACAINE HYDROCHLORIDE 15 ML: 5 INJECTION, SOLUTION EPIDURAL; INTRACAUDAL; PERINEURAL at 13:37

## 2019-12-16 RX ADMIN — SODIUM CHLORIDE, POTASSIUM CHLORIDE, SODIUM LACTATE AND CALCIUM CHLORIDE: 600; 310; 30; 20 INJECTION, SOLUTION INTRAVENOUS at 10:54

## 2019-12-16 RX ADMIN — BUPIVACAINE HYDROCHLORIDE 15 ML: 2.5 INJECTION, SOLUTION EPIDURAL; INFILTRATION; INTRACAUDAL; PERINEURAL at 13:40

## 2019-12-16 RX ADMIN — FENTANYL CITRATE 25 MCG: 50 INJECTION, SOLUTION INTRAMUSCULAR; INTRAVENOUS at 14:53

## 2019-12-16 RX ADMIN — FENTANYL CITRATE 25 MCG: 0.05 INJECTION, SOLUTION INTRAMUSCULAR; INTRAVENOUS at 16:45

## 2019-12-16 RX ADMIN — FENTANYL CITRATE 25 MCG: 50 INJECTION, SOLUTION INTRAMUSCULAR; INTRAVENOUS at 15:05

## 2019-12-16 RX ADMIN — CELECOXIB 200 MG: 200 CAPSULE ORAL at 10:55

## 2019-12-16 RX ADMIN — LIDOCAINE HYDROCHLORIDE 30 MG: 20 INJECTION, SOLUTION EPIDURAL; INFILTRATION; INTRACAUDAL at 13:43

## 2019-12-16 RX ADMIN — MIDAZOLAM 2 MG: 1 INJECTION INTRAMUSCULAR; INTRAVENOUS at 13:30

## 2019-12-16 RX ADMIN — PROPOFOL 160 MG: 10 INJECTION, EMULSION INTRAVENOUS at 13:43

## 2019-12-16 RX ADMIN — FENTANYL CITRATE 50 MCG: 50 INJECTION, SOLUTION INTRAMUSCULAR; INTRAVENOUS at 13:43

## 2019-12-16 SDOH — HEALTH STABILITY: MENTAL HEALTH: HOW OFTEN DO YOU HAVE A DRINK CONTAINING ALCOHOL?: 2-4 TIMES A MONTH

## 2019-12-16 SDOH — HEALTH STABILITY: MENTAL HEALTH: HOW OFTEN DO YOU HAVE 6 OR MORE DRINKS ON ONE OCCASION?: NEVER

## 2019-12-16 SDOH — HEALTH STABILITY: MENTAL HEALTH: HOW MANY STANDARD DRINKS CONTAINING ALCOHOL DO YOU HAVE ON A TYPICAL DAY?: 1 OR 2

## 2019-12-16 ASSESSMENT — PAIN SCALES - GENERAL: PAIN_LEVEL: 3

## 2019-12-16 NOTE — ANESTHESIA PROCEDURE NOTES
Peripheral Block  Date/Time: 12/16/2019 1:38 PM  Performed by: George Henry M.D.  Authorized by: George Henry M.D.     Start Time:  12/16/2019 1:38 PM  End Time:  12/16/2019 1:41 PM  Reason for Block: at surgeon's request and post-op pain management    patient identified, IV checked, site marked, risks and benefits discussed, surgical consent, monitors and equipment checked, pre-op evaluation and timeout performed    Patient Position:  Supine  Prep: ChloraPrep    Monitoring:  Heart rate, continuous pulse ox and cardiac monitor  Block Region:  Lower Extremity  Lower Extremity - Block Type:  Selective FEMORAL nerve block at the Adductor Canal    Laterality:  Right  Procedures: ultrasound guided  Image captured, interpreted and electronically stored.  Local Infiltration:  Lidocaine  Strength:  1 %  Dose:  3 ml  Block Type:  Single-shot  Needle Length:  100mm  Needle Gauge:  21 G  Needle Localization:  Ultrasound guidance  Injection Assessment:  Negative aspiration for heme, no paresthesia on injection, incremental injection and local visualized surrounding nerve on ultrasound   US Guided Selective Femoral Nerve Block at Adductor Canal:   US probe placed at mid-thigh level on externally rotated leg and femur identified.  Probe directed medially until Sartorius Muscle (SM), Femoral Artery (FA) and Saphenous Nerve (SN) identified in Adductor Canal (AC).  Needle inserted anterolateral to probe in an in plane approach into a subsartorial perivascular perineural position.  After negative aspiration LA injected with ease and visualized spreading within the AC.

## 2019-12-16 NOTE — ANESTHESIA PROCEDURE NOTES
Peripheral Block  Date/Time: 12/16/2019 1:32 PM  Performed by: George Henry M.D.  Authorized by: George Henry M.D.     Start Time:  12/16/2019 1:32 PM  End Time:  12/16/2019 1:37 PM  Reason for Block: at surgeon's request and post-op pain management    patient identified, IV checked, site marked, risks and benefits discussed, surgical consent, monitors and equipment checked, pre-op evaluation and timeout performed    Patient Position:  Supine  Prep: ChloraPrep    Monitoring:  Heart rate, continuous pulse ox and cardiac monitor  Block Region:  Lower Extremity  Lower Extremity - Block Type:  SCIATIC nerve block, lateral approach    Laterality:  Right  Procedures: ultrasound guided  Image captured, interpreted and electronically stored.  Local Infiltration:  Lidocaine  Strength:  1 %  Dose:  3 ml  Block Type:  Single-shot  Needle Length:  100mm  Needle Gauge:  21 G  Needle Localization:  Ultrasound guidance  Injection Assessment:  Negative aspiration for heme, no paresthesia on injection, incremental injection and local visualized surrounding nerve on ultrasound   US Guided Sciatic Nerve Block   US probe placed several cm proximal to popliteal crease on posterior thigh and scanned caudad and cephalad until Sciatic Nerve (SN) identified superficial/lateral to popliteal artery.  Needle inserted lateral to probe in an in plane approach under direct visualization to a perineural position.  After negative aspiration LA injected with ease and visualized surrounding the SN.

## 2019-12-16 NOTE — ANESTHESIA PROCEDURE NOTES
Airway  Date/Time: 12/16/2019 1:45 PM  Performed by: George Henry M.D.  Authorized by: George Henry M.D.     Location:  OR  Urgency:  Elective  Indications for Airway Management:  Anesthesia  Spontaneous Ventilation: absent    Sedation Level:  Deep  Preoxygenated: Yes    Final Airway Type:  Supraglottic airway  Final Supraglottic Airway:  Standard LMA  SGA Size:  4  Number of Attempts at Approach:  1

## 2019-12-16 NOTE — ANESTHESIA PREPROCEDURE EVALUATION
Relevant Problems   ENDO   (+) Other thyrotoxicosis with thyrotoxic crisis or storm      OB   (+) S/P  section      Other   (+) Rheumatoid arthritis of hand (HCC)       Physical Exam    Airway   Mallampati: II  TM distance: >3 FB  Neck ROM: full       Cardiovascular - normal exam  Rhythm: regular  Rate: normal  (-) murmur     Dental - normal exam         Pulmonary - normal exam  Breath sounds clear to auscultation     Abdominal    Neurological - normal exam                 Anesthesia Plan    ASA 2       Plan - general and peripheral nerve block     Peripheral nerve block will be post-op pain control  Airway plan will be LMA        Induction: intravenous    Postoperative Plan: Postoperative administration of opioids is intended.    Pertinent diagnostic labs and testing reviewed    Informed Consent:    Anesthetic plan and risks discussed with patient.    Use of blood products discussed with: patient whom consented to blood products.

## 2019-12-16 NOTE — PROGRESS NOTES
Med rec updated and complete  Allergies reviewed  Pt reports no vitamins.  Pt reports no antibiotics in the last 2 weeks

## 2019-12-17 NOTE — ANESTHESIA QCDR
2019 [facility] Clinical Data Registry (for Quality Improvement)     Postoperative nausea/vomiting risk protocol (Adult = 18 yrs and Pediatric 3-17 yrs)- (430 and 463)  General inhalation anesthetic (NOT TIVA) with PONV risk factors: Yes  Provision of anti-emetic therapy with at least 2 different classes of agents: Yes   Patient DID NOT receive anti-emetic therapy and reason is documented in Medical Record:  N/A    Multimodal Pain Management- (AQI59)  Patient undergoing Elective Surgery (i.e. Outpatient, or ASC, or Prescheduled Surgery prior to Hospital Admission): Yes  Use of Multimodal Pain Management, two or more drugs and/or interventions, NOT including systemic opioids: Yes   Exception: Documented allergy to multiple classes of analgesics:  N/A    PACU assessment of acute postoperative pain prior to Anesthesia Care End- Applies to Patients Age = 18- (ABG7)  Initial PACU pain score is which of the following: < 7/10  Patient unable to report pain score: N/A    Post-anesthetic transfer of care checklist/protocol to PACU/ICU- (426 and 427)  Upon conclusion of case, patient transferred to which of the following locations: PACU/Non-ICU  Use of transfer checklist/protocol: Yes  Exclusion: Service Performed in Patient Hospital Room (and thus did not require transfer): N/A    PACU Reintubation- (AQI31)  General anesthesia requiring endotracheal intubation (ETT) along with subsequent extubation in OR or PACU: No  Required reintubation in the PACU: N/A  Extubation was a planned trial documented in the medical record prior to removal of the original airway device: N/A    Unplanned admission to ICU related to anesthesia service up through end of PACU care- (MD51)  Unplanned admission to ICU (not initially anticipated at anesthesia start time): No

## 2019-12-17 NOTE — DISCHARGE INSTRUCTIONS
ACTIVITY: Rest and take it easy for the first 24 hours.  A responsible adult is recommended to remain with you during that time.  It is normal to feel sleepy.  We encourage you to not do anything that requires balance, judgment or coordination.    MILD FLU-LIKE SYMPTOMS ARE NORMAL. YOU MAY EXPERIENCE GENERALIZED MUSCLE ACHES, THROAT IRRITATION, HEADACHE AND/OR SOME NAUSEA.    FOR 24 HOURS DO NOT:  Drive, operate machinery or run household appliances.  Drink beer or alcoholic beverages.   Make important decisions or sign legal documents.    SPECIAL INSTRUCTIONS:   Heel Weight [place] to Right Lower Extremity in post-op shoe  Keep dressing on until follow up  Follow up in 2 weeks.  Rest and Elevation.     DIET: To avoid nausea, slowly advance diet as tolerated, avoiding spicy or greasy foods for the first day.  Add more substantial food to your diet according to your physician's instructions.  Babies can be fed formula or breast milk as soon as they are hungry.  INCREASE FLUIDS AND FIBER TO AVOID CONSTIPATION.    SURGICAL DRESSING/BATHING: Keep Dressing Clean and Dry.     FOLLOW-UP APPOINTMENT:  A follow-up appointment should be arranged with your doctor in 2 weeks; call to schedule.    You should CALL YOUR PHYSICIAN if you develop:  Fever greater than 101 degrees F.  Pain not relieved by medication, or persistent nausea or vomiting.  Excessive bleeding (blood soaking through dressing) or unexpected drainage from the wound.  Extreme redness or swelling around the incision site, drainage of pus or foul smelling drainage.  Inability to urinate or empty your bladder within 8 hours.  Problems with breathing or chest pain.    You should call 911 if you develop problems with breathing or chest pain.  If you are unable to contact your doctor or surgical center, you should go to the nearest emergency room or urgent care center.  Physician's telephone #: [phone]    If any questions arise, call your doctor.  If your  doctor is not available, please feel free to call the Surgical Center at [phone].  The Center is open Monday through Friday from 7AM to 7PM.  You can also call the HEALTH HOTLINE open 24 hours/day, 7 days/week and speak to a nurse at [phone], or toll free at [phone].    A registered nurse may call you a few days after your surgery to see how you are doing after your procedure.    MEDICATIONS: Resume taking daily medication.  Take prescribed pain medication with food.  If no medication is prescribed, you may take non-aspirin pain medication if needed.  PAIN MEDICATION CAN BE VERY CONSTIPATING.  Take a stool softener or laxative such as senokot, pericolace, or milk of magnesia if needed.    Prescription given for Norco (For pain).  Last pain medication given at 4:45pm.    If your physician has prescribed pain medication that includes Acetaminophen (Tylenol), do not take additional Acetaminophen (Tylenol) while taking the prescribed medication.    Depression / Suicide Risk    As you are discharged from this [facility] facility, it is important to learn how to keep safe from harming yourself.    Recognize the warning signs:  · Abrupt changes in personality, positive or negative- including increase in energy   · Giving away possessions  · Change in eating patterns- significant weight changes-  positive or negative  · Change in sleeping patterns- unable to sleep or sleeping all the time   · Unwillingness or inability to communicate  · Depression  · Unusual sadness, discouragement and loneliness  · Talk of wanting to die  · Neglect of personal appearance   · Rebelliousness- reckless behavior  · Withdrawal from people/activities they love  · Confusion- inability to concentrate     If you or a loved one observes any of these behaviors or has concerns about self-harm, here's what you can do:  · Talk about it- your feelings and reasons for harming yourself  · Remove any means that you might use to hurt  yourself (examples: pills, rope, extension cords, firearm)  · Get professional help from the community (Mental Health, Substance Abuse, psychological counseling)  · Do not be alone:Call your Safe Contact- someone whom you trust who will be there for you.  · Call your local CRISIS HOTLINE 361-8354 or 121-971-9986  · Call your local Children's Mobile Crisis Response Team [place] (945) 079-9090 or www.LocalBonus  · Call the toll free National Suicide Prevention Hotlines   · National Suicide Prevention Lifeline 220-033-CBIY (6559)  · National Hope Line Network 800-SUICIDE (133-0487)

## 2019-12-17 NOTE — ANESTHESIA TIME REPORT
Anesthesia Start and Stop Event Times     Date Time Event    12/16/2019 1317 Ready for Procedure     1330 Anesthesia Start     1554 Anesthesia Stop        Responsible Staff  12/16/19    Name Role Begin End    George Henry M.D. Anesth 1330 1432    Agnes Spear M.D. Anesth 1432 1554        Preop Diagnosis (Free Text):  Pre-op Diagnosis     Rheumatoid arthritis, right hallux valgus deformity, right lesser toe defornities with metatarsalgia 2-5, right ankle pain        Preop Diagnosis (Codes):    Post op Diagnosis  Right foot pain      Premium Reason  A. 3PM - 7AM    Comments:

## 2019-12-17 NOTE — OP REPORT
DATE OF SERVICE:  12/16/2019    PREOPERATIVE DIAGNOSES:    1.  Rheumatoid arthritis.  2.  Right rheumatoid forefoot deformity.    POSTOPERATIVE DIAGNOSES:  1.  Rheumatoid arthritis.  2.  Right rheumatoid forefoot deformity.    PROCEDURE PERFORMED:  1.  Right first metatarsophalangeal joint fusion.  2.  Right metatarsal head excision, 2 through 5.  3.  Right third hammertoe correction.  4.  Right first percutaneous tendon release.    SURGEON:  Roby Espinosa MD    FIRST ASSISTANT:  Tamanna Mayer MD    SECOND ASSISTANT:  Ting Wheeler.    ANESTHESIA:  General endotracheal with popliteal block per my request for   postoperative pain management.    ESTIMATED BLOOD LOSS:  None.    COMPLICATIONS:  None.    POSTOPERATIVE PLAN:  1.  Heel-touch weightbearing.  2.  Perioperative antibiotics.  3.  Follow up in 2 weeks.    INDICATIONS:  The patient is a pleasant 44-year-old female who has had   problems with her right foot for some time.  The above diagnoses made and   options were discussed with her including operative and nonoperative.  She   elected to undergo operative intervention.  The above procedure was discussed   and all questions were answered.  Risks of surgery were explained, which   include but not limited to wound problems, infection, nerve injury, vascular   injury, need for further  surgery.  She understands she could have persistent   risks of her pain.  She understands and accepts these risks and agrees to   proceed.  Site was marked by myself prior to receiving psychotropic medicines.       PROCEDURE IN DETAIL:  The patient was brought to the operating room and   underwent general endotracheal anesthesia without complications.  A popliteal   block was given per my request for postoperative pain management.  Her right   lower extremity was prepped and draped in standard fashion in supine position   with all appropriate padding.  Positive site verification confirmed her right   lower extremity as well as  above procedure and confirmation that she received   preoperative antibiotics.  Esmarch was used to exsanguinate her foot and ankle   and leg tourniquet was inflated to 250 mmHg. A dorsal incision was made over   the first metatarsophalangeal joint.  It was dissected down to the level of   the joint.  The joint was exposed.  Using the [employer] conical reamers,   metatarsal head and proximal aspect of the proximal phalanx were reamed down   to good subchondral bone.  The joint site was then morselized.  Microsagittal   saw was used to remove her medial eminence.  The toe was then held in slight   dorsiflexion, slight valgus and neutral supination and pronation.  It was   provisionally pinned in place with a K-wire.  A lid simulating weightbearing   was placed on the foot.  She demonstrated satisfactory amount of dorsiflexion.    C-arm imaging then demonstrates satisfactory AP alignment.  A Virtual Computer   [place] first metatarsophalangeal joint plate was placed.  It was transfixed   with a nonlocking followed by 2 locking screws in the proximal phalanx   followed by compression screw followed by 2 locking screws into the   metatarsal.  Prior to compression, the K-wire provisional fixation was   removed.  C-arm imaging demonstrated satisfactory position for internal   fixation and alignment of the toe.  Next, a dorsal incision was made over the   second and fourth webspace.  It was dissected down to the level of the   extensor tendon of the second toe.  This was then cut as proximal in the   incision as possible and traced to the level of the metatarsophalangeal joint.    Microsagittal saw was used to remove the metatarsal head.  The exact   procedure was performed on the 3rd, 4th and 5th toes without deviations or   complications.  Next, she had a fixed hammertoe on the third.  An elliptical   incision was made over the dorsal aspect of her third PIP joint.  It was   brought down to the level of the distal aspect of the  proximal phalanx.    Microsagittal saw was used to remove the condyle.  A K-wire was then ran   retrograde out through the proximal aspect of the proximal phalanx out through   the tip of the toe on the 2nd and then it was transfixed back across the   previous MTPJ.  Prior to entering the metatarsal, the extensor tendon was then   anchovied over this and then the 0.062 K-wire was then ran to the base of the   second.  Exact procedure was performed on the third, fourth and fifth.    Clinically, the foot had excellent alignment.  C-arm imaging demonstrates   satisfactory position for internal fixation and alignment of the foot.  Wound   was then irrigated with copious irrigation and closed in layered fashion with   3-0 Vicryl and 3-0 nylon.  Sterile dressings were applied.  Tourniquet   released.  All toes were pink.  She was transferred to recovery room in good   condition.    Utilization of Dr. Mayer was necessary for patient positioning, holding,   retracting, wound closure, and dressing placement.  She was present throughout   the entire procedure.       ____________________________________     MD BRIAN LUO / MARILEE    DD:  12/16/2019 15:41:07  DT:  12/16/2019 17:14:01    D#:  [id number]  Job#:  [id number]    cc: [facility]

## 2019-12-17 NOTE — PROGRESS NOTES
Pt arrived to floor from PACU. Pt A+Ox4, able to make needs known, PIV Patent and SL. Pain 0/10 to RLE d/t nerve block.   CSMT's and SAMAN's WNL, RLE decreased sensation, 3+ pedal pulse,  Ice pack applied, Elevation, ice pack and post op shoe in place.     Pt's VSS; denies N/V; states pain is 0/10 but tolerable level. Dressing CDI to RLE. D/c orders received. IV dc'd. Pt changed into clothing with assistance. Discharge instructions given; pt and family verbalized understanding and questions answered. Patient states ready to d/c home. Prescriptions given. Pt  Completed phase 2.

## 2019-12-17 NOTE — OP REPORT
DATE OF SERVICE:  12/16/2019    ADDENDUM    PREOPERATIVE DIAGNOSIS:  Right ankle synovitis.    POSTOPERATIVE DIAGNOSIS:  Right ankle synovitis.    PROCEDURES:  Right ankle arthroscopic extensive debridement.    Right leg was placed over the well leg herr.  An 18-gauge needle was placed   medial to the preoperatively marked tibialis anterior above the joint line.    Ankle was insufflated with 10 mL of normal saline, 15 blade was used to make   full thickness arthrotomy.  A ____ scope was then placed into the ankle joint.    An 18-gauge needle was then placed lateral to the preoperatively marked   peroneus tertius identifying the lateral portal site.  Skin incision was made,   blunt dissection made into the joint.  Shaver was introduced.  She had   extensive arthrofibrosis and synovitis throughout the lateral aspect of her   ankle and back in through the syndesmosis.  This was all debrided with the   shaver.  Per palpation and evaluation of her cartilage demonstrated normal   cartilage.  No obvious degenerative changes or softening or abnormalities.    All instrumentation was removed.  Scope was placed in the lateral portal.    Shaver was introduced anteromedial side debriding out the anteromedial corner.    This was down into the gutter as well.  Again, the cartilage on the medial   side was pristine.  All instrumentation was removed.  Scope portals were   closed with interrupted nylon suture.       ____________________________________     MD BRIAN LUO / NTS    DD:  12/16/2019 15:46:11  DT:  12/16/2019 17:10:17    D#:  [id number]  Job#:  [id number]

## 2019-12-17 NOTE — ANESTHESIA POSTPROCEDURE EVALUATION
Patient: Isabell Serrano    Procedure Summary     Date:  12/16/19 Room / Location:  [place] [address] / SURGERY [facility]    Anesthesia Start:  1330 Anesthesia Stop:  1554    Procedures:       FUSION, JOINT, TOE - FIRST METATARSOPHALANGEAL JOINT (Right Toe)      RECONSTRUCTION, FOOT, FOR RHEUMATOID ARTHRITIS - FOREFOOT (Right Foot)      ARTHROSCOPY, ANKLE (Right Ankle) Diagnosis:  (Rheumatoid arthritis, right hallux valgus deformity, right lesser toe defornities with metatarsalgia 2-5, right ankle pain)    Surgeon:  Roby Espinosa M.D. Responsible Provider:  Agnes Spear M.D.    Anesthesia Type:  general, peripheral nerve block ASA Status:  2          Final Anesthesia Type: general, peripheral nerve block  Last vitals  BP   Blood Pressure: 124/76    Temp   36.8 °C (98.3 °F)    Pulse   Pulse: 96   Resp   15    SpO2   100 %      Anesthesia Post Evaluation    Patient location during evaluation: PACU  Patient participation: complete - patient participated  Level of consciousness: awake and alert  Pain score: 3    Airway patency: patent  Anesthetic complications: no  Cardiovascular status: hemodynamically stable  Respiratory status: acceptable and nasal cannula  Hydration status: acceptable    PONV: none           Nurse Pain Score: 0 (NPRS)

## 2019-12-17 NOTE — PROGRESS NOTES
Post op shoe fitted to pt at bedside. RN to call ortho tech at [phone] with any questions or further assistance.

## 2020-10-14 ENCOUNTER — HOSPITAL ENCOUNTER (OUTPATIENT)
Dept: LAB | Facility: MEDICAL CENTER | Age: 45
End: 2020-10-14
Attending: INTERNAL MEDICINE
Payer: COMMERCIAL

## 2020-10-14 LAB
ALBUMIN SERPL BCP-MCNC: 3.9 G/DL (ref 3.2–4.9)
ALBUMIN/GLOB SERPL: 1.1 G/DL
ALP SERPL-CCNC: 99 U/L (ref 30–99)
ALT SERPL-CCNC: 16 U/L (ref 2–50)
ANION GAP SERPL CALC-SCNC: 9 MMOL/L (ref 7–16)
AST SERPL-CCNC: 18 U/L (ref 12–45)
BASOPHILS # BLD AUTO: 0.6 % (ref 0–1.8)
BASOPHILS # BLD: 0.05 K/UL (ref 0–0.12)
BILIRUB SERPL-MCNC: 0.6 MG/DL (ref 0.1–1.5)
BUN SERPL-MCNC: 10 MG/DL (ref 8–22)
CALCIUM SERPL-MCNC: 9.1 MG/DL (ref 8.5–10.5)
CHLORIDE SERPL-SCNC: 103 MMOL/L (ref 96–112)
CO2 SERPL-SCNC: 23 MMOL/L (ref 20–33)
CREAT SERPL-MCNC: 0.34 MG/DL (ref 0.5–1.4)
CRP SERPL HS-MCNC: 1.67 MG/DL (ref 0–0.75)
EOSINOPHIL # BLD AUTO: 0.16 K/UL (ref 0–0.51)
EOSINOPHIL NFR BLD: 2 % (ref 0–6.9)
ERYTHROCYTE [DISTWIDTH] IN BLOOD BY AUTOMATED COUNT: 47.1 FL (ref 35.9–50)
ERYTHROCYTE [SEDIMENTATION RATE] IN BLOOD BY WESTERGREN METHOD: 12 MM/HOUR (ref 0–20)
GLOBULIN SER CALC-MCNC: 3.5 G/DL (ref 1.9–3.5)
GLUCOSE SERPL-MCNC: 107 MG/DL (ref 65–99)
HCT VFR BLD AUTO: 44 % (ref 37–47)
HGB BLD-MCNC: 13.3 G/DL (ref 12–16)
IMM GRANULOCYTES # BLD AUTO: 0.03 K/UL (ref 0–0.11)
IMM GRANULOCYTES NFR BLD AUTO: 0.4 % (ref 0–0.9)
LYMPHOCYTES # BLD AUTO: 2.5 K/UL (ref 1–4.8)
LYMPHOCYTES NFR BLD: 31.8 % (ref 22–41)
MCH RBC QN AUTO: 23.3 PG (ref 27–33)
MCHC RBC AUTO-ENTMCNC: 30.2 G/DL (ref 33.6–35)
MCV RBC AUTO: 76.9 FL (ref 81.4–97.8)
MONOCYTES # BLD AUTO: 0.55 K/UL (ref 0–0.85)
MONOCYTES NFR BLD AUTO: 7 % (ref 0–13.4)
NEUTROPHILS # BLD AUTO: 4.57 K/UL (ref 2–7.15)
NEUTROPHILS NFR BLD: 58.2 % (ref 44–72)
NRBC # BLD AUTO: 0 K/UL
NRBC BLD-RTO: 0 /100 WBC
PLATELET # BLD AUTO: 438 K/UL (ref 164–446)
PMV BLD AUTO: 10.7 FL (ref 9–12.9)
POTASSIUM SERPL-SCNC: 3.8 MMOL/L (ref 3.6–5.5)
PROT SERPL-MCNC: 7.4 G/DL (ref 6–8.2)
RBC # BLD AUTO: 5.72 M/UL (ref 4.2–5.4)
SODIUM SERPL-SCNC: 135 MMOL/L (ref 135–145)
WBC # BLD AUTO: 7.9 K/UL (ref 4.8–10.8)

## 2020-10-14 PROCEDURE — 85652 RBC SED RATE AUTOMATED: CPT

## 2020-10-14 PROCEDURE — 85025 COMPLETE CBC W/AUTO DIFF WBC: CPT

## 2020-10-14 PROCEDURE — 36415 COLL VENOUS BLD VENIPUNCTURE: CPT

## 2020-10-14 PROCEDURE — 80053 COMPREHEN METABOLIC PANEL: CPT

## 2020-10-14 PROCEDURE — 86140 C-REACTIVE PROTEIN: CPT

## 2020-12-10 ENCOUNTER — HOSPITAL ENCOUNTER (OUTPATIENT)
Dept: LAB | Facility: MEDICAL CENTER | Age: 45
End: 2020-12-10
Attending: INTERNAL MEDICINE
Payer: COMMERCIAL

## 2020-12-10 LAB
ALBUMIN SERPL BCP-MCNC: 3.9 G/DL (ref 3.2–4.9)
ALBUMIN/GLOB SERPL: 1 G/DL
ALP SERPL-CCNC: 84 U/L (ref 30–99)
ALT SERPL-CCNC: 13 U/L (ref 2–50)
ANION GAP SERPL CALC-SCNC: 9 MMOL/L (ref 7–16)
AST SERPL-CCNC: 13 U/L (ref 12–45)
BASOPHILS # BLD AUTO: 0.3 % (ref 0–1.8)
BASOPHILS # BLD: 0.03 K/UL (ref 0–0.12)
BILIRUB SERPL-MCNC: 0.5 MG/DL (ref 0.1–1.5)
BUN SERPL-MCNC: 9 MG/DL (ref 8–22)
CALCIUM SERPL-MCNC: 9.7 MG/DL (ref 8.5–10.5)
CHLORIDE SERPL-SCNC: 101 MMOL/L (ref 96–112)
CO2 SERPL-SCNC: 24 MMOL/L (ref 20–33)
CREAT SERPL-MCNC: 0.39 MG/DL (ref 0.5–1.4)
CRP SERPL HS-MCNC: 1.99 MG/DL (ref 0–0.75)
EOSINOPHIL # BLD AUTO: 0.1 K/UL (ref 0–0.51)
EOSINOPHIL NFR BLD: 1.2 % (ref 0–6.9)
ERYTHROCYTE [DISTWIDTH] IN BLOOD BY AUTOMATED COUNT: 43 FL (ref 35.9–50)
ERYTHROCYTE [SEDIMENTATION RATE] IN BLOOD BY WESTERGREN METHOD: 18 MM/HOUR (ref 0–20)
GLOBULIN SER CALC-MCNC: 3.8 G/DL (ref 1.9–3.5)
GLUCOSE SERPL-MCNC: 117 MG/DL (ref 65–99)
HCT VFR BLD AUTO: 44.9 % (ref 37–47)
HGB BLD-MCNC: 13.8 G/DL (ref 12–16)
IMM GRANULOCYTES # BLD AUTO: 0.02 K/UL (ref 0–0.11)
IMM GRANULOCYTES NFR BLD AUTO: 0.2 % (ref 0–0.9)
LYMPHOCYTES # BLD AUTO: 2.16 K/UL (ref 1–4.8)
LYMPHOCYTES NFR BLD: 25.1 % (ref 22–41)
MCH RBC QN AUTO: 23.2 PG (ref 27–33)
MCHC RBC AUTO-ENTMCNC: 30.7 G/DL (ref 33.6–35)
MCV RBC AUTO: 75.5 FL (ref 81.4–97.8)
MONOCYTES # BLD AUTO: 0.48 K/UL (ref 0–0.85)
MONOCYTES NFR BLD AUTO: 5.6 % (ref 0–13.4)
NEUTROPHILS # BLD AUTO: 5.82 K/UL (ref 2–7.15)
NEUTROPHILS NFR BLD: 67.6 % (ref 44–72)
NRBC # BLD AUTO: 0 K/UL
NRBC BLD-RTO: 0 /100 WBC
PLATELET # BLD AUTO: 556 K/UL (ref 164–446)
PMV BLD AUTO: 10.4 FL (ref 9–12.9)
POTASSIUM SERPL-SCNC: 3.7 MMOL/L (ref 3.6–5.5)
PROT SERPL-MCNC: 7.7 G/DL (ref 6–8.2)
RBC # BLD AUTO: 5.95 M/UL (ref 4.2–5.4)
SODIUM SERPL-SCNC: 134 MMOL/L (ref 135–145)
WBC # BLD AUTO: 8.6 K/UL (ref 4.8–10.8)

## 2020-12-10 PROCEDURE — 36415 COLL VENOUS BLD VENIPUNCTURE: CPT

## 2020-12-10 PROCEDURE — 80053 COMPREHEN METABOLIC PANEL: CPT

## 2020-12-10 PROCEDURE — 85025 COMPLETE CBC W/AUTO DIFF WBC: CPT

## 2020-12-10 PROCEDURE — 86140 C-REACTIVE PROTEIN: CPT

## 2020-12-10 PROCEDURE — 85652 RBC SED RATE AUTOMATED: CPT

## 2021-03-09 ENCOUNTER — HOSPITAL ENCOUNTER (OUTPATIENT)
Dept: LAB | Facility: MEDICAL CENTER | Age: 46
End: 2021-03-09
Attending: INTERNAL MEDICINE
Payer: COMMERCIAL

## 2021-03-09 LAB
ALBUMIN SERPL BCP-MCNC: 3.7 G/DL (ref 3.2–4.9)
ALBUMIN/GLOB SERPL: 0.9 G/DL
ALP SERPL-CCNC: 98 U/L (ref 30–99)
ALT SERPL-CCNC: 12 U/L (ref 2–50)
ANION GAP SERPL CALC-SCNC: 10 MMOL/L (ref 7–16)
AST SERPL-CCNC: 18 U/L (ref 12–45)
BASOPHILS # BLD AUTO: 0.5 % (ref 0–1.8)
BASOPHILS # BLD: 0.04 K/UL (ref 0–0.12)
BILIRUB SERPL-MCNC: 0.6 MG/DL (ref 0.1–1.5)
BUN SERPL-MCNC: 10 MG/DL (ref 8–22)
CALCIUM SERPL-MCNC: 9.6 MG/DL (ref 8.5–10.5)
CHLORIDE SERPL-SCNC: 103 MMOL/L (ref 96–112)
CO2 SERPL-SCNC: 25 MMOL/L (ref 20–33)
CREAT SERPL-MCNC: 0.35 MG/DL (ref 0.5–1.4)
CRP SERPL HS-MCNC: 1.31 MG/DL (ref 0–0.75)
EOSINOPHIL # BLD AUTO: 0.11 K/UL (ref 0–0.51)
EOSINOPHIL NFR BLD: 1.5 % (ref 0–6.9)
ERYTHROCYTE [DISTWIDTH] IN BLOOD BY AUTOMATED COUNT: 45.5 FL (ref 35.9–50)
GLOBULIN SER CALC-MCNC: 3.9 G/DL (ref 1.9–3.5)
GLUCOSE SERPL-MCNC: 96 MG/DL (ref 65–99)
HCT VFR BLD AUTO: 46 % (ref 37–47)
HGB BLD-MCNC: 14 G/DL (ref 12–16)
IMM GRANULOCYTES # BLD AUTO: 0.02 K/UL (ref 0–0.11)
IMM GRANULOCYTES NFR BLD AUTO: 0.3 % (ref 0–0.9)
LYMPHOCYTES # BLD AUTO: 2.37 K/UL (ref 1–4.8)
LYMPHOCYTES NFR BLD: 32.5 % (ref 22–41)
MCH RBC QN AUTO: 23.6 PG (ref 27–33)
MCHC RBC AUTO-ENTMCNC: 30.4 G/DL (ref 33.6–35)
MCV RBC AUTO: 77.7 FL (ref 81.4–97.8)
MONOCYTES # BLD AUTO: 0.5 K/UL (ref 0–0.85)
MONOCYTES NFR BLD AUTO: 6.9 % (ref 0–13.4)
NEUTROPHILS # BLD AUTO: 4.25 K/UL (ref 2–7.15)
NEUTROPHILS NFR BLD: 58.3 % (ref 44–72)
NRBC # BLD AUTO: 0 K/UL
NRBC BLD-RTO: 0 /100 WBC
PLATELET # BLD AUTO: 473 K/UL (ref 164–446)
PMV BLD AUTO: 11 FL (ref 9–12.9)
POTASSIUM SERPL-SCNC: 3.7 MMOL/L (ref 3.6–5.5)
PROT SERPL-MCNC: 7.6 G/DL (ref 6–8.2)
RBC # BLD AUTO: 5.92 M/UL (ref 4.2–5.4)
SODIUM SERPL-SCNC: 138 MMOL/L (ref 135–145)
WBC # BLD AUTO: 7.3 K/UL (ref 4.8–10.8)

## 2021-03-09 PROCEDURE — 85652 RBC SED RATE AUTOMATED: CPT

## 2021-03-09 PROCEDURE — 85025 COMPLETE CBC W/AUTO DIFF WBC: CPT

## 2021-03-09 PROCEDURE — 80053 COMPREHEN METABOLIC PANEL: CPT

## 2021-03-09 PROCEDURE — 36415 COLL VENOUS BLD VENIPUNCTURE: CPT

## 2021-03-09 PROCEDURE — 86140 C-REACTIVE PROTEIN: CPT

## 2021-03-10 LAB — ERYTHROCYTE [SEDIMENTATION RATE] IN BLOOD BY WESTERGREN METHOD: 18 MM/HOUR (ref 0–20)

## 2022-02-01 PROBLEM — M79.672 FOOT PAIN, LEFT: Status: ACTIVE | Noted: 2022-02-01

## 2022-02-01 PROBLEM — M20.42 HAMMERTOE OF LEFT FOOT: Status: ACTIVE | Noted: 2022-02-01

## 2022-02-23 ENCOUNTER — HOSPITAL ENCOUNTER (OUTPATIENT)
Facility: MEDICAL CENTER | Age: 47
End: 2022-02-23
Attending: ANESTHESIOLOGY
Payer: COMMERCIAL

## 2022-02-23 LAB — COVID ORDER STATUS COVID19: NORMAL

## 2022-02-23 PROCEDURE — U0003 INFECTIOUS AGENT DETECTION BY NUCLEIC ACID (DNA OR RNA); SEVERE ACUTE RESPIRATORY SYNDROME CORONAVIRUS 2 (SARS-COV-2) (CORONAVIRUS DISEASE [COVID-19]), AMPLIFIED PROBE TECHNIQUE, MAKING USE OF HIGH THROUGHPUT TECHNOLOGIES AS DESCRIBED BY CMS-2020-01-R: HCPCS

## 2022-02-23 PROCEDURE — U0005 INFEC AGEN DETEC AMPLI PROBE: HCPCS

## 2022-02-24 LAB
SARS-COV-2 RNA RESP QL NAA+PROBE: NOTDETECTED
SPECIMEN SOURCE: NORMAL

## 2022-04-10 NOTE — PATIENT INSTRUCTIONS
AFTER VISIT INSTRUCTIONS    Below are important information to help you navigate your healthcare needs and help us serve you safely and effectively:  • If laboratory tests and/or imaging studies were ordered, remember to go get them done.  • If new prescriptions or refills were sent to the pharmacy, remember to go pick them up.  • Take your medications exactly as prescribed unless instructed otherwise.  • Follow up with your primary care provider and/or specialists as scheduled.  • If there are significant findings from your lab tests and imaging studies, I will notify you with explanations via [employer] or phone call, otherwise you can view them on [employer] and let me know if you have any questions.  • Sign up for [employer] if you have not already done so, in order to have access to the results of your lab tests and imaging studies, and to be able to send and receive messages from me.  • Please note that [employer] messaging is for non-urgent matters that do not require immediate attention and are typically read only during office hours, so do not expect immediate responses from me.

## 2022-04-10 NOTE — PROGRESS NOTES
[facility] - ARTHRIT[facility]  [address] [place] [address], [address], [place] [address]  Phone: [phone] / Fax: [phone]    RHEUMATOLOGY NEW PATIENT VISIT NOTE      DATE OF SERVICE: 2022    REFERRING PROVIDER:  Roby Espinosa M.D.  [address] [id number]      SUBJECTIVE:     CHIEF COMPLAINT:   Chief Complaint   Patient presents with   • New Patient     Rheumatoid arthritis        HISTORY OF PRESENT ILLNESS:  Isabell Serrano is a 47 y.o. female with pertinent history notable for seropositive rheumatoid arthritis (positive RF and ACPA) diagnosed in  but symptomatic since , secondary osteoarthritis s/p foot/ankle arthroscopies, and presumed Hashimoto's thyroiditis (based on positive anti-TPO). She was previously under the care of a local rheumatologist, and comes for transfer of care. Her past treatments have included methotrexate (stopped due to intolerable GI side effects), hydroxychloroquine (unclear why stopped), leflunomide last taken 2 months ago (was not effective), and Medrol 4mg since around . She reports joint pain in hands (mostly MCPs and PIPs), wrists, shoulders, feet and ankles, associated with morning stiffness lasting over 1 hour and improving with activity.  Pertinent labs as of 3/2021: Strongly positive RF >360 and ACPA 188, positive PAPO 1:320 and anti-.3 in 2017, elevated CRP 1.31 with normal ESR, unremarkable CMP, and CBC showing microcytosis MCV 77.7 with normal Hgb/Hct.    REVIEW OF SYSTEMS:  Except as noted in the history above, a complete review of systems with emphasis on autoimmune inflammatory conditions was otherwise negative for any significant symptoms.      ACTIVE PROBLEM LIST:  Patient Active Problem List   Diagnosis   • Rheumatoid arthritis of hand (HCC)   • Seropositive rheumatoid arthritis (HCC)   • H/O breast augmentation   • Other thyrotoxicosis with thyrotoxic crisis or storm   • S/P  section   •  Acute post-operative pain   • Acute blood loss as cause of postoperative anemia   • Hammertoe of left foot   • Foot pain, left   No problem-specific Assessment & Plan notes found for this encounter.      PAST MEDICAL HISTORY:  Past Medical History:   Diagnosis Date   • Arthritis    • Arthritis, rheumatoid (HCC)    • Disorder of thyroid     hyperthyroid   • RA (rheumatoid arthritis) (HCC) 11/5/2013       PAST SURGICAL HISTORY:  Past Surgical History:   Procedure Laterality Date   • PB FUSION BIG TOE,MT-P JT Left 3/2/2022    Procedure: LEFT FOOT FIRST METATARSOPHALANGEAL JOINT FUSION, LEFT RHEUMATOID FOREFOOT RECONSTRUCTION, REPAIRS AS INDICATED.;  Surgeon: Roby Espinosa M.D.;  Location: [facility];  Service: Orthopedics   • TOE FUSION Right 12/16/2019    Procedure: FUSION, JOINT, TOE - FIRST METATARSOPHALANGEAL JOINT;  Surgeon: Roby Espinosa M.D.;  Location: [facility];  Service: Orthopedics   • FOOT RECONSTRUCTION RHEUMATIC Right 12/16/2019    Procedure: RECONSTRUCTION, FOOT, FOR RHEUMATOID ARTHRITIS - FOREFOOT;  Surgeon: Roby Espinosa M.D.;  Location: [facility];  Service: Orthopedics   • ANKLE ARTHROSCOPY Right 12/16/2019    Procedure: ARTHROSCOPY, ANKLE;  Surgeon: Roby Espinosa M.D.;  Location: [facility];  Service: Orthopedics   • PRIMARY C SECTION  1/7/2019    Procedure: PRIMARY C SECTION;  Surgeon: Carlee Long M.D.;  Location: LABOR AND DELIVERY;  Service: Labor and Delivery   • MAMMOPLASTY AUGMENTATION  7/12/2011    Performed by MARIBELL GONZALES at [facility]       MEDICATIONS:  Current Outpatient Medications   Medication Sig Dispense Refill   • Etanercept 50 MG/ML Solution Auto-injector Inject 50 mg under the skin every 7 days for 30 days. 4 Each 5   • hydroxychloroquine (PLAQUENIL) 200 MG Tab Take 1 Tablet by mouth every day for 30 days. 30 Tablet 5   • predniSONE (DELTASONE) 1 MG Tab Take 4 Tablets by mouth every  "day for 14 days, THEN 3 Tablets every day for 14 days, THEN 2 Tablets every day for 14 days, THEN 1 Tablet every day for 14 days. 30 Tablet 0   • naproxen (ANAPROX) 220 MG tablet Take 220 mg by mouth as needed. Indications: Pain     • methylPREDNISolone (MEDROL) 4 MG Tab Take 1 Tab by mouth every day. 10 Tab 0   • hydrOXYzine pamoate (VISTARIL) 50 MG Cap Take 1 Capsule by mouth 3 times a day as needed for Itching (nausea). (Patient not taking: No sig reported) 20 Capsule 1   • gabapentin (NEURONTIN) 300 MG Cap Take 1 po qhs (Patient not taking: No sig reported) 7 Capsule 0   • acetaminophen (TYLENOL) 500 MG Tab Take 2 tabs up to 3 times a day prn pain (Patient not taking: No sig reported) 60 Tablet 0   • methimazole (TAPAZOLE) 5 MG Tab Take 3 Tabs by mouth every 6 hours. 90 Tab 3     No current facility-administered medications for this visit.       ALLERGIES:   No Known Allergies    IMMUNIZATIONS:  Immunization History   Administered Date(s) Administered   • Robert SARS-CoV-2 Vaccine 07/12/2021   • Tdap Vaccine 02/22/2016       SOCIAL HISTORY:   Social History     Tobacco Use   • Smoking status: Never Smoker   • Smokeless tobacco: Never Used   Vaping Use   • Vaping Use: Never used   Substance Use Topics   • Alcohol use: Yes   • Drug use: No       FAMILY HISTORY:  Family History   Problem Relation Age of Onset   • Cancer Father         throat/prostate   • Rheumatologic Disease Paternal Aunt    • Other Paternal Aunt         MS   • Rheumatologic Disease Paternal Aunt    • Arthritis Paternal Aunt         RA        OBJECTIVE:     Vital Signs: /80 (BP Location: Left arm, Patient Position: Sitting, BP Cuff Size: Adult)   Pulse 92   Temp 36.5 °C (97.7 °F) (Temporal)   Resp 16   Ht 1.778 m (5' 10\")   Wt 66.2 kg (146 lb)   SpO2 97% Body mass index is 20.95 kg/m².    General: Appears well and comfortable; no acute distress  Eyes: Extraocular muscles and vision intact; no conjunctival lesions  ENT: Wearing " facemask, but no visible lesions  Head/Neck: Neck supple; no cervical LAD; no scalp lesions  Cardiovascular: Regular rate and rhythm; no murmurs  Respiratory: Clear to auscultation bilaterally; no wheezes, rales, or rhonchi  Gastrointestinal: Abdomen soft and non-tender; no masses or organomegaly  Integumentary: Skin soft and supple; no significant cutaneous lesions  Musculoskeletal: Mild tenderness on wrists; reducible ulnar deviation at MCPs with synovial hypertrophy (left much worse than right); limited ROM on wrists; no significant tenderness at other joints  Neurologic: No focal sensory or motor deficits  Psychiatric: Mood and affect appropriate      LABORATORY RESULTS REVIEWED AND INTERPRETED BY ME:  Lab Results   Component Value Date/Time    WBC 7.3 03/09/2021 09:56 AM    RBC 5.92 (H) 03/09/2021 09:56 AM    HEMOGLOBIN 14.0 03/09/2021 09:56 AM    HEMATOCRIT 46.0 03/09/2021 09:56 AM    MCV 77.7 (L) 03/09/2021 09:56 AM    MCH 23.6 (L) 03/09/2021 09:56 AM    MCHC 30.4 (L) 03/09/2021 09:56 AM    RDW 45.5 03/09/2021 09:56 AM    PLATELETCT 473 (H) 03/09/2021 09:56 AM    MPV 11.0 03/09/2021 09:56 AM    NEUTS 4.25 03/09/2021 09:56 AM    POLYS 100 02/21/2014 10:00 AM    LYMPHOCYTES 32.50 03/09/2021 09:56 AM    MONOCYTES 6.90 03/09/2021 09:56 AM    EOSINOPHILS 1.50 03/09/2021 09:56 AM    BASOPHILS 0.50 03/09/2021 09:56 AM    HYPOCHROMIA 1+ 12/30/2013 02:18 PM    ANISOCYTOSIS 1+ 11/05/2013 01:08 PM     Lab Results   Component Value Date/Time    SODIUM 138 03/09/2021 09:56 AM    POTASSIUM 3.7 03/09/2021 09:56 AM    CHLORIDE 103 03/09/2021 09:56 AM    CO2 25 03/09/2021 09:56 AM    GLUCOSE 96 03/09/2021 09:56 AM    BUN 10 03/09/2021 09:56 AM    CREATININE 0.35 (L) 03/09/2021 09:56 AM     Lab Results   Component Value Date/Time    ASTSGOT 18 03/09/2021 09:56 AM    ALTSGPT 12 03/09/2021 09:56 AM    ALKPHOSPHAT 98 03/09/2021 09:56 AM    TBILIRUBIN 0.6 03/09/2021 09:56 AM    TOTPROTEIN 7.6 03/09/2021 09:56 AM    ALBUMIN 3.7  03/09/2021 09:56 AM    CALCIUM 9.6 03/09/2021 09:56 AM    URICACID 3.5 11/30/2011 01:40 PM     Lab Results   Component Value Date/Time    FLTYPE Synovial 02/21/2014 10:00 AM     Lab Results   Component Value Date/Time    SEDRATEWES 18 03/09/2021 09:56 AM    CREACTPROT 1.31 (H) 03/09/2021 09:56 AM     Lab Results   Component Value Date/Time    RHEUMFACTN >360 (H) 11/30/2017 02:32 PM    CCPANTIBODY 188 (H) 11/30/2017 02:32 PM      Lab Results   Component Value Date/Time    ANTINUCAB Detected (A) 11/30/2017 02:32 PM     Lab Results   Component Value Date/Time    COLORURINE Yellow 11/30/2018 03:30 PM    SPECGRAVITY 1.008 11/30/2018 03:30 PM    PHURINE 5.5 11/30/2018 03:30 PM    GLUCOSEUR Negative 11/30/2018 03:30 PM    KETONES 40 (A) 11/30/2018 03:30 PM    PROTEINURIN Negative 11/30/2018 03:30 PM     Lab Results   Component Value Date/Time    TSHULTRASEN <0.005 (L) 11/30/2018 11:00 AM    FREET4 1.92 (H) 11/30/2018 11:00 AM    MICROSOMALA 244.3 (H) 11/30/2017 02:32 PM     Lab Results   Component Value Date/Time    25HYDROXY 13 (L) 12/30/2013 02:18 PM     Lab Results   Component Value Date/Time    HEPBSAG Negative 12/30/2013 02:18 PM    HEPBCORTOT Negative 12/30/2013 02:18 PM    HEPCAB Negative 12/30/2013 02:18 PM     Lab Results   Component Value Date/Time    IGGLYMEABS 0.54 11/30/2011 01:40 PM       RADIOLOGY RESULTS REVIEWED AND INTERPRETED BY ME:    Results for orders placed during the hospital encounter of 12/30/13    DX-JOINT SURVEY-HANDS SINGLE VIEW    Impression  Apparent joint space narrowing and bony erosion involving the right fifth metacarpal head.    Results for orders placed during the hospital encounter of 12/30/13    DX-JOINT SURVEY-FEET SINGLE VIEW    Impression  Bilateral bony erosions consistent with history of rheumatoid arthritis.    Results for orders placed during the hospital encounter of 12/30/13    DX-ANKLE 2- VIEWS    Impression  Apparent ankle joint effusion without evidence of bony  abnormality.    Results for orders placed in visit on 03/17/22    DX-FOOT-COMPLETE 3+ LEFT    Results for orders placed during the hospital encounter of 12/30/13    DX-KNEES-AP BILATERAL STANDING    Impression  Unremarkable examination.    Results for orders placed in visit on 11/30/15    MR-CERVICAL SPINE-W/O    Impression  1.  There is mild disc bulge at C3-4 without significant spinal or neural foraminal stenosis.  2.  Mild generalized thyroid enlargement.    Results for orders placed in visit on 11/30/15    MR-LUMBAR SPINE-W/O    Impression  Mild degenerative disease in the lumbar spine as described above.    All relevant laboratory and imaging results reported on this note were reviewed and interpreted by me.      ASSESSMENT AND PLAN:     Isabell Serrano is a 47 y.o. female with history as noted above whose presentation merits the following diagnostic and clinical status impressions and recommendations:    1. Seropositive rheumatoid arthritis (positive RF and ACPA)  Her clinical picture is compatible with seropositive rheumatoid arthritis with clinical evidence of active inflammatory arthritis. The presence of strongly positive RF and ACPA is associated with significant risk of joint erosions and extra-articular involvement over the long-term if not adequately treated. To prevent disease progression, treatment with immunomodulatory and/or immunosuppressive therapy is highly recommended as noted below.  - DX-JOINT SURVEY-HANDS SINGLE VIEW  - DX-JOINT SURVEY-FEET SINGLE VIEW  - Etanercept 50 MG/ML Solution Auto-injector; Inject 50 mg under the skin every 7 days for 30 days.  Dispense: 4 Each; Refill: 5  - hydroxychloroquine (PLAQUENIL) 200 MG Tab; Take 1 Tablet by mouth every day for 30 days.  Dispense: 30 Tablet; Refill: 5  - predniSONE (DELTASONE) 1 MG Tab; Take 4 Tablets by mouth every day for 14 days, THEN 3 Tablets every day for 14 days, THEN 2 Tablets every day for 14 days, THEN 1 Tablet every day  for 14 days.  Dispense: 140 Tablet; Refill: 0    2. High risk medication use  No current history, physical findings, or laboratory evidence to suggest significant adverse drug effects or opportunistic infections.  - Quantiferon Gold Plus TB (4 tube)  - HEP B SURFACE ANTIGEN  - Need to address other vaccine needs at next visit    3. Long term current use of systemic steroids  Though the dose has been low, this still puts her at risk for adverse effects, so would taper as noted below while initiating DMARD therapy as noted above.  - predniSONE (DELTASONE) 1 MG Tab; Take 4 Tablets by mouth every day for 14 days, THEN 3 Tablets every day for 14 days, THEN 2 Tablets every day for 14 days, THEN 1 Tablet every day for 14 days.  Dispense: 140 Tablet; Refill: 0    4. Long-term use of hydroxychloroquine  Risk of retinal toxicity is considered minimal in this case given the low dose of hydroxychloroquine used (less than 5 mg/kg of body weight) per rheumatology/ophthalmology guidelines. That said, counseled that baseline ophthalmologic evaluation is still recommended with the requency of routine follow-up eye exams determined by the ophthalmologist, typically annually or every other year.  - Referral to Ophthalmology    5. Positive PAPO (antinuclear antibody)  There is insufficient objective historical, physical, and laboratory evidence to suggest the presence of an underlying PAPO-associated autoimmune disease, such as Sjogren syndrome, systemic lupus, inflammatory myopathy, or systemic sclerosis. Notably, the PAPO test is highly sensitive and lacks diagnostic specificity as it can be seen in a variety of non-rheumatic conditions, such as autoimmune thyroid disease (especially Hashimoto's thyroiditis with positive anti-TPO which is the likely trigger here), acute or chronic infections (especially viral), and malignancy (especially lymphoma), as well as in over 10% of healthy individuals with no clinical evidence of autoimmune  rheumatic disease. In any case, it must be interpreted in the context of the overall clinical picture with close attention to disease-specific manifestations. That said, the presence of persistently positive PAPO, especially in high titers, does confer some risk of PAPO-associated disease, so if unexplainable inflammatory symptoms develop and/or persist in such a case, clinical follow-up for re-evaluation is reasonable.  - No further investigation necessary at this time    FOLLOW-UP: Return in about 2 months (around 6/12/2022) for Short.           Thank you for giving me the opportunity to participate in the care of Isabell Serrano.    Nick Multani MD, MS  Rheumatologist, [facility]  , Department of Internal Medicine  [facility]

## 2022-04-12 ENCOUNTER — OFFICE VISIT (OUTPATIENT)
Dept: RHEUMATOLOGY | Facility: MEDICAL CENTER | Age: 47
End: 2022-04-12
Payer: COMMERCIAL

## 2022-04-12 VITALS
RESPIRATION RATE: 16 BRPM | HEIGHT: 70 IN | WEIGHT: 146 LBS | OXYGEN SATURATION: 97 % | DIASTOLIC BLOOD PRESSURE: 80 MMHG | HEART RATE: 92 BPM | BODY MASS INDEX: 20.9 KG/M2 | TEMPERATURE: 97.7 F | SYSTOLIC BLOOD PRESSURE: 130 MMHG

## 2022-04-12 DIAGNOSIS — Z79.899 LONG-TERM USE OF HYDROXYCHLOROQUINE: ICD-10-CM

## 2022-04-12 DIAGNOSIS — M05.9 SEROPOSITIVE RHEUMATOID ARTHRITIS (HCC): ICD-10-CM

## 2022-04-12 DIAGNOSIS — R76.8 POSITIVE ANA (ANTINUCLEAR ANTIBODY): ICD-10-CM

## 2022-04-12 DIAGNOSIS — Z79.899 HIGH RISK MEDICATION USE: ICD-10-CM

## 2022-04-12 DIAGNOSIS — Z79.52 LONG TERM CURRENT USE OF SYSTEMIC STEROIDS: ICD-10-CM

## 2022-04-12 PROCEDURE — 99204 OFFICE O/P NEW MOD 45 MIN: CPT | Performed by: STUDENT IN AN ORGANIZED HEALTH CARE EDUCATION/TRAINING PROGRAM

## 2022-04-12 RX ORDER — PREDNISONE 1 MG/1
TABLET ORAL
Qty: 140 TABLET | Refills: 0 | Status: SHIPPED | OUTPATIENT
Start: 2022-04-12 | End: 2022-05-20 | Stop reason: SDUPTHER

## 2022-04-12 RX ORDER — PREDNISONE 1 MG/1
TABLET ORAL
Qty: 30 TABLET | Refills: 0 | Status: SHIPPED | OUTPATIENT
Start: 2022-04-12 | End: 2022-04-12 | Stop reason: SDUPTHER

## 2022-04-12 RX ORDER — HYDROXYCHLOROQUINE SULFATE 200 MG/1
200 TABLET, FILM COATED ORAL DAILY
Qty: 30 TABLET | Refills: 5 | Status: SHIPPED | OUTPATIENT
Start: 2022-04-12 | End: 2022-05-12

## 2022-04-12 ASSESSMENT — FIBROSIS 4 INDEX: FIB4 SCORE: 0.49

## 2022-04-12 NOTE — Clinical Note
Amanda Ca,  Thank you for the referral. See my note below and let me know if you have any questions.   Hope all is well.   Nick

## 2022-05-20 DIAGNOSIS — Z79.52 LONG TERM CURRENT USE OF SYSTEMIC STEROIDS: ICD-10-CM

## 2022-05-20 DIAGNOSIS — M05.9 SEROPOSITIVE RHEUMATOID ARTHRITIS (HCC): ICD-10-CM

## 2022-05-20 RX ORDER — PREDNISONE 5 MG/1
TABLET ORAL
Qty: 70 TABLET | Refills: 0 | Status: SHIPPED | OUTPATIENT
Start: 2022-05-20 | End: 2022-06-17

## 2022-05-20 NOTE — TELEPHONE ENCOUNTER
Patient called requesting refill for prednisone because she is having pain. Pt is on Plaquenil and said that she has not started Enbrel injections because her pharmacy notified her that they are still waiting on insurance approval. I have started the Prior auth thru cover my meds with insurance. I was not notified from pharmacy of needing auth.

## 2022-06-03 ENCOUNTER — TELEPHONE (OUTPATIENT)
Dept: RHEUMATOLOGY | Facility: MEDICAL CENTER | Age: 47
End: 2022-06-03
Payer: COMMERCIAL

## 2022-06-03 NOTE — TELEPHONE ENCOUNTER
I notified patient that I have received a notification form [employer] on the PA for Enbrel. They stated pt is not covered and I should have pt contact [facility]-Pt doesn't know what this means and stated that she will contact her Jose Carlostjie manuel and get all the information we need to proceed with PA

## 2022-06-13 ENCOUNTER — HOSPITAL ENCOUNTER (OUTPATIENT)
Dept: LAB | Facility: MEDICAL CENTER | Age: 47
End: 2022-06-13
Attending: STUDENT IN AN ORGANIZED HEALTH CARE EDUCATION/TRAINING PROGRAM
Payer: COMMERCIAL

## 2022-06-13 ENCOUNTER — OFFICE VISIT (OUTPATIENT)
Dept: RHEUMATOLOGY | Facility: MEDICAL CENTER | Age: 47
End: 2022-06-13
Payer: COMMERCIAL

## 2022-06-13 VITALS
HEIGHT: 70 IN | OXYGEN SATURATION: 98 % | WEIGHT: 152.6 LBS | BODY MASS INDEX: 21.85 KG/M2 | RESPIRATION RATE: 16 BRPM | SYSTOLIC BLOOD PRESSURE: 134 MMHG | DIASTOLIC BLOOD PRESSURE: 90 MMHG | TEMPERATURE: 98.6 F | HEART RATE: 100 BPM

## 2022-06-13 DIAGNOSIS — Z79.899 LONG-TERM USE OF HYDROXYCHLOROQUINE: ICD-10-CM

## 2022-06-13 DIAGNOSIS — Z79.899 HIGH RISK MEDICATION USE: ICD-10-CM

## 2022-06-13 DIAGNOSIS — M05.80 SEROPOSITIVE EROSIVE RHEUMATOID ARTHRITIS (HCC): ICD-10-CM

## 2022-06-13 LAB — HBV SURFACE AG SER QL: NORMAL

## 2022-06-13 PROCEDURE — 36415 COLL VENOUS BLD VENIPUNCTURE: CPT

## 2022-06-13 PROCEDURE — 87340 HEPATITIS B SURFACE AG IA: CPT

## 2022-06-13 PROCEDURE — 86480 TB TEST CELL IMMUN MEASURE: CPT

## 2022-06-13 PROCEDURE — 99214 OFFICE O/P EST MOD 30 MIN: CPT | Performed by: STUDENT IN AN ORGANIZED HEALTH CARE EDUCATION/TRAINING PROGRAM

## 2022-06-13 RX ORDER — FOLIC ACID 1 MG/1
1 TABLET ORAL DAILY
Qty: 90 TABLET | Refills: 3 | Status: SHIPPED | OUTPATIENT
Start: 2022-06-13 | End: 2022-08-25

## 2022-06-13 RX ORDER — HYDROXYCHLOROQUINE SULFATE 200 MG/1
200 TABLET, FILM COATED ORAL
COMMUNITY
Start: 2022-05-16 | End: 2023-08-04

## 2022-06-13 RX ORDER — SULFASALAZINE 500 MG/1
500 TABLET, DELAYED RELEASE ORAL 2 TIMES DAILY WITH MEALS
Qty: 60 TABLET | Refills: 5 | Status: SHIPPED | OUTPATIENT
Start: 2022-06-13 | End: 2022-08-25

## 2022-06-13 ASSESSMENT — FIBROSIS 4 INDEX: FIB4 SCORE: 0.49

## 2022-06-13 NOTE — PROGRESS NOTES
[facility] - ARTHRITIS [facility]  [address], [address], [place] [address]  Phone: [phone] / Fax: [phone]    RHEUMATOLOGY FOLLOW-UP VISIT NOTE      DATE OF SERVICE: 06/13/2022    PRIMARY CARE PROVIDER:   Jenniffer Bermudez M.D.  [address] [place] [address] [place] [id number]    LAST CLINIC VISIT:  6/3/2022      SUBJECTIVE:     CHIEF COMPLAINT:   Chief Complaint   Patient presents with   • Follow-Up     Rheumatoid Arthritis        RHEUMATOLOGIC HISTORY:  Isabell Serrano is a 47 y.o. female with pertinent history notable for seropositive erosive rheumatoid arthritis (positive RF and ACPA) diagnosed in 2010 but symptomatic since 2007, secondary osteoarthritis status post foot/ankle arthroscopies, and presumed  Hashimoto's thyroiditis (based on positive anti-TPO). Previously under the care of a local rheumatologist, she initially presented on 4/12/22 for transfer of care, reported joint pain in her hands (mostly MCPs and PIPs), wrists, shoulders, ankles, and feet, associated with over 1 hour of morning stiffness that improves with activity.     Pertinent treatments to date: Methotrexate (stopped due to intolerable GI side effects), leflunomide  (stopped in 2/2022 due to ineffectiveness), hydroxychloroquine (resumed in 4/2022 after being stopped for some time for unclear reasons), and Medrol 4mg (from 2014/2015 to 4/2022 as it was tapered off with initiation of prednisone 4 mg taper followed by 20 mg taper).    Pertinent labs as of 3/2021: Strongly positive RF >360 and ACPA 188, positive PAPO 1:320 and anti-.3 in 11/2017, elevated CRP 1.31 with normal ESR, unremarkable CMP, and CBC showing microcytosis MCV 77.7 with normal Hgb/Hct.    INTERVAL HISTORY:  Reports mild pain in wrists and no significant morning stiffness.  Currently on prednisone 5mg with 3 days left on the 20mg taper.  Enbrel prior authorization still pending due to change in insurance.    REVIEW OF  SYSTEMS:  Except as noted in the history above, a complete review of systems with emphasis on autoimmune inflammatory conditions was otherwise negative for any significant symptoms.      ACTIVE PROBLEM LIST:  Patient Active Problem List   Diagnosis   • Rheumatoid arthritis of hand (HCC)   • Seropositive erosive rheumatoid arthritis (HCC)   • H/O breast augmentation   • Other thyrotoxicosis with thyrotoxic crisis or storm   • S/P  section   • Acute post-operative pain   • Acute blood loss as cause of postoperative anemia   • Hammertoe of left foot   • Foot pain, left   • High risk medication use   • Long-term use of hydroxychloroquine   • Positive PAPO (antinuclear antibody)   • Long term current use of systemic steroids   No problem-specific Assessment & Plan notes found for this encounter.      PAST MEDICAL HISTORY:  Past Medical History:   Diagnosis Date   • Arthritis    • Arthritis, rheumatoid ([facility])    • Disorder of thyroid     hyperthyroid   • RA (rheumatoid arthritis) ([facility]) 2013       PAST SURGICAL HISTORY:  Past Surgical History:   Procedure Laterality Date   • PB FUSION BIG TOE,MT-P JT Left 3/2/2022    Procedure: LEFT FOOT FIRST METATARSOPHALANGEAL JOINT FUSION, LEFT RHEUMATOID FOREFOOT RECONSTRUCTION, REPAIRS AS INDICATED.;  Surgeon: Roby Espinosa M.D.;  Location: [facility];  Service: Orthopedics   • TOE FUSION Right 2019    Procedure: FUSION, JOINT, TOE - FIRST METATARSOPHALANGEAL JOINT;  Surgeon: Roby Espinosa M.D.;  Location: [facility];  Service: Orthopedics   • FOOT RECONSTRUCTION RHEUMATIC Right 2019    Procedure: RECONSTRUCTION, FOOT, FOR RHEUMATOID ARTHRITIS - FOREFOOT;  Surgeon: Roby Espinosa M.D.;  Location: [facility];  Service: Orthopedics   • ANKLE ARTHROSCOPY Right 2019    Procedure: ARTHROSCOPY, ANKLE;  Surgeon: Roby Espinosa M.D.;  Location: [facility];  Service: Orthopedics   • PRIMARY C  "SECTION  1/7/2019    Procedure: PRIMARY C SECTION;  Surgeon: Carlee Long M.D.;  Location: LABOR AND DELIVERY;  Service: Labor and Delivery   • MAMMOPLASTY AUGMENTATION  7/12/2011    Performed by MARIBELL GONZALES at [facility]       MEDICATIONS:  Current Outpatient Medications   Medication Sig Dispense Refill   • hydroxychloroquine (PLAQUENIL) 200 MG Tab Take 200 mg by mouth every day.     • Methotrexate, PF, 10 MG/0.2ML Solution Auto-injector Inject 15 mg under the skin every 7 days for 30 days. 1.6 mL 5   • sulfaSALAzine (AZULFIDINE EN-TAB) 500 MG EC tablet Take 1 Tablet by mouth 2 times a day with meals. 60 Tablet 5   • folic acid (FOLVITE) 1 MG Tab Take 1 Tablet by mouth every day for 90 days. 90 Tablet 3   • predniSONE (DELTASONE) 5 MG Tab Take 4 Tablets by mouth every day for 7 days, THEN 3 Tablets every day for 7 days, THEN 2 Tablets every day for 7 days, THEN 1 Tablet every day for 7 days. 70 Tablet 0     No current facility-administered medications for this visit.       ALLERGIES:   No Known Allergies    IMMUNIZATIONS:  Immunization History   Administered Date(s) Administered   • Robert SARS-CoV-2 Vaccine 07/12/2021   • Tdap Vaccine 02/22/2016       SOCIAL HISTORY:   Social History     Tobacco Use   • Smoking status: Never Smoker   • Smokeless tobacco: Never Used   Vaping Use   • Vaping Use: Never used   Substance Use Topics   • Alcohol use: Yes   • Drug use: No       FAMILY HISTORY:  Family History   Problem Relation Age of Onset   • Cancer Father         throat/prostate   • Rheumatologic Disease Paternal Aunt    • Other Paternal Aunt         MS   • Rheumatologic Disease Paternal Aunt    • Arthritis Paternal Aunt         RA        OBJECTIVE:     Vital Signs: BP (!) 134/90 (BP Location: Left arm, Patient Position: Sitting, BP Cuff Size: Adult)   Pulse 100   Temp 37 °C (98.6 °F) (Temporal)   Resp 16   Ht 1.778 m (5' 10\")   Wt 69.2 kg (152 lb 9.6 oz)   SpO2 98% Body mass index is " 21.9 kg/m².    General: Appears well and comfortable  Eyes: No scleral or conjunctival lesions  ENT: No visible oral or nasal lesions  Head/Neck: No visible scalp or neck lesions  Cardiovascular: Regular rate and rhythm  Respiratory: Breathing quiet and unlabored  Gastrointestinal: No organomegaly or abdominal masses  Integumentary: No significant cutaneous lesions or discolorations  Musculoskeletal: Reducible ulnar deviation at MCPs with synovial hypertrophy (left much worse than right); restricted range of motion of wrists; no significant joint tenderness, warmth, or erythema  Neurologic: No focal sensory or motor deficits  Psychiatric: Mood and affect appropriate      LABORATORY RESULTS REVIEWED AND INTERPRETED BY ME:  Lab Results   Component Value Date/Time    SEDRATEWES 18 03/09/2021 09:56 AM    CREACTPROT 1.31 (H) 03/09/2021 09:56 AM    URICACID 3.5 11/30/2011 01:40 PM     Lab Results   Component Value Date/Time    RHEUMFACTN >360 (H) 11/30/2017 02:32 PM    CCPANTIBODY 188 (H) 11/30/2017 02:32 PM     Lab Results   Component Value Date/Time    ANTINUCAB Detected (A) 11/30/2017 02:32 PM     Lab Results   Component Value Date/Time    MICROSOMALA 244.3 (H) 11/30/2017 02:32 PM    TSHULTRASEN <0.005 (L) 11/30/2018 11:00 AM    FREET4 1.92 (H) 11/30/2018 11:00 AM     Lab Results   Component Value Date/Time    HEPBSAG Negative 12/30/2013 02:18 PM    HEPBCORTOT Negative 12/30/2013 02:18 PM    HEPCAB Negative 12/30/2013 02:18 PM     Lab Results   Component Value Date/Time    IGGLYMEABS 0.54 11/30/2011 01:40 PM     Lab Results   Component Value Date/Time    ASTSGOT 18 03/09/2021 09:56 AM    ALTSGPT 12 03/09/2021 09:56 AM    ALKPHOSPHAT 98 03/09/2021 09:56 AM    TBILIRUBIN 0.6 03/09/2021 09:56 AM    TOTPROTEIN 7.6 03/09/2021 09:56 AM    ALBUMIN 3.7 03/09/2021 09:56 AM     Lab Results   Component Value Date/Time    SODIUM 138 03/09/2021 09:56 AM    POTASSIUM 3.7 03/09/2021 09:56 AM    CHLORIDE 103 03/09/2021 09:56 AM    CO2  25 03/09/2021 09:56 AM    GLUCOSE 96 03/09/2021 09:56 AM    BUN 10 03/09/2021 09:56 AM    CREATININE 0.35 (L) 03/09/2021 09:56 AM    CALCIUM 9.6 03/09/2021 09:56 AM     Lab Results   Component Value Date/Time    WBC 7.3 03/09/2021 09:56 AM    RBC 5.92 (H) 03/09/2021 09:56 AM    HEMOGLOBIN 14.0 03/09/2021 09:56 AM    HEMATOCRIT 46.0 03/09/2021 09:56 AM    MCV 77.7 (L) 03/09/2021 09:56 AM    MCH 23.6 (L) 03/09/2021 09:56 AM    MCHC 30.4 (L) 03/09/2021 09:56 AM    RDW 45.5 03/09/2021 09:56 AM    PLATELETCT 473 (H) 03/09/2021 09:56 AM    MPV 11.0 03/09/2021 09:56 AM    NEUTS 4.25 03/09/2021 09:56 AM    POLYS 100 02/21/2014 10:00 AM    LYMPHOCYTES 32.50 03/09/2021 09:56 AM    MONOCYTES 6.90 03/09/2021 09:56 AM    EOSINOPHILS 1.50 03/09/2021 09:56 AM    BASOPHILS 0.50 03/09/2021 09:56 AM    HYPOCHROMIA 1+ 12/30/2013 02:18 PM    ANISOCYTOSIS 1+ 11/05/2013 01:08 PM     Lab Results   Component Value Date/Time    25HYDROXY 13 (L) 12/30/2013 02:18 PM     Lab Results   Component Value Date/Time    COLORURINE Yellow 11/30/2018 03:30 PM    SPECGRAVITY 1.008 11/30/2018 03:30 PM    PHURINE 5.5 11/30/2018 03:30 PM    GLUCOSEUR Negative 11/30/2018 03:30 PM    KETONES 40 (A) 11/30/2018 03:30 PM    PROTEINURIN Negative 11/30/2018 03:30 PM     Lab Results   Component Value Date/Time    FLTYPE Synovial 02/21/2014 10:00 AM       RADIOLOGY RESULTS REVIEWED AND INTERPRETED BY ME:  Results for orders placed during the hospital encounter of 12/30/13    DX-KNEES-AP BILATERAL STANDING    Impression  Unremarkable examination.    Results for orders placed in visit on 04/13/22    DX-FOOT-COMPLETE 3+ LEFT    Results for orders placed during the hospital encounter of 12/30/13    DX-ANKLE 2- VIEWS    Impression  Apparent ankle joint effusion without evidence of bony abnormality.    Results for orders placed during the hospital encounter of 12/30/13    DX-JOINT SURVEY-FEET SINGLE VIEW    Impression  Bilateral bony erosions consistent with history of  rheumatoid arthritis.    Results for orders placed during the hospital encounter of 12/30/13    DX-JOINT SURVEY-HANDS SINGLE VIEW    Impression  Apparent joint space narrowing and bony erosion involving the right fifth metacarpal head.    Results for orders placed in visit on 11/30/15    MR-LUMBAR SPINE-W/O    Impression  Mild degenerative disease in the lumbar spine as described above.    Results for orders placed in visit on 11/30/15    MR-CERVICAL SPINE-W/O    Impression  1.  There is mild disc bulge at C3-4 without significant spinal or neural foraminal stenosis.  2.  Mild generalized thyroid enlargement.      All relevant laboratory and imaging results reported on this note were reviewed and interpreted by me.      ASSESSMENT AND PLAN:     Isabell Serrano is a 47 y.o. female with history as noted above whose presentation merits the following diagnostic and clinical status impressions and recommendations:    1. Seropositive erosive rheumatoid arthritis (positive RF and ACPA)  Clinical picture suggests relatively low disease activity though the present absence of significant symptoms is likely due to the prednisone taper. In any case, given the 2-month delay in initiation of Enbrel as previously ordered, would initiate triple therapy at this point (i.e. addition of methotrexate and sulfasalazine) as noted below to prevent possible flare after she is completely off of the prednisone. If the Enbrel eventually gets approved, then okay to discontinue at least two of these medications.  - Continue hydroxychloroquine (PLAQUENIL) 200 MG Tab; Take 200 mg by mouth every day.  - Methotrexate, PF, 10 MG/0.2ML Solution Auto-injector; Inject 15 mg under the skin every 7 days for 30 days.  Dispense: 1.6 mL; Refill: 5  - SulfaSALAzine (AZULFIDINE EN-TAB) 500 MG EC tablet; Take 1 Tablet by mouth 2 times a day with meals.  Dispense: 60 Tablet; Refill: 5  - Folic acid (FOLVITE) 1 MG Tab; Take 1 Tablet by mouth every day for  90 days.  Dispense: 90 Tablet; Refill: 3    2. High risk medication use  Counseled on the potential adverse effects of long-term methotrexate and sulfasalazine use, the need to avoid regular alcohol intake, and the need for routine monitoring labs.  - Need routine monitoring labs initially after 1-2 months, then every 3-4 months thereafter    3. Long-term use of hydroxychloroquine  Risk of retinal toxicity is considered minimal in this case given the low dose of hydroxychloroquine prescribed (less than 5 mg/kg of body weight) per rheumatology/ophthalmology guidelines.  - Follow up with ophthalmology as previously referred      FOLLOW-UP: Return in about 2 months (around 8/13/2022).           Thank you for giving me the opportunity to participate in the care of Isabell Serrano.    Nick Multani MD, MS  Rheumatologist, [facility] - Arthritis [facility]  , Department of Internal Medicine  [facility]

## 2022-06-13 NOTE — PATIENT INSTRUCTIONS
AFTER VISIT INSTRUCTIONS    Below are important information to help you navigate your healthcare needs and help us serve you safely and effectively:  If laboratory tests and/or imaging studies were ordered, remember to go get them done.  If new prescriptions or refills were sent to the pharmacy, remember to go pick them up.  Take your medications exactly as prescribed unless instructed otherwise.  Follow up with your primary care provider and/or specialists as scheduled.  If there are significant findings from your lab tests and imaging studies, I will notify you with explanations via Visualnett or phone call, otherwise you can view them on Curiously and let me know if you have any questions.  Sign up for Curiously if you have not already done so, in order to have access to the results of your lab tests and imaging studies, and to be able to send and receive messages from me.  Note that Curiously messaging is for non-urgent matters that do not require immediate attention and are typically read only during office hours, so do not expect immediate responses from me.

## 2022-06-14 LAB
GAMMA INTERFERON BACKGROUND BLD IA-ACNC: 0.03 IU/ML
M TB IFN-G BLD-IMP: NEGATIVE
M TB IFN-G CD4+ BCKGRND COR BLD-ACNC: 0.01 IU/ML
MITOGEN IGNF BCKGRD COR BLD-ACNC: >10 IU/ML
QFT TB2 - NIL TBQ2: 0.01 IU/ML

## 2022-06-15 ENCOUNTER — TELEPHONE (OUTPATIENT)
Dept: RHEUMATOLOGY | Facility: MEDICAL CENTER | Age: 47
End: 2022-06-15
Payer: COMMERCIAL

## 2022-06-15 DIAGNOSIS — M05.80 SEROPOSITIVE EROSIVE RHEUMATOID ARTHRITIS (HCC): ICD-10-CM

## 2022-06-15 NOTE — TELEPHONE ENCOUNTER
Spoke with patient and advised her of approval. She would prefer to start Enbrel instead of the MTX. I advised her to call [facility] to enroll and check with them how long the process is expected to take after they receive the RX from our office. Asked to call us with any questions

## 2022-06-15 NOTE — TELEPHONE ENCOUNTER
Enbrel medication approved by patients insurance and new RX needs to be sent to [facility] pharmacy. Updated in Patients chart

## 2022-06-16 NOTE — TELEPHONE ENCOUNTER
Enbrel Rx sent. Let patient know she can stop taking methotrexate and sulfasalazine after she starts taking Enbrel.    NEEMA

## 2022-06-27 DIAGNOSIS — M05.80 SEROPOSITIVE EROSIVE RHEUMATOID ARTHRITIS (HCC): ICD-10-CM

## 2022-06-27 NOTE — TELEPHONE ENCOUNTER
I spoke with patient her Enbrel will be delivered this Wednesday 6/29 and she will receive training. Patient due to insurance [employer] was unable to start Methotrexate -you already have a [place] message on this as well. Patient would like prednisone refill until the Enbrel starts working. Patient reports terrible pain at this time.

## 2022-06-28 RX ORDER — PREDNISONE 10 MG/1
TABLET ORAL
Qty: 74 TABLET | Refills: 0 | Status: SHIPPED | OUTPATIENT
Start: 2022-06-28 | End: 2022-08-09

## 2022-06-28 NOTE — TELEPHONE ENCOUNTER
Patient  walked into clinic upset and frustrated  asking for refill of prednisone to be sent in ASAP  Explained to patient this is already pending for review and approval since our return to clinic 6/27-Patient previously sent mychart message on 6/22 when provider was out of the clinic.   reports patient is unable to move at this time and needs prednisone while waiting for start of Enbrel   Patient did not start Methotrexate because of insurance and pharmacy delay injectable rx was transferred by local pharmacy to be dispensed by her mail order.

## 2022-08-25 ENCOUNTER — OFFICE VISIT (OUTPATIENT)
Dept: RHEUMATOLOGY | Facility: MEDICAL CENTER | Age: 47
End: 2022-08-25
Attending: STUDENT IN AN ORGANIZED HEALTH CARE EDUCATION/TRAINING PROGRAM
Payer: COMMERCIAL

## 2022-08-25 VITALS
DIASTOLIC BLOOD PRESSURE: 70 MMHG | HEART RATE: 85 BPM | SYSTOLIC BLOOD PRESSURE: 112 MMHG | RESPIRATION RATE: 16 BRPM | WEIGHT: 156.4 LBS | HEIGHT: 70 IN | TEMPERATURE: 98.7 F | OXYGEN SATURATION: 99 % | BODY MASS INDEX: 22.39 KG/M2

## 2022-08-25 DIAGNOSIS — M05.80 SEROPOSITIVE EROSIVE RHEUMATOID ARTHRITIS (HCC): ICD-10-CM

## 2022-08-25 DIAGNOSIS — Z79.899 HIGH RISK MEDICATION USE: ICD-10-CM

## 2022-08-25 DIAGNOSIS — Z79.899 LONG-TERM USE OF HYDROXYCHLOROQUINE: ICD-10-CM

## 2022-08-25 PROCEDURE — 99211 OFF/OP EST MAY X REQ PHY/QHP: CPT | Performed by: STUDENT IN AN ORGANIZED HEALTH CARE EDUCATION/TRAINING PROGRAM

## 2022-08-25 PROCEDURE — 99214 OFFICE O/P EST MOD 30 MIN: CPT | Performed by: STUDENT IN AN ORGANIZED HEALTH CARE EDUCATION/TRAINING PROGRAM

## 2022-08-25 ASSESSMENT — FIBROSIS 4 INDEX: FIB4 SCORE: 0.49

## 2022-08-25 NOTE — PATIENT INSTRUCTIONS
AFTER VISIT INSTRUCTIONS    Below are important information to help you navigate your healthcare needs and help us serve you safely and effectively:  If laboratory tests and/or imaging studies were ordered, remember to go get them done.  If new prescriptions or refills were sent to the pharmacy, remember to go pick them up.  Take your medications exactly as prescribed unless instructed otherwise.  If there are significant findings on your lab tests and imaging studies that warrant further action, I will notify you with explanations via TidePoolhart or phone call, otherwise you can view them on [email or web address] and let me know if you have any questions.  Sign up for Hidden Radio if you have not already done so, in order to have access to the results of your lab tests and imaging studies, and to be able to send and receive messages from me.  Note that Hidden Radio messages are typically read during office hours only and may take 1-7 days before a response depending on the urgency of the situation and how busy my schedule is.  In general, Hidden Radio messaging is for non-urgent matters that do not require immediate attention, so for urgent matters that cannot wait, you are advised to go to an urgent care.

## 2022-08-25 NOTE — PROGRESS NOTES
[facility] ? [facility]  [address], [address], [place] [address]  Phone: [phone] ? Fax: [phone]    RHEUMATOLOGY FOLLOW-UP VISIT NOTE      DATE OF SERVICE: 08/25/2022    PRIMARY CARE PROVIDER:  Jenniffer Bermudez M.D.  [address] [place] [id number]    MEDICAL RECORD NUMBER:  [id number]    LAST CLINIC VISIT:  Visit date not found      SUBJECTIVE:     CHIEF COMPLAINT:   Chief Complaint   Patient presents with    Follow-Up     Rheumatoid Arthritis        RHEUMATOLOGIC HISTORY:  Isabell Serrano is a 47 y.o. female with pertinent history notable for seropositive erosive rheumatoid arthritis (positive RF and ACPA) diagnosed in 2010 but symptomatic since 2007, secondary osteoarthritis status post foot/ankle arthroscopies, and presumed  Hashimoto's thyroiditis (based on positive anti-TPO). Previously under the care of a local rheumatologist, she initially presented on 4/12/22 for transfer of care, reported joint pain in her hands (mostly MCPs and PIPs), wrists, shoulders, ankles, and feet, associated with over 1 hour of morning stiffness that improves with activity.     Pertinent treatment history as of 8/2022: Methotrexate (stopped due to intolerable GI side effects), leflunomide (stopped in 2/2022 due to ineffectiveness), hydroxychloroquine (resumed in 4/2022 after being stopped for some time for unclear reasons), Medrol 4mg (from 2014/2015 to 4/2022 as it was tapered off with initiation of prednisone 4 mg taper followed by 20 mg taper), and Enbrel (7/2022-present).    Pertinent labs as of 3-6/2021: Strongly positive RF >360 and ACPA 188, positive PAPO 1:320 and anti-.3 (in 11/2017), elevated CRP 1.31 with normal ESR, microcytosis MCV 77.7 without anemia; negative QTB, HBV, and unremarkable CMP.    INTERVAL HISTORY:  Reports mild pain/stiffness in right wrist with swelling and both ankles with swelling for over a month.  States that Enbrel has been very  helpful but did not notice much difference with Plaquenil.  Did not complete the last course of prednisone 30 mg taper as she was already feeling better on Enbrel.    REVIEW OF SYSTEMS:  Except as noted in the history above, a complete review of systems with emphasis on autoimmune inflammatory conditions was otherwise negative for any significant symptoms.      ACTIVE PROBLEM LIST:  Patient Active Problem List   Diagnosis    Rheumatoid arthritis of hand (HCC)    Seropositive erosive rheumatoid arthritis (positive RF and ACPA)    H/O breast augmentation    Other thyrotoxicosis with thyrotoxic crisis or storm    S/P  section    Acute post-operative pain    Acute blood loss as cause of postoperative anemia    Hammertoe of left foot    Foot pain, left    High risk medication use    Long-term use of hydroxychloroquine    Positive PAPO (antinuclear antibody)    Long term current use of systemic steroids   No problem-specific Assessment & Plan notes found for this encounter.      PAST MEDICAL HISTORY:  Past Medical History:   Diagnosis Date    Arthritis     Arthritis, rheumatoid (HCC)     Disorder of thyroid     hyperthyroid    RA (rheumatoid arthritis) (HCC) 2013       PAST SURGICAL HISTORY:  Past Surgical History:   Procedure Laterality Date    PB FUSION BIG TOE,MT-P JT Left 3/2/2022    Procedure: LEFT FOOT FIRST METATARSOPHALANGEAL JOINT FUSION, LEFT RHEUMATOID FOREFOOT RECONSTRUCTION, REPAIRS AS INDICATED.;  Surgeon: Roby Espinosa M.D.;  Location: [facility];  Service: Orthopedics    TOE FUSION Right 2019    Procedure: FUSION, JOINT, TOE - FIRST METATARSOPHALANGEAL JOINT;  Surgeon: Roby Espinosa M.D.;  Location: [facility];  Service: Orthopedics    FOOT RECONSTRUCTION RHEUMATIC Right 2019    Procedure: RECONSTRUCTION, FOOT, FOR RHEUMATOID ARTHRITIS - FOREFOOT;  Surgeon: Roby Espinosa M.D.;  Location: [facility];  Service: Orthopedics     "ANKLE ARTHROSCOPY Right 12/16/2019    Procedure: ARTHROSCOPY, ANKLE;  Surgeon: Roby Espinosa M.D.;  Location: [facility];  Service: Orthopedics    PRIMARY C SECTION  1/7/2019    Procedure: PRIMARY C SECTION;  Surgeon: Carlee Long M.D.;  Location: LABOR AND DELIVERY;  Service: Labor and Delivery    MAMMOPLASTY AUGMENTATION  7/12/2011    Performed by MARIBELL GONZALES at SURGERY [facility]       MEDICATIONS:  Current Outpatient Medications   Medication Sig Dispense Refill    Etanercept 50 MG/ML Solution Auto-injector Inject 50 mg under the skin every 7 days. 4.2 mL 5    hydroxychloroquine (PLAQUENIL) 200 MG Tab Take 200 mg by mouth every day.      sulfaSALAzine (AZULFIDINE EN-TAB) 500 MG EC tablet Take 1 Tablet by mouth 2 times a day with meals. (Patient not taking: Reported on 8/25/2022) 60 Tablet 5    folic acid (FOLVITE) 1 MG Tab Take 1 Tablet by mouth every day for 90 days. (Patient not taking: Reported on 8/25/2022) 90 Tablet 3     No current facility-administered medications for this visit.       ALLERGIES:   No Known Allergies    IMMUNIZATIONS:  Immunization History   Administered Date(s) Administered    Robert SARS-CoV-2 Vaccine 07/12/2021    Tdap Vaccine 02/22/2016       SOCIAL HISTORY:   Social History     Tobacco Use    Smoking status: Never    Smokeless tobacco: Never   Vaping Use    Vaping Use: Never used   Substance Use Topics    Alcohol use: Yes    Drug use: No       FAMILY HISTORY:  Family History   Problem Relation Age of Onset    Cancer Father         throat/prostate    Rheumatologic Disease Paternal Aunt     Other Paternal Aunt         MS    Rheumatologic Disease Paternal Aunt     Arthritis Paternal Aunt         RA        OBJECTIVE:     Vital Signs: /70 (BP Location: Left arm, Patient Position: Sitting, BP Cuff Size: Adult)   Pulse 85   Temp 37.1 °C (98.7 °F) (Temporal)   Resp 16   Ht 1.778 m (5' 10\")   Wt 70.9 kg (156 lb 6.4 oz)   SpO2 99% Body mass index " is 22.44 kg/m².    General: Appears well and comfortable  Eyes: No scleral or conjunctival lesions  ENT: No apparent oral or nasal lesions  Head/Neck: No apparent scalp or neck lesions  Cardiovascular: Regular rate and rhythm  Respiratory: Breathing quiet and unlabored  Gastrointestinal: No organomegaly or abdominal masses  Integumentary: No significant cutaneous lesions or discolorations  Musculoskeletal: Minimal tenderness of right wrist with swelling and left ankle with swelling; reducible ulnar deviation at MCPs with synovial hypertrophy (left much worse than right); restricted range of motion of wrists  Neurologic: No focal sensory or motor deficits  Psychiatric: Mood and affect appropriate      LABORATORY RESULTS REVIEWED AND INTERPRETED BY ME:  Lab Results   Component Value Date/Time    SEDRATEWES 18 03/09/2021 09:56 AM    CREACTPROT 1.31 (H) 03/09/2021 09:56 AM    URICACID 3.5 11/30/2011 01:40 PM     Lab Results   Component Value Date/Time    RHEUMFACTN >360 (H) 11/30/2017 02:32 PM    CCPANTIBODY 188 (H) 11/30/2017 02:32 PM     Lab Results   Component Value Date/Time    ANTINUCAB Detected (A) 11/30/2017 02:32 PM     Lab Results   Component Value Date/Time    MICROSOMALA 244.3 (H) 11/30/2017 02:32 PM    TSHULTRASEN <0.005 (L) 11/30/2018 11:00 AM    FREET4 1.92 (H) 11/30/2018 11:00 AM     Lab Results   Component Value Date/Time    HEPBSAG Non-Reactive 06/13/2022 10:21 AM    HEPBCORTOT Negative 12/30/2013 02:18 PM    HEPCAB Negative 12/30/2013 02:18 PM     Lab Results   Component Value Date/Time    IGGLYMEABS 0.54 11/30/2011 01:40 PM     Lab Results   Component Value Date/Time    ASTSGOT 18 03/09/2021 09:56 AM    ALTSGPT 12 03/09/2021 09:56 AM    ALKPHOSPHAT 98 03/09/2021 09:56 AM    TBILIRUBIN 0.6 03/09/2021 09:56 AM    TOTPROTEIN 7.6 03/09/2021 09:56 AM    ALBUMIN 3.7 03/09/2021 09:56 AM     Lab Results   Component Value Date/Time    SODIUM 138 03/09/2021 09:56 AM    POTASSIUM 3.7 03/09/2021 09:56 AM     CHLORIDE 103 03/09/2021 09:56 AM    CO2 25 03/09/2021 09:56 AM    GLUCOSE 96 03/09/2021 09:56 AM    BUN 10 03/09/2021 09:56 AM    CREATININE 0.35 (L) 03/09/2021 09:56 AM    CALCIUM 9.6 03/09/2021 09:56 AM     Lab Results   Component Value Date/Time    WBC 7.3 03/09/2021 09:56 AM    RBC 5.92 (H) 03/09/2021 09:56 AM    HEMOGLOBIN 14.0 03/09/2021 09:56 AM    HEMATOCRIT 46.0 03/09/2021 09:56 AM    MCV 77.7 (L) 03/09/2021 09:56 AM    MCH 23.6 (L) 03/09/2021 09:56 AM    MCHC 30.4 (L) 03/09/2021 09:56 AM    RDW 45.5 03/09/2021 09:56 AM    PLATELETCT 473 (H) 03/09/2021 09:56 AM    MPV 11.0 03/09/2021 09:56 AM    NEUTS 4.25 03/09/2021 09:56 AM    POLYS 100 02/21/2014 10:00 AM    LYMPHOCYTES 32.50 03/09/2021 09:56 AM    MONOCYTES 6.90 03/09/2021 09:56 AM    EOSINOPHILS 1.50 03/09/2021 09:56 AM    BASOPHILS 0.50 03/09/2021 09:56 AM    HYPOCHROMIA 1+ 12/30/2013 02:18 PM    ANISOCYTOSIS 1+ 11/05/2013 01:08 PM     Lab Results   Component Value Date/Time    25HYDROXY 13 (L) 12/30/2013 02:18 PM     Lab Results   Component Value Date/Time    COLORURINE Yellow 11/30/2018 03:30 PM    SPECGRAVITY 1.008 11/30/2018 03:30 PM    PHURINE 5.5 11/30/2018 03:30 PM    GLUCOSEUR Negative 11/30/2018 03:30 PM    KETONES 40 (A) 11/30/2018 03:30 PM    PROTEINURIN Negative 11/30/2018 03:30 PM     Lab Results   Component Value Date/Time    FLTYPE Synovial 02/21/2014 10:00 AM       RADIOLOGY RESULTS REVIEWED AND INTERPRETED BY ME:  Results for orders placed during the hospital encounter of 12/30/13    DX-KNEES-AP BILATERAL STANDING    Impression  Unremarkable examination.    Results for orders placed in visit on 06/30/22    DX-FOOT-COMPLETE 3+ LEFT    Results for orders placed during the hospital encounter of 12/30/13    DX-ANKLE 2- VIEWS    Impression  Apparent ankle joint effusion without evidence of bony abnormality.    Results for orders placed during the hospital encounter of 12/30/13    DX-JOINT SURVEY-FEET SINGLE VIEW    Impression  Bilateral  bony erosions consistent with history of rheumatoid arthritis.    Results for orders placed during the hospital encounter of 12/30/13    DX-JOINT SURVEY-HANDS SINGLE VIEW    Impression  Apparent joint space narrowing and bony erosion involving the right fifth metacarpal head.    Results for orders placed in visit on 11/30/15    MR-LUMBAR SPINE-W/O    Impression  Mild degenerative disease in the lumbar spine as described above.    Results for orders placed in visit on 11/30/15    MR-CERVICAL SPINE-W/O    Impression  1.  There is mild disc bulge at C3-4 without significant spinal or neural foraminal stenosis.  2.  Mild generalized thyroid enlargement.      All relevant laboratory and imaging results reported on this note were reviewed and interpreted by me.      ASSESSMENT AND PLAN:     Isabell Serrano is a 47 y.o. female with history as noted above whose presentation merits the following diagnostic and clinical status impressions and recommendations:    1. Seropositive erosive rheumatoid arthritis (positive RF and ACPA)  Clinical picture suggests relatively low disease activity reasonably controlled on the current regimen, so no need for escalation of treatment at this time. However, in the context of her reported lingering mild pain/swelling of her right wrist and bilateral ankles, reasonable to check laboratory markers of disease activity for complete assessment and decide on the need for steroid injection.  - Sed Rate  - CRP QUANTITIVE (NON-CARDIAC)  - Continue Enbrel 50 mg SC weekly  - Continue hydroxychloroquine 200 mg daily    2. High risk medication use  No current history, physical findings, or laboratory evidence to suggest significant adverse drug effects or opportunistic infections.  - Need to confirm/address age-appropriate vaccines    3. Long-term use of hydroxychloroquine  Risk of retinal toxicity is considered minimal in this case given the low dose of hydroxychloroquine prescribed (less than 5  mg/kg of body weight) per rheumatology/ophthalmology guidelines. However, ophthalmologic evaluation is still recommended with the frequency of routine follow-up eye exams determined by the ophthalmologist, typically annually or every other year.  - Follow up with ophthalmology as recommended      FOLLOW-UP: Return for right wrist and left ankle injections.           Thank you for giving me the opportunity to participate in the care of Isabell Serrano.    Nick Multani MD, MS  Rheumatologist, [facility] ? [facility]  , Department of Internal Medicine  [facility] ? [facility]

## 2022-09-01 ENCOUNTER — APPOINTMENT (OUTPATIENT)
Dept: RHEUMATOLOGY | Facility: MEDICAL CENTER | Age: 47
End: 2022-09-01
Attending: STUDENT IN AN ORGANIZED HEALTH CARE EDUCATION/TRAINING PROGRAM
Payer: COMMERCIAL

## 2022-09-16 PROBLEM — M21.612 BUNION, LEFT FOOT: Status: ACTIVE | Noted: 2022-09-16

## 2023-01-26 ENCOUNTER — APPOINTMENT (RX ONLY)
Dept: URBAN - METROPOLITAN AREA CLINIC 6 | Facility: CLINIC | Age: 48
Setting detail: DERMATOLOGY
End: 2023-01-26

## 2023-01-26 DIAGNOSIS — L27.0 GENERALIZED SKIN ERUPTION DUE TO DRUGS AND MEDICAMENTS TAKEN INTERNALLY: ICD-10-CM

## 2023-01-26 DIAGNOSIS — L28.0 LICHEN SIMPLEX CHRONICUS: ICD-10-CM

## 2023-01-26 PROCEDURE — ? PRESCRIPTION MEDICATION MANAGEMENT

## 2023-01-26 PROCEDURE — ? PRESCRIPTION

## 2023-01-26 PROCEDURE — 99203 OFFICE O/P NEW LOW 30 MIN: CPT

## 2023-01-26 PROCEDURE — ? DIAGNOSIS COMMENT

## 2023-01-26 PROCEDURE — ? COUNSELING

## 2023-01-26 RX ORDER — CLOBETASOL PROPIONATE 0.5 MG/G
CREAM TOPICAL BID
Qty: 60 | Refills: 3 | Status: ERX | COMMUNITY
Start: 2023-01-26

## 2023-01-26 RX ORDER — TACROLIMUS 1 MG/G
1 APPLICATION OINTMENT TOPICAL BID
Qty: 60 | Refills: 3 | Status: ERX | COMMUNITY
Start: 2023-01-26

## 2023-01-26 RX ADMIN — CLOBETASOL PROPIONATE: 0.5 CREAM TOPICAL at 00:00

## 2023-01-26 RX ADMIN — TACROLIMUS 1 APPLICATION: 1 OINTMENT TOPICAL at 00:00

## 2023-01-26 ASSESSMENT — LOCATION ZONE DERM
LOCATION ZONE: TRUNK
LOCATION ZONE: FEET
LOCATION ZONE: LEG

## 2023-01-26 ASSESSMENT — LOCATION SIMPLE DESCRIPTION DERM
LOCATION SIMPLE: RIGHT PLANTAR SURFACE
LOCATION SIMPLE: LEFT PRETIBIAL REGION
LOCATION SIMPLE: UPPER BACK
LOCATION SIMPLE: RIGHT PRETIBIAL REGION
LOCATION SIMPLE: ABDOMEN

## 2023-01-26 ASSESSMENT — LOCATION DETAILED DESCRIPTION DERM
LOCATION DETAILED: LEFT DISTAL PRETIBIAL REGION
LOCATION DETAILED: RIGHT MEDIAL PLANTAR MIDFOOT
LOCATION DETAILED: PERIUMBILICAL SKIN
LOCATION DETAILED: INFERIOR THORACIC SPINE
LOCATION DETAILED: RIGHT PROXIMAL PRETIBIAL REGION

## 2023-01-26 NOTE — PROCEDURE: DIAGNOSIS COMMENT
Render Risk Assessment In Note?: no
Detail Level: Simple
Comment: Started Enbrel injections for Rheumatoid Arthritis in September, rash began shortly afterwards. \\Pooja has had RA for many years and this is the first biologic medication she’s tried. She states it is working well for the RA and is hesitant to stop it. \\Pooja will follow up with her rheumatologist for discussion of alternative medication. \\nWill prescribe Amitriptyline 5%, Gabapentin 10%, Lidocaine 5% Cream through [facility] to use as needed for itching.

## 2023-01-26 NOTE — PROCEDURE: PRESCRIPTION MEDICATION MANAGEMENT
Initiate Treatment: Clobetasol 0.05% cream twice daily x 2 weeks, alternating with Protopic 0.1% ointment twice daily x 1 week.\\nAntihistamine once daily for itch.
Detail Level: Zone
Render In Strict Bullet Format?: No

## 2023-02-21 ENCOUNTER — OFFICE VISIT (OUTPATIENT)
Dept: URGENT CARE | Facility: CLINIC | Age: 48
End: 2023-02-21
Payer: COMMERCIAL

## 2023-02-21 VITALS
DIASTOLIC BLOOD PRESSURE: 60 MMHG | TEMPERATURE: 98.2 F | OXYGEN SATURATION: 98 % | SYSTOLIC BLOOD PRESSURE: 110 MMHG | WEIGHT: 145 LBS | RESPIRATION RATE: 20 BRPM | HEART RATE: 94 BPM | BODY MASS INDEX: 20.76 KG/M2 | HEIGHT: 70 IN

## 2023-02-21 DIAGNOSIS — M06.9 RHEUMATOID ARTHRITIS OF HAND, UNSPECIFIED LATERALITY, UNSPECIFIED WHETHER RHEUMATOID FACTOR PRESENT (HCC): ICD-10-CM

## 2023-02-21 DIAGNOSIS — R21 RASH AND NONSPECIFIC SKIN ERUPTION: ICD-10-CM

## 2023-02-21 PROCEDURE — 99203 OFFICE O/P NEW LOW 30 MIN: CPT | Performed by: PHYSICIAN ASSISTANT

## 2023-02-21 RX ORDER — PREDNISONE 20 MG/1
TABLET ORAL
Qty: 10 TABLET | Refills: 0 | Status: SHIPPED | OUTPATIENT
Start: 2023-02-21 | End: 2023-08-04

## 2023-02-21 ASSESSMENT — FIBROSIS 4 INDEX: FIB4 SCORE: 0.51

## 2023-02-21 NOTE — PROGRESS NOTES
Subjective:   Isabell Serrano is a 48 y.o. female who presents for Rash (Rash all over body, Rt foot: dx with staph, feels hot)  Patient presents with whole body rash which first developed around January 18.  She has a history significant for foot surgery.  She states the rash was noted just prior to her second foot surgery which was January 18, 2023.  The rash was cultured around the site of incision and grew out Staph aureus.  She was started on Bactrim by Dr. Espinosa at that time.  Since then her rash has progressed.  She did see dermatology and was started on topical steroids which have been ineffective.  She reports whole body involvement associated with severe pruritus.  She denies any new soaps medications lotions or antibiotics.  No foreign travel.  No new pets.  No associated shortness of breath or oropharyngeal swelling.  No tongue swelling.  Does have a history of rheumatoid arthritis.  Did start Enbrel last year in September.  Rash did not develop for approximately 4 months so uncertain if this is related.  No associated fevers or chills.          Rash  Past treatments include antihistamine, antibiotics and topical steroids.       Review of Systems   Skin:  Positive for rash.     Medications:  acetaminophen Tabs  Enbrel SureClick Soaj  gabapentin Caps  hydroxychloroquine Tabs  hydrOXYzine pamoate Caps  sulfamethoxazole-trimethoprim    Allergies:             Patient has no known allergies.    Surgical History:         Past Surgical History:   Procedure Laterality Date    PB FUSION BIG TOE,MT-P JT Left 1/18/2023    Procedure: LEFT REVISION FIRST METATARSALPHALANGEAL JOINT FUSION;  Surgeon: Roby Espinosa M.D.;  Location: [facility] [place];  Service: Orthopedics    PB REMV BONE FOR GRAFT MINOR Left 1/18/2023    Procedure: LEFT CALCANEAL BONE GRAFT;  Surgeon: Roby Espinosa M.D.;  Location: [facility];  Service: Orthopedics    PB REMOVAL DEEP IMPLANT Left 1/18/2023     Procedure: LEFT FIRST METATARSALPHALANGEAL JOINT HARDWARE REMOVAL;  Surgeon: Roby Espinosa M.D.;  Location: [facility];  Service: Orthopedics    PB FUSION BIG TOE,MT-P JT Left 3/2/2022    Procedure: LEFT FOOT FIRST METATARSOPHALANGEAL JOINT FUSION, LEFT RHEUMATOID FOREFOOT RECONSTRUCTION, REPAIRS AS INDICATED.;  Surgeon: Roby Espinosa M.D.;  Location: [facility];  Service: Orthopedics    TOE FUSION Right 12/16/2019    Procedure: FUSION, JOINT, TOE - FIRST METATARSOPHALANGEAL JOINT;  Surgeon: Roby Espinosa M.D.;  Location: SURGERY [facility];  Service: Orthopedics    FOOT RECONSTRUCTION RHEUMATIC Right 12/16/2019    Procedure: RECONSTRUCTION, FOOT, FOR RHEUMATOID ARTHRITIS - FOREFOOT;  Surgeon: Roby Espinosa M.D.;  Location: SURGERY [facility];  Service: Orthopedics    ANKLE ARTHROSCOPY Right 12/16/2019    Procedure: ARTHROSCOPY, ANKLE;  Surgeon: Roby Espinosa M.D.;  Location: SURGERY [facility];  Service: Orthopedics    PRIMARY C SECTION  1/7/2019    Procedure: PRIMARY C SECTION;  Surgeon: Carlee Long M.D.;  Location: LABOR AND DELIVERY;  Service: Labor and Delivery    MAMMOPLASTY AUGMENTATION  7/12/2011    Performed by MARIBELL GONZALES at [facility]       Past Social Hx:  Isabell Serrano  reports that she has never smoked. She has never used smokeless tobacco. She reports that she does not currently use alcohol. She reports that she does not use drugs.     Past Family Hx:   Isabell Serrano family history includes Arthritis in her paternal aunt; Cancer in her father; Other in her paternal aunt; Rheumatologic Disease in her paternal aunt and paternal aunt.       Problem list, medications, and allergies reviewed by myself today in Epic.     Objective:     /60 (BP Location: Left arm, Patient Position: Sitting, BP Cuff Size: Adult)   Pulse 94   Temp 36.8 °C (98.2 °F) (Temporal)   Resp 20   Ht 1.778 m (5'  "10\")   Wt 65.8 kg (145 lb)   SpO2 98%   BMI 20.81 kg/m²     Physical Exam  Vitals and nursing note reviewed.   Constitutional:       General: She is not in acute distress.     Appearance: Normal appearance. She is not ill-appearing, toxic-appearing or diaphoretic.   HENT:      Head: Normocephalic.      Right Ear: External ear normal.      Left Ear: External ear normal.      Nose: Nose normal.      Mouth/Throat:      Mouth: Mucous membranes are moist.   Eyes:      Extraocular Movements: Extraocular movements intact.      Conjunctiva/sclera: Conjunctivae normal.      Pupils: Pupils are equal, round, and reactive to light.   Cardiovascular:      Rate and Rhythm: Normal rate.      Pulses: Normal pulses.   Pulmonary:      Effort: Pulmonary effort is normal. No respiratory distress.   Musculoskeletal:      Cervical back: Normal range of motion.   Skin:     General: Skin is warm.      Findings: Rash present. No lesion. Rash is papular. Rash is not macular, pustular, urticarial or vesicular.      Comments: Diffuse papular rash with lichenification and excoriation over trunk abdomen back and bilateral upper and lower extremities.  Nonvesicular.  No plaque or central clearing.   Neurological:      General: No focal deficit present.      Mental Status: She is alert and oriented to person, place, and time.   Psychiatric:         Thought Content: Thought content normal.         Judgment: Judgment normal.       Assessment/Plan:     Diagnosis and Associated Orders:     1. Rash and nonspecific skin eruption  - predniSONE (DELTASONE) 20 MG Tab; Tab 2 po qd x 5 days  Dispense: 10 Tablet; Refill: 0    2. Rheumatoid arthritis of hand, unspecified laterality, unspecified whether rheumatoid factor present ([facility])        Comments/MDM:  Rash x4 weeks duration.  Recent surgery to foot although rash predates.  Does have hardware in left foot.  Being followed closely by Dr. Espinosa.  Has been on Bactrim and remains on Bactrim without " improvement.  Rash localized at onset now whole body involvement.  Significant pruritus.  Recommend course of oral prednisone.  Recommend follow-up with dermatology given diffuse nature of rash.  May be related to Enbrel although did not start at initiation of this medication do recommend follow-up with rheumatology in this regard to discuss this further.  Vital signs stable and reassuring.  May try nonsedating antihistamine during the daytime, Benadryl at night.  Avoid hot showers.  Return if symptoms worsen.  Follow-up with Dr. Espinosa as scheduled.      I personally reviewed prior external notes and test results pertinent to today's visit.  Red flags discussed as well as indications to present to the Emergency Department.  Supportive care, natural history, differential diagnoses, and indications for immediate follow-up discussed.  Patient expresses understanding and agrees to plan.  Patient denies any other questions or concerns.    Follow-up with the primary care physician for recheck, reevaluation, and consideration of further management.      Please note that this dictation was created using voice recognition software. I have made a reasonable attempt to correct obvious errors, but I expect that there are errors of grammar and possibly content that I did not discover before finalizing the note.    This note was electronically signed by Sarita Ca PA-C

## 2023-03-09 ENCOUNTER — HOSPITAL ENCOUNTER (OUTPATIENT)
Facility: MEDICAL CENTER | Age: 48
End: 2023-03-09
Attending: NURSE PRACTITIONER
Payer: COMMERCIAL

## 2023-03-09 PROCEDURE — 87186 SC STD MICRODIL/AGAR DIL: CPT

## 2023-03-09 PROCEDURE — 87077 CULTURE AEROBIC IDENTIFY: CPT

## 2023-03-09 PROCEDURE — 87529 HSV DNA AMP PROBE: CPT

## 2023-03-09 PROCEDURE — 87205 SMEAR GRAM STAIN: CPT

## 2023-03-09 PROCEDURE — 87070 CULTURE OTHR SPECIMN AEROBIC: CPT

## 2023-03-10 LAB
GRAM STN SPEC: NORMAL
SIGNIFICANT IND 70042: NORMAL
SITE SITE: NORMAL
SOURCE SOURCE: NORMAL

## 2023-03-12 LAB
BACTERIA WND AEROBE CULT: ABNORMAL
BACTERIA WND AEROBE CULT: ABNORMAL
GRAM STN SPEC: ABNORMAL
HSV1 DNA CSF QL NAA+PROBE: NOT DETECTED
HSV2 DNA CSF QL NAA+PROBE: NOT DETECTED
SIGNIFICANT IND 70042: ABNORMAL
SITE SITE: ABNORMAL
SOURCE SOURCE: ABNORMAL
SPECIMEN SOURCE: NORMAL

## 2023-04-03 ENCOUNTER — TELEPHONE (OUTPATIENT)
Dept: RHEUMATOLOGY | Facility: MEDICAL CENTER | Age: 48
End: 2023-04-03
Payer: COMMERCIAL

## 2023-04-03 NOTE — TELEPHONE ENCOUNTER
Patient called to schedule follow up visit, scheduled for next available 6/21/23, patient stated reasoning for visit is to discuss her allergic reaction to her Enbrel injection. Per patient medication is helping her but she noticed rash all over body, patient was seen with dermatologist who completed a biopsy and allergy test. Patient was found to have a staph infection, allergy test came back positive for the Enbrel medication.

## 2023-04-05 NOTE — TELEPHONE ENCOUNTER
Okay to hold Enbrel and manage with over-the-counter Advil or Aleve with Tylenol Arthritis as needed in the meantime. If possible, okay to move up her follow-up visit to a sooner date or put on wait list if available.    Nick Multani M.D.

## 2023-05-09 NOTE — CARE PLAN
Problem: Fluid Volume:  Goal: Will maintain balanced intake and output  Outcome: PROGRESSING AS EXPECTED  Patient output 1200 ml throughout shift. Encouraging fluids.     Problem: Altered physiologic condition related to postoperative  delivery  Goal: Patient physiologically stable as evidenced by normal lochia, palpable uterine involution and vital signs within normal limits  Outcome: PROGRESSING AS EXPECTED  Fundal massage per protocol.        Myasthenia gravis with acute exacerbation  Respiratory failure with hypercapnia  DM type 2  HTN  Leukocytosis  Hypernatremia    - Patient with acute MG exacerbation, could be triggered by progressive disease or other toxic/metabolic insult. No stable focal neurologic deficits to suggest cerebrovascular or other acute intracranial event. She has agreed to therapy. 5/9 - S/p PLEX 2/5 of sessions, showing some mild improvement  - Continue with PLEX for 5 sessions every other day, s/p 2 sessions. Next session is 5/10  - Hold Mestinon for now  - SoluMedrol 125mg IV daily for 5 days; following this would give prednisone 50mg PO daily and likely taper by 5mg every week until 30mg  - May need IVIG following PLEX  - PT/OT as tolerated  - Ultimately patient will need better outpatient therapy to prevent relapses, either IVIG infusions, increased prednisone dose, or Soliris (or combination of some)  - Continue to address above medical problems, as you are doing  - Will continue to follow patient with you

## 2023-07-11 NOTE — H&P (VIEW-ONLY)
Subjective   Patient ID:  Isabell Lopez is a 48 y.o. female.    Chief Complaint:    Chief Complaint   Patient presents with    Left Foot - Follow-Up    Follow-Up of the Left Foot    Last Surgery: Left Revision First Metatarsalphalangeal Joint Fusion - Left, Left Calcaneal Bone Graft - Left, and Left First Metatarsalphalangeal Joint Hardware Removal - Left on 1/18/2023      HPI  Isabell Lopez returns to clinic for a follow up on her left foot. She is status post left 1st MTPJ fusion revision with hardware removal and is doing fairly okay. She does still struggle a bit with swelling and drainage of her wound. She finds that since she has been wearing sandals, her swelling has increased. She denies any fever or chills. She denies any numbness or tingling.      ROS  Constitutional: Negative for fever, chills/sweats   Respiratory: Negative for shortness of breath, FUENTES  Cardiovascular: Negative for chest pain or pressure  GI/: Negative for diarrhea/constipation / urinary difficulty  All other systems reviewed and are negative except as per HPI.     Objective   Ortho Exam  Patient still has quite a bit of swelling but has improved since her last visit. Alignment is great. She does have an open wound that protrudes down to about a centimeter with some drainage. Strength is adequate. No gross instability. Sensation intact. Good pulses, capillary refill. Heel to toe gait.       Last Imaging Result(s):   Weightbearing left foot x-rays, done today, including AP, oblique, and lateral, reviewed by myself, demonstrating satisfactory alignment of the foot and position of internal fixation. Evidence of great bony consolidation.    Assessment & Plan   Encounter Diagnoses:   5 months status post left 1st MTPJ fusion revision with hardware removal, still swollen    We will order a CT scan of her left forefoot to rule out a nonunion.  Referral was provided for infectious disease to treat her wound.  I had a thorough  discussion with the patient regarding the diagnosis including both operative and nonoperative treatment.  After obtaining consent from the patient, we will proceed with operative management. We will plan to organize everything as outpatient. I recommended a left HWL, foot I+D, repairs as indicated. Risks of surgery include, but not limited to, wound problems, infection, nerve injury, vascular injury, need for further surgery. Risk for weakness, stiffness, blood clots, chance for reinjury, malunion, nonunion, overcorrection, undercorrection and recurrence. I discussed the risks/ benefits/ complications associated with narcotic use including the potential for addiction. All of the patient's questions were answered today. The patient understands and accepts these risks and agrees to proceed. We will schedule this in the near future at her convenience. We will try to organize everything outpatient. I will follow up with her postoperatively.    Orders Placed This Encounter    CT-FOOT W/O PLUS RECONS LEFT    Referral to Infectious Disease     Procedures   No follow-ups on file.    I, Corinna Palacios, am scribing for, and in the presence of Roby Espinosa MD.    I, Roby Espinosa MD, personally performed the services described in this documentation, as scribed by, Corinna Palacios, in my presence. I do hereby attest this information is true, accurate, and complete to the best of my knowledge.

## 2023-07-12 PROBLEM — M79.672 PAIN OF LEFT FOOT: Status: ACTIVE | Noted: 2023-07-12

## 2023-07-14 ENCOUNTER — APPOINTMENT (OUTPATIENT)
Dept: ADMISSIONS | Facility: MEDICAL CENTER | Age: 48
End: 2023-07-14
Attending: ORTHOPAEDIC SURGERY
Payer: COMMERCIAL

## 2023-07-17 ENCOUNTER — HOSPITAL ENCOUNTER (OUTPATIENT)
Dept: RADIOLOGY | Facility: MEDICAL CENTER | Age: 48
End: 2023-07-17
Attending: ORTHOPAEDIC SURGERY
Payer: COMMERCIAL

## 2023-07-17 DIAGNOSIS — M79.672 LEFT FOOT PAIN: ICD-10-CM

## 2023-07-17 DIAGNOSIS — M20.12 HALLUX VALGUS OF LEFT FOOT: ICD-10-CM

## 2023-07-17 PROCEDURE — 73700 CT LOWER EXTREMITY W/O DYE: CPT | Mod: LT

## 2023-08-04 ENCOUNTER — PRE-ADMISSION TESTING (OUTPATIENT)
Dept: ADMISSIONS | Facility: MEDICAL CENTER | Age: 48
End: 2023-08-04
Payer: COMMERCIAL

## 2023-08-07 ENCOUNTER — ANESTHESIA EVENT (OUTPATIENT)
Dept: SURGERY | Facility: MEDICAL CENTER | Age: 48
End: 2023-08-07
Payer: COMMERCIAL

## 2023-08-07 ENCOUNTER — ANESTHESIA (OUTPATIENT)
Dept: SURGERY | Facility: MEDICAL CENTER | Age: 48
End: 2023-08-07
Payer: COMMERCIAL

## 2023-08-07 ENCOUNTER — APPOINTMENT (OUTPATIENT)
Dept: RADIOLOGY | Facility: MEDICAL CENTER | Age: 48
End: 2023-08-07
Attending: ORTHOPAEDIC SURGERY
Payer: COMMERCIAL

## 2023-08-07 ENCOUNTER — HOSPITAL ENCOUNTER (OUTPATIENT)
Facility: MEDICAL CENTER | Age: 48
End: 2023-08-07
Attending: ORTHOPAEDIC SURGERY | Admitting: ORTHOPAEDIC SURGERY
Payer: COMMERCIAL

## 2023-08-07 VITALS
DIASTOLIC BLOOD PRESSURE: 85 MMHG | BODY MASS INDEX: 21.51 KG/M2 | HEIGHT: 71 IN | RESPIRATION RATE: 16 BRPM | WEIGHT: 153.66 LBS | SYSTOLIC BLOOD PRESSURE: 134 MMHG | TEMPERATURE: 96.8 F | OXYGEN SATURATION: 97 % | HEART RATE: 77 BPM

## 2023-08-07 LAB — HCG UR QL: NEGATIVE

## 2023-08-07 PROCEDURE — 160036 HCHG PACU - EA ADDL 30 MINS PHASE I: Performed by: ORTHOPAEDIC SURGERY

## 2023-08-07 PROCEDURE — 88342 IMHCHEM/IMCYTCHM 1ST ANTB: CPT

## 2023-08-07 PROCEDURE — 160046 HCHG PACU - 1ST 60 MINS PHASE II: Performed by: ORTHOPAEDIC SURGERY

## 2023-08-07 PROCEDURE — 160039 HCHG SURGERY MINUTES - EA ADDL 1 MIN LEVEL 3: Performed by: ORTHOPAEDIC SURGERY

## 2023-08-07 PROCEDURE — 700101 HCHG RX REV CODE 250: Performed by: ORTHOPAEDIC SURGERY

## 2023-08-07 PROCEDURE — 700111 HCHG RX REV CODE 636 W/ 250 OVERRIDE (IP): Mod: JZ

## 2023-08-07 PROCEDURE — 13160 SEC CLSR SURG WND/DEHSN XTN: CPT | Mod: LT | Performed by: ORTHOPAEDIC SURGERY

## 2023-08-07 PROCEDURE — 160035 HCHG PACU - 1ST 60 MINS PHASE I: Performed by: ORTHOPAEDIC SURGERY

## 2023-08-07 PROCEDURE — 81025 URINE PREGNANCY TEST: CPT

## 2023-08-07 PROCEDURE — 700111 HCHG RX REV CODE 636 W/ 250 OVERRIDE (IP): Mod: JZ | Performed by: ANESTHESIOLOGY

## 2023-08-07 PROCEDURE — 700101 HCHG RX REV CODE 250: Performed by: ANESTHESIOLOGY

## 2023-08-07 PROCEDURE — 20240 BONE BIOPSY OPEN SUPERFICIAL: CPT | Mod: 80ROC,59 | Performed by: STUDENT IN AN ORGANIZED HEALTH CARE EDUCATION/TRAINING PROGRAM

## 2023-08-07 PROCEDURE — 87077 CULTURE AEROBIC IDENTIFY: CPT

## 2023-08-07 PROCEDURE — 20240 BONE BIOPSY OPEN SUPERFICIAL: CPT | Mod: 59 | Performed by: ORTHOPAEDIC SURGERY

## 2023-08-07 PROCEDURE — 160025 RECOVERY II MINUTES (STATS): Performed by: ORTHOPAEDIC SURGERY

## 2023-08-07 PROCEDURE — 28003 TREATMENT OF FOOT INFECTION: CPT | Mod: LT | Performed by: ORTHOPAEDIC SURGERY

## 2023-08-07 PROCEDURE — 13160 SEC CLSR SURG WND/DEHSN XTN: CPT | Mod: 80ROC,LT | Performed by: STUDENT IN AN ORGANIZED HEALTH CARE EDUCATION/TRAINING PROGRAM

## 2023-08-07 PROCEDURE — 87015 SPECIMEN INFECT AGNT CONCNTJ: CPT

## 2023-08-07 PROCEDURE — 20680 REMOVAL OF IMPLANT DEEP: CPT | Mod: 80ROC,LT | Performed by: STUDENT IN AN ORGANIZED HEALTH CARE EDUCATION/TRAINING PROGRAM

## 2023-08-07 PROCEDURE — 700111 HCHG RX REV CODE 636 W/ 250 OVERRIDE (IP): Mod: JZ | Performed by: ORTHOPAEDIC SURGERY

## 2023-08-07 PROCEDURE — 700102 HCHG RX REV CODE 250 W/ 637 OVERRIDE(OP): Performed by: ANESTHESIOLOGY

## 2023-08-07 PROCEDURE — 87116 MYCOBACTERIA CULTURE: CPT

## 2023-08-07 PROCEDURE — 87147 CULTURE TYPE IMMUNOLOGIC: CPT

## 2023-08-07 PROCEDURE — 87075 CULTR BACTERIA EXCEPT BLOOD: CPT

## 2023-08-07 PROCEDURE — 87206 SMEAR FLUORESCENT/ACID STAI: CPT

## 2023-08-07 PROCEDURE — 160002 HCHG RECOVERY MINUTES (STAT): Performed by: ORTHOPAEDIC SURGERY

## 2023-08-07 PROCEDURE — 88311 DECALCIFY TISSUE: CPT

## 2023-08-07 PROCEDURE — 160048 HCHG OR STATISTICAL LEVEL 1-5: Performed by: ORTHOPAEDIC SURGERY

## 2023-08-07 PROCEDURE — 87102 FUNGUS ISOLATION CULTURE: CPT

## 2023-08-07 PROCEDURE — A9270 NON-COVERED ITEM OR SERVICE: HCPCS | Performed by: ANESTHESIOLOGY

## 2023-08-07 PROCEDURE — 160009 HCHG ANES TIME/MIN: Performed by: ORTHOPAEDIC SURGERY

## 2023-08-07 PROCEDURE — 700105 HCHG RX REV CODE 258: Mod: JZ | Performed by: ORTHOPAEDIC SURGERY

## 2023-08-07 PROCEDURE — 87070 CULTURE OTHR SPECIMN AEROBIC: CPT

## 2023-08-07 PROCEDURE — 20680 REMOVAL OF IMPLANT DEEP: CPT | Mod: LT | Performed by: ORTHOPAEDIC SURGERY

## 2023-08-07 PROCEDURE — 87205 SMEAR GRAM STAIN: CPT | Mod: 91

## 2023-08-07 PROCEDURE — 88341 IMHCHEM/IMCYTCHM EA ADD ANTB: CPT | Mod: 91

## 2023-08-07 PROCEDURE — 28003 TREATMENT OF FOOT INFECTION: CPT | Mod: 80ROC,LT | Performed by: STUDENT IN AN ORGANIZED HEALTH CARE EDUCATION/TRAINING PROGRAM

## 2023-08-07 PROCEDURE — 160028 HCHG SURGERY MINUTES - 1ST 30 MINS LEVEL 3: Performed by: ORTHOPAEDIC SURGERY

## 2023-08-07 PROCEDURE — 88304 TISSUE EXAM BY PATHOLOGIST: CPT

## 2023-08-07 PROCEDURE — 87186 SC STD MICRODIL/AGAR DIL: CPT

## 2023-08-07 RX ORDER — BUPIVACAINE HYDROCHLORIDE 5 MG/ML
INJECTION, SOLUTION EPIDURAL; INTRACAUDAL
Status: DISCONTINUED | OUTPATIENT
Start: 2023-08-07 | End: 2023-08-07 | Stop reason: HOSPADM

## 2023-08-07 RX ORDER — LIDOCAINE HYDROCHLORIDE 10 MG/ML
INJECTION, SOLUTION INFILTRATION; PERINEURAL
Status: DISCONTINUED | OUTPATIENT
Start: 2023-08-07 | End: 2023-08-07 | Stop reason: HOSPADM

## 2023-08-07 RX ORDER — MIDAZOLAM HYDROCHLORIDE 1 MG/ML
INJECTION INTRAMUSCULAR; INTRAVENOUS PRN
Status: DISCONTINUED | OUTPATIENT
Start: 2023-08-07 | End: 2023-08-07 | Stop reason: SURG

## 2023-08-07 RX ORDER — HYDROMORPHONE HYDROCHLORIDE 1 MG/ML
0.5 INJECTION, SOLUTION INTRAMUSCULAR; INTRAVENOUS; SUBCUTANEOUS
Status: DISCONTINUED | OUTPATIENT
Start: 2023-08-07 | End: 2023-08-07 | Stop reason: HOSPADM

## 2023-08-07 RX ORDER — HALOPERIDOL 5 MG/ML
1 INJECTION INTRAMUSCULAR
Status: DISCONTINUED | OUTPATIENT
Start: 2023-08-07 | End: 2023-08-07 | Stop reason: HOSPADM

## 2023-08-07 RX ORDER — ACETAMINOPHEN 500 MG
1000 TABLET ORAL ONCE
Status: COMPLETED | OUTPATIENT
Start: 2023-08-07 | End: 2023-08-07

## 2023-08-07 RX ORDER — SODIUM CHLORIDE, SODIUM LACTATE, POTASSIUM CHLORIDE, CALCIUM CHLORIDE 600; 310; 30; 20 MG/100ML; MG/100ML; MG/100ML; MG/100ML
INJECTION, SOLUTION INTRAVENOUS CONTINUOUS
Status: ACTIVE | OUTPATIENT
Start: 2023-08-07 | End: 2023-08-07

## 2023-08-07 RX ORDER — SODIUM CHLORIDE, SODIUM LACTATE, POTASSIUM CHLORIDE, CALCIUM CHLORIDE 600; 310; 30; 20 MG/100ML; MG/100ML; MG/100ML; MG/100ML
INJECTION, SOLUTION INTRAVENOUS CONTINUOUS
Status: DISCONTINUED | OUTPATIENT
Start: 2023-08-07 | End: 2023-08-07 | Stop reason: HOSPADM

## 2023-08-07 RX ORDER — VANCOMYCIN HYDROCHLORIDE 1 G/20ML
INJECTION, POWDER, LYOPHILIZED, FOR SOLUTION INTRAVENOUS
Status: COMPLETED | OUTPATIENT
Start: 2023-08-07 | End: 2023-08-07

## 2023-08-07 RX ORDER — OXYCODONE HCL 5 MG/5 ML
5 SOLUTION, ORAL ORAL
Status: COMPLETED | OUTPATIENT
Start: 2023-08-07 | End: 2023-08-07

## 2023-08-07 RX ORDER — CEFAZOLIN SODIUM 1 G/3ML
2 INJECTION, POWDER, FOR SOLUTION INTRAMUSCULAR; INTRAVENOUS ONCE
Status: COMPLETED | OUTPATIENT
Start: 2023-08-07 | End: 2023-08-07

## 2023-08-07 RX ORDER — OXYCODONE HCL 5 MG/5 ML
10 SOLUTION, ORAL ORAL
Status: COMPLETED | OUTPATIENT
Start: 2023-08-07 | End: 2023-08-07

## 2023-08-07 RX ORDER — HYDROMORPHONE HYDROCHLORIDE 1 MG/ML
0.2 INJECTION, SOLUTION INTRAMUSCULAR; INTRAVENOUS; SUBCUTANEOUS
Status: DISCONTINUED | OUTPATIENT
Start: 2023-08-07 | End: 2023-08-07 | Stop reason: HOSPADM

## 2023-08-07 RX ORDER — LIDOCAINE HYDROCHLORIDE 20 MG/ML
INJECTION, SOLUTION EPIDURAL; INFILTRATION; INTRACAUDAL; PERINEURAL PRN
Status: DISCONTINUED | OUTPATIENT
Start: 2023-08-07 | End: 2023-08-07 | Stop reason: SURG

## 2023-08-07 RX ORDER — DEXAMETHASONE SODIUM PHOSPHATE 4 MG/ML
INJECTION, SOLUTION INTRA-ARTICULAR; INTRALESIONAL; INTRAMUSCULAR; INTRAVENOUS; SOFT TISSUE PRN
Status: DISCONTINUED | OUTPATIENT
Start: 2023-08-07 | End: 2023-08-07 | Stop reason: SURG

## 2023-08-07 RX ORDER — MEPERIDINE HYDROCHLORIDE 25 MG/ML
12.5 INJECTION INTRAMUSCULAR; INTRAVENOUS; SUBCUTANEOUS
Status: DISCONTINUED | OUTPATIENT
Start: 2023-08-07 | End: 2023-08-07 | Stop reason: HOSPADM

## 2023-08-07 RX ORDER — HYDROMORPHONE HYDROCHLORIDE 1 MG/ML
0.4 INJECTION, SOLUTION INTRAMUSCULAR; INTRAVENOUS; SUBCUTANEOUS
Status: DISCONTINUED | OUTPATIENT
Start: 2023-08-07 | End: 2023-08-07 | Stop reason: HOSPADM

## 2023-08-07 RX ORDER — HYDRALAZINE HYDROCHLORIDE 20 MG/ML
5 INJECTION INTRAMUSCULAR; INTRAVENOUS
Status: DISCONTINUED | OUTPATIENT
Start: 2023-08-07 | End: 2023-08-07 | Stop reason: HOSPADM

## 2023-08-07 RX ORDER — DIPHENHYDRAMINE HYDROCHLORIDE 50 MG/ML
12.5 INJECTION INTRAMUSCULAR; INTRAVENOUS
Status: DISCONTINUED | OUTPATIENT
Start: 2023-08-07 | End: 2023-08-07 | Stop reason: HOSPADM

## 2023-08-07 RX ORDER — CELECOXIB 200 MG/1
200 CAPSULE ORAL ONCE
Status: COMPLETED | OUTPATIENT
Start: 2023-08-07 | End: 2023-08-07

## 2023-08-07 RX ORDER — ONDANSETRON 2 MG/ML
4 INJECTION INTRAMUSCULAR; INTRAVENOUS
Status: DISCONTINUED | OUTPATIENT
Start: 2023-08-07 | End: 2023-08-07 | Stop reason: HOSPADM

## 2023-08-07 RX ORDER — ONDANSETRON 2 MG/ML
INJECTION INTRAMUSCULAR; INTRAVENOUS PRN
Status: DISCONTINUED | OUTPATIENT
Start: 2023-08-07 | End: 2023-08-07 | Stop reason: SURG

## 2023-08-07 RX ADMIN — DEXAMETHASONE SODIUM PHOSPHATE 8 MG: 4 INJECTION INTRA-ARTICULAR; INTRALESIONAL; INTRAMUSCULAR; INTRAVENOUS; SOFT TISSUE at 18:22

## 2023-08-07 RX ADMIN — FENTANYL CITRATE 50 MCG: 50 INJECTION, SOLUTION INTRAMUSCULAR; INTRAVENOUS at 19:33

## 2023-08-07 RX ADMIN — PROPOFOL 200 MG: 10 INJECTION, EMULSION INTRAVENOUS at 18:20

## 2023-08-07 RX ADMIN — MIDAZOLAM 2 MG: 1 INJECTION, SOLUTION INTRAMUSCULAR; INTRAVENOUS at 18:16

## 2023-08-07 RX ADMIN — ACETAMINOPHEN 1000 MG: 500 TABLET, FILM COATED ORAL at 16:42

## 2023-08-07 RX ADMIN — CEFAZOLIN 2 G: 1 INJECTION, POWDER, FOR SOLUTION INTRAMUSCULAR; INTRAVENOUS at 18:16

## 2023-08-07 RX ADMIN — SODIUM CHLORIDE, POTASSIUM CHLORIDE, SODIUM LACTATE AND CALCIUM CHLORIDE: 600; 310; 30; 20 INJECTION, SOLUTION INTRAVENOUS at 16:48

## 2023-08-07 RX ADMIN — ONDANSETRON 4 MG: 2 INJECTION INTRAMUSCULAR; INTRAVENOUS at 19:07

## 2023-08-07 RX ADMIN — CELECOXIB 200 MG: 200 CAPSULE ORAL at 16:42

## 2023-08-07 RX ADMIN — FENTANYL CITRATE 100 MCG: 50 INJECTION, SOLUTION INTRAMUSCULAR; INTRAVENOUS at 18:27

## 2023-08-07 RX ADMIN — FENTANYL CITRATE 100 MCG: 50 INJECTION, SOLUTION INTRAMUSCULAR; INTRAVENOUS at 18:20

## 2023-08-07 RX ADMIN — FENTANYL CITRATE 50 MCG: 50 INJECTION, SOLUTION INTRAMUSCULAR; INTRAVENOUS at 19:55

## 2023-08-07 RX ADMIN — LIDOCAINE HYDROCHLORIDE 80 MG: 20 INJECTION, SOLUTION EPIDURAL; INFILTRATION; INTRACAUDAL at 18:20

## 2023-08-07 RX ADMIN — OXYCODONE HYDROCHLORIDE 10 MG: 5 SOLUTION ORAL at 19:50

## 2023-08-07 ASSESSMENT — PAIN DESCRIPTION - PAIN TYPE
TYPE: SURGICAL PAIN

## 2023-08-07 NOTE — LETTER
July 14, 2023    Patient Name: Isabell Lopez  Surgeon Name: Roby Espinosa M.D.  Surgery Facility: [facility] ([address] [facility])  Surgery Date: 8/7/2023    The time of your surgery is not final and may change up to and until the day of your surgery. You will be contacted 24-48 hours prior to your surgery date with your check-in and surgery time.    If you will not be at one of the below numbers please call the surgery scheduler at [phone]  Preferred Phone: [phone]    BEFORE YOUR SURGERY   Pre Registration and/or Lab Work must be done within and no earlier than 28 days prior to your surgery date. Please call [facility] at [phone] for an appointment as soon as possible.    The [facility] offers a class for patients undergoing a total hip/knee replacement surgery scheduled at our outpatient surgery center. Information about what to expect for preparation, the day of surgery and recovery will be given. We highly recommend bringing your support for surgery with you to the class, as well as any questions you may have. If you are interested in attending the class, please call [phone] for scheduling.     Pre op Appointment:  Instructions: Bring a list of all medications you are taking including the dosing and frequency.    DAY OF YOUR SURGERY  Nothing to eat or drink after midnight     Refrain from smoking any substance after midnight prior to surgery. Smoking may interfere with the anesthetic and frequently produces nausea during the recovery period.    Continue taking all lifesaving medications. Including the morning of your surgery with small sip of water.    Please do NOT take on the day of surgery:  Diuretics: examples- furosemide (Lasix), spironolactone, hydrochlorothiazide  ACE-inhibitors: examples- lisinopril, ramipril, enalapril  “ARBs”: examples- losartan, Olmesartan, valsartan    Please arrive at the hospital/surgery center at  the check-in time provided.     An adult will need to bring you and take you home after your surgery.     AFTER YOUR SURGERY  Post op Appointment:   Date: 08/22/23   Time: 09:15AM   With: Roby Espinosa MD   Location: [address] [place] [address]    TIME OFF WORK  FMLA or Disability forms can be faxed directly to: [phone] or you may drop them off at [address] [place] [address]. Our office charges a $35.00 fee per form. Forms will be completed within 10 business days of drop off and payment received. For the status of your forms you may contact our disability office directly [phone].    MEDICATION INSTRUCTIONS **Please read section completely**    The following medications should be stopped a minimum of 10 days prior to surgery:  All over the counter, Supplements & Herbal medications    Anorectics: Phentermine (Adipex-P, Lomaira and Suprenza), Phentermine-topiramate (Qsymia), Bupropion-naltrexone (Contrave)    Opiod Partial Agonists/Opioid Antagonists: Buprenorphine (Subocone, Belbuca, Butrans, Probuphine Implant, Sublocade), Naltrexone (ReVia, Vivitrol), Naloxone    Amphetamines: Dextroamphetamine/Amphetamine (Adderall, Mydayis), Methylphenidate Hydrochloride (Concerta, Metadate, Methylin, Ritalin)    The following medications should be stopped 5 days prior to surgery:  Blood Thinners: Any Aspirin, Aspirin products, anti-inflammatories such as ibuprofen and any blood thinners such as Coumadin and Plavix. Please consult your prescribing physician if you are on life saving blood thinners, in regards to when to stop medications prior to surgery.     The following medications should be stopped a minimum of 3 days prior to surgery:  PDE-5 inhibitors: Sildenafil (Viagra), Tadalafil (Cialis), Vardenafil (Levitra), Avanafil (Stendra)    MAO Inhibitors: Rasagiline (Azilect), Selegiline (Eldepryl, Emsam, Selapar), Isocarboxazid (Marplan), Phenelzine (Nardil)         COVID and INFLUENZA NOTICE  TO PATIENTS    Currently, the [facility] does not routinely test patients for COVID-19 or Influenza prior to their elective surgery.  However, if patients develop the following symptoms prior to their surgery date, they should voluntarily test for COVID-19 and Influenza and notify the surgical office of their condition and results.  The symptoms warranting testing would be any two of the following:    Fever (Temp above 100.4 F)  Chills  Cough  Shortness of breath or difficulty breathing  Fatigue  Myalgias (muscle or body aches)  Headache  Sore Throat  Congestion or Runny Nose  Nausea or vomiting  Diarrhea  New loss of taste or smell    Having these symptoms prior to surgery can significantly increase your risk of morbidity and mortality under anesthesia, which may compromise your health and require a transfer to a hospital for a higher level of care.  Therefore, it is advisable to notify the surgical team of any illness in order to get information for the appropriate time to delay the surgery to minimize these preventable risks.    Your health and safety are our number one priority at the [facility], and we are thankful that you entrust us with your care.  Please help us ensure the best possible surgical and anesthetic outcome by sharing appropriate health information with our perioperative team and staff.  We look forward to taking great care of you!    Thank you for your time and consideration on this matter.    Joe Vallejo MD  Medical Director  Anesthesiologist  [facility]

## 2023-08-08 LAB
FUNGUS SPEC FUNGUS STN: NORMAL
GRAM STN SPEC: ABNORMAL
PATHOLOGY CONSULT NOTE: NORMAL
RHODAMINE-AURAMINE STN SPEC: NORMAL
SIGNIFICANT IND 70042: ABNORMAL
SIGNIFICANT IND 70042: NORMAL
SIGNIFICANT IND 70042: NORMAL
SITE SITE: ABNORMAL
SITE SITE: NORMAL
SITE SITE: NORMAL
SOURCE SOURCE: ABNORMAL
SOURCE SOURCE: NORMAL
SOURCE SOURCE: NORMAL

## 2023-08-08 ASSESSMENT — PAIN SCALES - GENERAL: PAIN_LEVEL: 5

## 2023-08-08 NOTE — ANESTHESIA TIME REPORT
Anesthesia Start and Stop Event Times     Date Time Event    8/7/2023 1735 Ready for Procedure     1816 Anesthesia Start     1919 Anesthesia Stop        Responsible Staff  08/07/23    Name Role Begin End    Nghia Bejarano M.D. Anesth 1816 1919        Overtime Reason:  no overtime (within assigned shift)    Comments:

## 2023-08-08 NOTE — ANESTHESIA POSTPROCEDURE EVALUATION
Patient: Isabell Lopez    Procedure Summary     Date: 08/07/23 Room / Location: [place] [address] / SURGERY [facility]    Anesthesia Start: 1816 Anesthesia Stop: 1919    Procedures:       FOOT IRRIGATION AND DEBRIDEMENT (Left: Leg Lower)      REMOVAL, HARDWARE (Left: Leg Lower) Diagnosis:       Pain of left foot      (Pain of left foot [M79.672])    Surgeons: Roby Espinosa M.D. Responsible Provider: Nghia Bejarano M.D.    Anesthesia Type: general ASA Status: 2          Final Anesthesia Type: general  Last vitals  BP   Blood Pressure: 134/85    Temp   36 °C (96.8 °F)    Pulse   77   Resp   16    SpO2   97 %      Anesthesia Post Evaluation    Patient location during evaluation: PACU  Patient participation: complete - patient participated  Level of consciousness: awake and alert  Pain score: 5    Airway patency: patent  Anesthetic complications: no  Cardiovascular status: hemodynamically stable  Respiratory status: acceptable  Hydration status: euvolemic    PONV: none          No notable events documented.     Nurse Pain Score: 5 (NPRS)

## 2023-08-08 NOTE — ANESTHESIA PROCEDURE NOTES
Airway    Date/Time: 8/7/2023 6:20 PM    Performed by: Nghia Bejarano M.D.  Authorized by: Nghia Bejarano M.D.    Location:  OR  Urgency:  Elective  Indications for Airway Management:  Anesthesia      Spontaneous Ventilation: absent    Sedation Level:  Deep  Preoxygenated: Yes    Final Airway Type:  Supraglottic airway  Final Supraglottic Airway:  Standard LMA    SGA Size:  4  Number of Attempts at Approach:  1   Atraumatic insertion, good seal

## 2023-08-08 NOTE — ANESTHESIA PREPROCEDURE EVALUATION
Case: [id number] Date/Time: 23 2360    Procedures:       LEFT FOOT REMOVAL OF HARDWARE, FOOT IRRIGATION AND DEBRIDEMENT, REPAIRS AS INDICATED (Left: Leg Lower)      REMOVAL, HARDWARE (Left: Leg Lower)    Anesthesia type: General    Diagnosis: Pain of left foot [M79.672]    Pre-op diagnosis: Pain of left foot [M79.672]    Location: [place] [address] / SURGERY [facility]    Surgeons: Roby Espinosa M.D.        Denies: MI/CHF/smoking/CVA/DM/CKD      Relevant Problems   OB   (positive) S/P  section      Other   (positive) Rheumatoid arthritis of hand (HCC)   (positive) Seropositive erosive rheumatoid arthritis (positive RF and ACPA)       Physical Exam    Airway   Mallampati: II  TM distance: >3 FB  Neck ROM: full       Cardiovascular - normal exam  Rhythm: regular  Rate: normal  (-) murmur     Dental - normal exam           Pulmonary - normal exam  Breath sounds clear to auscultation     Abdominal    Neurological - normal exam                 Anesthesia Plan    ASA 2       Plan - general       Airway plan will be LMA          Induction: intravenous    Postoperative Plan: Postoperative administration of opioids is intended.    Pertinent diagnostic labs and testing reviewed    Informed Consent:    Anesthetic plan and risks discussed with patient.    Use of blood products discussed with: patient whom consented to blood products.

## 2023-08-08 NOTE — OP REPORT
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     COMPLICATIONS:  None.    FINDINGS:  See preoperative diagnosis    Specimens  Left foot soft tissue culture  Left first metatarsal bone biopsy for culture and pathology     POSTOPERATIVE PLAN:  Weightbearing as tolerated in postop shoe  Follow-up in 2 weeks     INDICATIONS:  The patient is a pleasant 48 y.o. female who has had   problems with the left foot.  Options were discussed   including operative and nonoperative.  The patients elected to undergo operative   intervention.  The above procedure was discussed.  All questions were   answered.  Risks of surgery explained, which included but not limited to   explained wound problems, infection, nerve injury, vascular injury, need for   further surgery.  The patient understands that they could have persistent risk of    pain and need for further surgery.  The patients understands and accepts these risks   and agreed to proceed.  Site was marked by myself prior to receiving   psychotropic medicines.     PROCEDURE IN DETAIL:  The patient was brought to the operating room and    underwent general endotracheal anesthetic without complications.  Left lower   extremity was prepped and draped in standard fashion in supine position with   all appropriate padding.  Positive site verification confirmed the appropriate   extremity as well as above procedure and confirmation that the patient received   preoperative antibiotics.  The leg was elevated and tourniquet was inflated to 250  mmHg.    Dorsal incision was made following previous incision.  The area of wound was approximately 1 cm squares of the dehiscence and this was ellipsed out.  Subjective down to the plate where the sinus track followed to the level of the plate.  Sharp excisional debridement was performed with a 15 blade down to the level of bone removing any tissue that appeared to be nonviable.  In addition she had a lot of hypertrophic scar tissue and this was also debrided out.  Is also some redundant skin around her incision so approximately 5 mm was removed on each side of the incision to take out the redundancy.    Using the appropriate screwdriver all screws were removed from the plate and the plate was removed.  All screws were intact.    Further sharp excisional debridement was brought down to the level of the bone using combination of curette and rongeur.  Soft tissue was sent for culture.  It was noted that the IP joint had a significant amount of inflammatory tissue and had a significant amount of destruction of the distal aspect of the proximal phalanx.  Everything was debrided down to good quality tissue surrounding the periphery including against bone.  A thorough irrigation was performed and all tissue appeared to be viable.    Using deep and superficial sutures a complex closure was performed on the surgical wound dehiscence.  I was able to get a nice tension-free closure and have the wound completely closed.    Sterile dressings were applied.  Tourniquet was released.  Patient was transferred to the recovery room in good condition.    Utilization of Dr. Chavez was necessary for patient positioning needing holding retracting wound closure and dressing placement.  The assistant was present throughout the entire procedure. 08/07/23

## 2023-08-08 NOTE — DISCHARGE INSTRUCTIONS
Weight Bearing As Tolerated  Postop shoe    If any questions arise, call your provider.  If your provider is not available, please feel free to call the Surgical Center at [phone].    MEDICATIONS: Resume taking daily medication.  Take prescribed pain medication with food.  If no medication is prescribed, you may take non-aspirin pain medication if needed.  PAIN MEDICATION CAN BE VERY CONSTIPATING.  Take a stool softener or laxative such as senokot, pericolace, or milk of magnesia if needed.    Last pain medication given 1000mg of Tylenol 4:42pm, 200 mg of Celebrex 4:42pm, and 10 mg Roxicodone 7:50pm.    What to Expect Post Anesthesia    Rest and take it easy for the first 24 hours.  A responsible adult is recommended to remain with you during that time.  It is normal to feel sleepy.  We encourage you to not do anything that requires balance, judgment or coordination.    FOR 24 HOURS DO NOT:  Drive, operate machinery or run household appliances.  Drink beer or alcoholic beverages.  Make important decisions or sign legal documents.    To avoid nausea, slowly advance diet as tolerated, avoiding spicy or greasy foods for the first day.  Add more substantial food to your diet according to your provider's instructions.  INCREASE FLUIDS AND FIBER TO AVOID CONSTIPATION.    MILD FLU-LIKE SYMPTOMS ARE NORMAL.  YOU MAY EXPERIENCE GENERALIZED MUSCLE ACHES, THROAT IRRITATION, HEADACHE AND/OR SOME NAUSEA.    Diet    Resume your normal diet as tolerated.  A diet low in cholesterol, fat, and sodium is recommended for good health.

## 2023-08-08 NOTE — OR NURSING
2026 - Pt's vital signs are stable, pt is alert and pain is at a tolerable level at this time. Dressing is clean, dry and intact - ace wrap visible. Pt has no complaints of nausea or vomiting.    2045- Pts foot started bleeding, dressing reinforced.     2100 - Discharge instructions and follow up appointments were discussed with pt and . Pt's IV was discontinued and pt was given all belongings. Pt had no other questions. Pt pushed out via wheelchair.

## 2023-08-08 NOTE — OR NURSING
1918 Pt arrived from OR in a gurney. ID band verified. Report received from anesthesia and OR nurse. No belongings with pt. Pt is on telemonitor.    1935 Attempted to call spouse. No answer.     2020 Report given to phase 2 RN.

## 2023-08-08 NOTE — INTERVAL H&P NOTE
H&P updated. The patient was examined and no change in history or physical exam.  Cardiovascular and pulm exam unremarkable.

## 2023-08-10 LAB
BACTERIA TISS AEROBE CULT: ABNORMAL
BACTERIA TISS AEROBE CULT: ABNORMAL
GRAM STN SPEC: ABNORMAL
SIGNIFICANT IND 70042: ABNORMAL
SITE SITE: ABNORMAL
SOURCE SOURCE: ABNORMAL

## 2023-08-11 LAB
BACTERIA SPEC ANAEROBE CULT: NORMAL
SIGNIFICANT IND 70042: NORMAL
SITE SITE: NORMAL
SOURCE SOURCE: NORMAL

## 2023-08-17 ENCOUNTER — HOSPITAL ENCOUNTER (OUTPATIENT)
Dept: LAB | Facility: MEDICAL CENTER | Age: 48
End: 2023-08-17
Attending: NURSE PRACTITIONER
Payer: COMMERCIAL

## 2023-08-17 ENCOUNTER — OFFICE VISIT (OUTPATIENT)
Dept: INFECTIOUS DISEASES | Facility: MEDICAL CENTER | Age: 48
End: 2023-08-17
Attending: NURSE PRACTITIONER
Payer: COMMERCIAL

## 2023-08-17 VITALS
RESPIRATION RATE: 16 BRPM | DIASTOLIC BLOOD PRESSURE: 80 MMHG | HEART RATE: 95 BPM | OXYGEN SATURATION: 96 % | WEIGHT: 147.93 LBS | TEMPERATURE: 97.6 F | BODY MASS INDEX: 21.18 KG/M2 | SYSTOLIC BLOOD PRESSURE: 108 MMHG | HEIGHT: 70 IN

## 2023-08-17 DIAGNOSIS — M86.272 SUBACUTE OSTEOMYELITIS OF LEFT FOOT (HCC): ICD-10-CM

## 2023-08-17 DIAGNOSIS — A49.01 MSSA (METHICILLIN SUSCEPTIBLE STAPHYLOCOCCUS AUREUS) INFECTION: ICD-10-CM

## 2023-08-17 LAB
ALBUMIN SERPL BCP-MCNC: 4.5 G/DL (ref 3.2–4.9)
ALBUMIN/GLOB SERPL: 1.4 G/DL
ALP SERPL-CCNC: 68 U/L (ref 30–99)
ALT SERPL-CCNC: 14 U/L (ref 2–50)
ANION GAP SERPL CALC-SCNC: 9 MMOL/L (ref 7–16)
AST SERPL-CCNC: 18 U/L (ref 12–45)
BASOPHILS # BLD AUTO: 1.4 % (ref 0–1.8)
BASOPHILS # BLD: 0.11 K/UL (ref 0–0.12)
BILIRUB SERPL-MCNC: 0.6 MG/DL (ref 0.1–1.5)
BUN SERPL-MCNC: 14 MG/DL (ref 8–22)
CALCIUM ALBUM COR SERPL-MCNC: 9.3 MG/DL (ref 8.5–10.5)
CALCIUM SERPL-MCNC: 9.7 MG/DL (ref 8.5–10.5)
CHLORIDE SERPL-SCNC: 102 MMOL/L (ref 96–112)
CO2 SERPL-SCNC: 25 MMOL/L (ref 20–33)
CREAT SERPL-MCNC: 0.74 MG/DL (ref 0.5–1.4)
EOSINOPHIL # BLD AUTO: 0.78 K/UL (ref 0–0.51)
EOSINOPHIL NFR BLD: 10.1 % (ref 0–6.9)
ERYTHROCYTE [DISTWIDTH] IN BLOOD BY AUTOMATED COUNT: 45.2 FL (ref 35.9–50)
GFR SERPLBLD CREATININE-BSD FMLA CKD-EPI: 101 ML/MIN/1.73 M 2
GLOBULIN SER CALC-MCNC: 3.2 G/DL (ref 1.9–3.5)
GLUCOSE SERPL-MCNC: 134 MG/DL (ref 65–99)
HCT VFR BLD AUTO: 46.9 % (ref 37–47)
HGB BLD-MCNC: 14.7 G/DL (ref 12–16)
IMM GRANULOCYTES # BLD AUTO: 0.02 K/UL (ref 0–0.11)
IMM GRANULOCYTES NFR BLD AUTO: 0.3 % (ref 0–0.9)
LYMPHOCYTES # BLD AUTO: 2.09 K/UL (ref 1–4.8)
LYMPHOCYTES NFR BLD: 27.1 % (ref 22–41)
MCH RBC QN AUTO: 26.4 PG (ref 27–33)
MCHC RBC AUTO-ENTMCNC: 31.3 G/DL (ref 32.2–35.5)
MCV RBC AUTO: 84.2 FL (ref 81.4–97.8)
MONOCYTES # BLD AUTO: 0.51 K/UL (ref 0–0.85)
MONOCYTES NFR BLD AUTO: 6.6 % (ref 0–13.4)
NEUTROPHILS # BLD AUTO: 4.2 K/UL (ref 1.82–7.42)
NEUTROPHILS NFR BLD: 54.5 % (ref 44–72)
NRBC # BLD AUTO: 0 K/UL
NRBC BLD-RTO: 0 /100 WBC (ref 0–0.2)
PLATELET # BLD AUTO: 419 K/UL (ref 164–446)
PMV BLD AUTO: 10.6 FL (ref 9–12.9)
POTASSIUM SERPL-SCNC: 4.7 MMOL/L (ref 3.6–5.5)
PROT SERPL-MCNC: 7.7 G/DL (ref 6–8.2)
RBC # BLD AUTO: 5.57 M/UL (ref 4.2–5.4)
SODIUM SERPL-SCNC: 136 MMOL/L (ref 135–145)
WBC # BLD AUTO: 7.7 K/UL (ref 4.8–10.8)

## 2023-08-17 PROCEDURE — 36415 COLL VENOUS BLD VENIPUNCTURE: CPT

## 2023-08-17 PROCEDURE — 99211 OFF/OP EST MAY X REQ PHY/QHP: CPT | Performed by: NURSE PRACTITIONER

## 2023-08-17 PROCEDURE — 3074F SYST BP LT 130 MM HG: CPT | Performed by: NURSE PRACTITIONER

## 2023-08-17 PROCEDURE — 99213 OFFICE O/P EST LOW 20 MIN: CPT | Performed by: NURSE PRACTITIONER

## 2023-08-17 PROCEDURE — 3079F DIAST BP 80-89 MM HG: CPT | Performed by: NURSE PRACTITIONER

## 2023-08-17 PROCEDURE — 85025 COMPLETE CBC W/AUTO DIFF WBC: CPT

## 2023-08-17 PROCEDURE — 80053 COMPREHEN METABOLIC PANEL: CPT

## 2023-08-17 RX ORDER — DOXYCYCLINE HYCLATE 100 MG
100 TABLET ORAL 2 TIMES DAILY
Qty: 60 TABLET | Refills: 0 | Status: SHIPPED | OUTPATIENT
Start: 2023-08-17 | End: 2024-01-10

## 2023-08-17 ASSESSMENT — ENCOUNTER SYMPTOMS
FEVER: 0
NAUSEA: 0
WHEEZING: 0
WEIGHT LOSS: 0
DIZZINESS: 0
BLURRED VISION: 0
NERVOUS/ANXIOUS: 0
DOUBLE VISION: 0
PALPITATIONS: 0
DEPRESSION: 0
VOMITING: 0
DIARRHEA: 0
MYALGIAS: 0
FOCAL WEAKNESS: 0
SPUTUM PRODUCTION: 0
SHORTNESS OF BREATH: 0
ABDOMINAL PAIN: 0
HEADACHES: 0
BRUISES/BLEEDS EASILY: 0
CHILLS: 0
COUGH: 0
CONSTIPATION: 0

## 2023-08-17 ASSESSMENT — PATIENT HEALTH QUESTIONNAIRE - PHQ9: CLINICAL INTERPRETATION OF PHQ2 SCORE: 0

## 2023-08-17 NOTE — PROGRESS NOTES
INFECTIOUS  DISEASE  OUTPATIENT CLINIC  NOTE   Subjective   Primary care provider: Jenniffer Bermudez M.D..     Reason for Follow Up:   Follow-up for   1. MSSA (methicillin susceptible Staphylococcus aureus) infection  doxycycline (VIBRAMYCIN) 100 MG Tab    CBC WITH DIFFERENTIAL    Comp Metabolic Panel      2. Subacute osteomyelitis of left foot (HCC)            HPI: New patient  Isabell Lopez is a 48 y.o. female with a history of RA, hypothyroidism multiple surgeries of left foot.  Patient referred to ID clinic after recent surgery on 8/7/2023 due to a chronic wound along her left foot.  Underwent of the left foot with toe fusion and hardware in place on 12/16/2019 which multiple revisions and hardware removal.  CT scan of the left foot on 7/17 finding consistent with infected hardware and osteomyelitis. OR 8/7/2023 I&D of the left foot, removal of internal fixation and bone biopsy.  All screws were removed from the plate and the plate was removed  OR cultures positive for MSSA.  Pathology report of left first metatarsal bone no evidence of acute osteomyelitis but chronic inflammation suggestive of osteomyelitis.  Patient was referred to ID clinic for antibiotic therapy    08/17/23- Today Patient reports feeling well. Pt stating that the wound is healing well. Pt being followed by [facility]. Wound pictures uploaded in EPIC. Denies drainage, pungent odor, redness, pain. Denies feeling generally ill, fevers/chills, general malaise, headache, n/v/d.  Stop Bactrim.  Start doxycycline 100 mg twice daily 6-week course end date 9/28/2023.  Surgical site without any surrounding erythema, swelling or drainage.  Sutures remain in place.  Complains of chronic skin irritant that has been intermittent for the past year.  Most likely this chronic skin irritant is due to her infection     Current Antimicrobials: Doxycycline 100 mg twice daily, end date 9/28/2023  Previous Antimicrobials: Bactrim    Other Current  Medications:  Home Medications    Medication Sig Taking? Last Dose Authorizing Provider   doxycycline (VIBRAMYCIN) 100 MG Tab Take 1 Tablet by mouth 2 times a day. Yes  HUMA Bain   Etanercept (ENBREL SURECLICK) 50 MG/ML Solution Auto-injector Inject 50 mg under the skin every 7 days. Yes Taking Nick Multani M.D.        PMH:  Past Medical History:   Diagnosis Date    Arthritis     Arthritis, rheumatoid (HCC)     Disorder of thyroid     hyperthyroid    RA (rheumatoid arthritis) ([facility]) 11/05/2013     Past Surgical History:   Procedure Laterality Date    IRRIGATION & DEBRIDEMENT GENERAL Left 8/7/2023    Procedure: FOOT IRRIGATION AND DEBRIDEMENT;  Surgeon: Roby Espinosa M.D.;  Location: SURGERY [facility];  Service: Orthopedics    HARDWARE REMOVAL ORTHO Left 8/7/2023    Procedure: REMOVAL, HARDWARE;  Surgeon: Roby Espinosa M.D.;  Location: [facility];  Service: Orthopedics    PB FUSION BIG TOE,MT-P JT Left 01/18/2023    Procedure: LEFT REVISION FIRST METATARSALPHALANGEAL JOINT FUSION;  Surgeon: Roby Espinosa M.D.;  Location: [facility];  Service: Orthopedics    PB REMV BONE FOR GRAFT MINOR Left 01/18/2023    Procedure: LEFT CALCANEAL BONE GRAFT;  Surgeon: Roby Espinosa M.D.;  Location: [facility];  Service: Orthopedics    PB REMOVAL DEEP IMPLANT Left 01/18/2023    Procedure: LEFT FIRST METATARSALPHALANGEAL JOINT HARDWARE REMOVAL;  Surgeon: Roby Espinosa M.D.;  Location: [facility];  Service: Orthopedics    PB FUSION BIG TOE,MT-P JT Left 03/02/2022    Procedure: LEFT FOOT FIRST METATARSOPHALANGEAL JOINT FUSION, LEFT RHEUMATOID FOREFOOT RECONSTRUCTION, REPAIRS AS INDICATED.;  Surgeon: Roby Espinosa M.D.;  Location: [facility];  Service: Orthopedics    TOE FUSION Right 12/16/2019    Procedure: FUSION, JOINT, TOE - FIRST METATARSOPHALANGEAL JOINT;  Surgeon: Roby Espinosa M.D.;  Location: SURGERY  [facility];  Service: Orthopedics    FOOT RECONSTRUCTION RHEUMATIC Right 12/16/2019    Procedure: RECONSTRUCTION, FOOT, FOR RHEUMATOID ARTHRITIS - FOREFOOT;  Surgeon: Roby Espinosa M.D.;  Location: SURGERY [facility];  Service: Orthopedics    ANKLE ARTHROSCOPY Right 12/16/2019    Procedure: ARTHROSCOPY, ANKLE;  Surgeon: Roby Espinosa M.D.;  Location: SURGERY [facility];  Service: Orthopedics    PRIMARY C SECTION  01/07/2019    Procedure: PRIMARY C SECTION;  Surgeon: Carlee Long M.D.;  Location: LABOR AND DELIVERY;  Service: Labor and Delivery    MAMMOPLASTY AUGMENTATION  07/12/2011    Performed by MARIBELL GONZALES at SURGERY [facility]     Family History   Problem Relation Age of Onset    Cancer Father         throat/prostate    Rheumatologic Disease Paternal Aunt     Other Paternal Aunt         MS    Rheumatologic Disease Paternal Aunt     Arthritis Paternal Aunt         RA     Social History     Socioeconomic History    Marital status:      Spouse name: Not on file    Number of children: Not on file    Years of education: Not on file    Highest education level: Not on file   Occupational History    Not on file   Tobacco Use    Smoking status: Never    Smokeless tobacco: Never   Vaping Use    Vaping Use: Never used   Substance and Sexual Activity    Alcohol use: Yes     Alcohol/week: 0.6 oz     Types: 1 Glasses of wine per week     Comment: 1/week    Drug use: Never    Sexual activity: Yes     Partners: Male   Other Topics Concern    Not on file   Social History Narrative    Not on file     Social Determinants of Health     Financial Resource Strain: Not on file   Food Insecurity: Not on file   Transportation Needs: Not on file   Physical Activity: Not on file   Stress: Not on file   Social Connections: Not on file   Intimate Partner Violence: Not on file   Housing Stability: Not on file           Allergies/Intolerances:  No Known Allergies    ROS:   Review of Systems  "  Constitutional:  Negative for chills, fever, malaise/fatigue and weight loss.   HENT:  Negative for congestion and hearing loss.    Eyes:  Negative for blurred vision and double vision.   Respiratory:  Negative for cough, sputum production, shortness of breath and wheezing.    Cardiovascular:  Negative for chest pain and palpitations.   Gastrointestinal:  Negative for abdominal pain, constipation, diarrhea, nausea and vomiting.   Genitourinary:  Negative for dysuria.   Musculoskeletal:  Negative for myalgias.        Left foot surgical wound   Skin:  Positive for rash. Negative for itching.   Neurological:  Negative for dizziness, focal weakness and headaches.   Endo/Heme/Allergies:  Does not bruise/bleed easily.   Psychiatric/Behavioral:  Negative for depression. The patient is not nervous/anxious.       ROS was reviewed and were negative except as above.    Objective    Most Recent Vital Signs:  Blood Pressure 108/80 (BP Location: Left arm, Patient Position: Sitting, BP Cuff Size: Adult)   Pulse 95   Temperature 36.4 °C (97.6 °F) (Temporal)   Respiration 16   Height 1.778 m (5' 10\")   Weight 67.1 kg (147 lb 14.9 oz)   Last Menstrual Period 07/07/2023   Oxygen Saturation 96%   Body Mass Index 21.23 kg/m²     Physical Exam:  Physical Exam  Vitals and nursing note reviewed.   Constitutional:       General: She is not in acute distress.     Appearance: Normal appearance. She is not ill-appearing.   HENT:      Head: Normocephalic and atraumatic.      Nose: Nose normal.      Mouth/Throat:      Mouth: Mucous membranes are moist.   Eyes:      Pupils: Pupils are equal, round, and reactive to light.   Cardiovascular:      Rate and Rhythm: Normal rate and regular rhythm.   Pulmonary:      Effort: Pulmonary effort is normal. No respiratory distress.      Breath sounds: Normal breath sounds. No stridor.   Musculoskeletal:      Cervical back: Normal range of motion.      Right lower leg: No edema.      Left lower leg: " No edema.      Comments: Left foot first digit surgical wound with remaining sutures in place.  Wound edges well approximated.  No surrounding erythema or drainage.  Mild swelling.  See clinical photo   Skin:     General: Skin is warm and dry.      Capillary Refill: Capillary refill takes less than 2 seconds.      Coloration: Skin is not jaundiced or pale.   Neurological:      General: No focal deficit present.      Mental Status: She is alert and oriented to person, place, and time.   Psychiatric:         Mood and Affect: Mood normal.         Behavior: Behavior normal.          Pertinent Lab/Imaging Results:  [unfilled]  @CMP@  WBC   Date/Time Value Ref Range Status   03/09/2021 09:56 AM 7.3 4.8 - 10.8 K/uL Final     RBC   Date/Time Value Ref Range Status   03/09/2021 09:56 AM 5.92 (H) 4.20 - 5.40 M/uL Final     Hemoglobin   Date/Time Value Ref Range Status   03/09/2021 09:56 AM 14.0 12.0 - 16.0 g/dL Final     Hematocrit   Date/Time Value Ref Range Status   03/09/2021 09:56 AM 46.0 37.0 - 47.0 % Final     MCV   Date/Time Value Ref Range Status   03/09/2021 09:56 AM 77.7 (L) 81.4 - 97.8 fL Final     MCH   Date/Time Value Ref Range Status   03/09/2021 09:56 AM 23.6 (L) 27.0 - 33.0 pg Final     MCHC   Date/Time Value Ref Range Status   03/09/2021 09:56 AM 30.4 (L) 33.6 - 35.0 g/dL Final     MPV   Date/Time Value Ref Range Status   03/09/2021 09:56 AM 11.0 9.0 - 12.9 fL Final      Sodium   Date/Time Value Ref Range Status   03/09/2021 09:56  135 - 145 mmol/L Final     Potassium   Date/Time Value Ref Range Status   03/09/2021 09:56 AM 3.7 3.6 - 5.5 mmol/L Final     Chloride   Date/Time Value Ref Range Status   03/09/2021 09:56  96 - 112 mmol/L Final     Co2   Date/Time Value Ref Range Status   03/09/2021 09:56 AM 25 20 - 33 mmol/L Final     Glucose   Date/Time Value Ref Range Status   03/09/2021 09:56 AM 96 65 - 99 mg/dL Final     Bun   Date/Time Value Ref Range Status   03/09/2021 09:56 AM 10 8 - 22 mg/dL  "Final     Creatinine   Date/Time Value Ref Range Status   03/09/2021 09:56 AM 0.35 (L) 0.50 - 1.40 mg/dL Final     Alkaline Phosphatase   Date/Time Value Ref Range Status   03/09/2021 09:56 AM 98 30 - 99 U/L Final     AST(SGOT)   Date/Time Value Ref Range Status   03/09/2021 09:56 AM 18 12 - 45 U/L Final     ALT(SGPT)   Date/Time Value Ref Range Status   03/09/2021 09:56 AM 12 2 - 50 U/L Final     Total Bilirubin   Date/Time Value Ref Range Status   03/09/2021 09:56 AM 0.6 0.1 - 1.5 mg/dL Final      No results found for: \"CPKTOTAL\"       No results found for: \"BLOODCULTU\", \"BLDCULT\", \"BCHOLD\"    No results found for: \"BLOODCULTU\", \"BLDCULT\", \"BCHOLD\"          DEPARTMENT OF PATHOLOGY                    [address] [place] [address]  [address]               Phone [phone]          Fax [phone]    Jessica Choudhury M.D.     Ashley Carnes M.D.     Emy Powell M.D.      TIERRA Mcadams.O.     TIERRA Gallegos.O.     TIERRA Macias.ANGELICA.     Patient:    MICH STALLINGS      Specimen    [id number]                                            #:       Copies to:             CANDIDA MCKOY MD                         SURGICAL PATHOLOGY CONSULTATION       FINAL DIAGNOSIS:     A. Left first metatarsal bone:          Partially crushed bone with fibrosis and chronic inflammation.          No evidence of acute osteomyelitis.     Comment: The case is also reviewed with Dr. Choudhury who concurs.                                         Diagnosis performed by:                                       EMY POWELL MD     CULTURE TISSUE W/ GRM STAIN  Order: [id number]  Status: Final result     Visible to patient: Yes (seen)     Next appt: Today at 10:00 AM in Infectious Diseases (Torsten Barakat A.P.R.N.)     Specimen Information: Tissue   0 Result Notes      Component 10 d ago   Significant Indicator POS Positive (POS)    Source TISS    Site Left Toe    Culture Result - " Abnormal     Gram Stain Result  Abnormal   Few WBCs.   Moderate Gram positive cocci in clusters.     Culture Result  Abnormal   Staphylococcus aureus   Light growth     Resulting Agency M        Susceptibility     Staphylococcus aureus     KEILY     Ampicillin/sulbactam <=8/4 mcg/mL Sensitive     Azithromycin <=2 mcg/mL Sensitive     Cefazolin <=8 mcg/mL Sensitive     Cefepime <=4 mcg/mL Sensitive     Clindamycin 0.5 mcg/mL Sensitive     Daptomycin 1 mcg/mL Sensitive     Erythromycin <=0.25 mcg/mL Sensitive     Oxacillin 0.5 mcg/mL Sensitive     Pip/Tazobactam <=8 mcg/mL Sensitive     Tetracycline <=4 mcg/mL Sensitive     Trimeth/Sulfa <=0.5/9.5 m... Sensitive     Vancomycin 1 mcg/mL Sensitive                 Narrative  Performed by: M  Surgery Specimen      Specimen Collected: 08/07/23  6:31 PM Last Resulted: 08/10/23  1:46 PM              Impression/Assessment      1. MSSA (methicillin susceptible Staphylococcus aureus) infection  doxycycline (VIBRAMYCIN) 100 MG Tab    CBC WITH DIFFERENTIAL    Comp Metabolic Panel      2. Subacute osteomyelitis of left foot (HCC)            Isabell Lopez is a 48 y.o. female with a history of RA, hypothyroidism multiple surgeries of left foot.  Patient referred to ID clinic after recent surgery on 8/7/2023 due to a chronic wound along her left foot.  Underwent of the left foot with toe fusion and hardware in place on 12/16/2019 which multiple revisions and hardware removal.  CT scan of the left foot on 7/17 finding consistent with infected hardware and osteomyelitis. OR 8/7/2023 I&D of the left foot, removal of internal fixation and bone biopsy.  All screws were removed from the plate and the plate was removed.  Previous antibiotic therapy with Bactrim.  No antibiotics prior to surgery. OR cultures positive for MSSA.  Pathology report of left first metatarsal bone no evidence of acute osteomyelitis but chronic inflammation suggestive of osteomyelitis.  Patient was referred  to ID clinic for antibiotic therapy    08/17/23- Pt being followed by [facility]. Wound pictures uploaded in EPIC. Stop Bactrim.  Start doxycycline 100 mg twice daily 6-week course end date 9/28/2023.  Surgical site without any surrounding erythema, swelling or drainage.  Sutures remain in place.  Complains of chronic skin irritant that has been intermittent for the past year.  Most likely this chronic skin irritant is due to her infection   PLAN:   - Start p.o. doxycycline 100 mg twice daily, 6-week course for osteomyelitis end date 9/28/23  - Repeat Labs CBC/CMP today and every 2 weeks, standing order   - Medication education provided and S/S of side effects discussed   - Recommend routine follow up with orthopedic surgery  - Continue wound care to left foot  - Recommended to start po probiotic  - Education provided on S/S of infection and when to report to ER/ call 911     Return visit: 2 weeks. Follow up with primary care physician for chronic medical problems      I have performed a physical exam,  updated ROS and plan today. I have reviewed previous images, labs, and provider notes.      ELIEZER Bain.    All Patients should seek medical re-evaluation or report to the ER for new, increasing or worsening symptoms. In some circumstances medical conditions can change from the initial evaluation and may require emergent medical re-evaluation. This includes but is not limited to chest pain, shortness of breath, atypical abdominal pain, atypical headache, ALOC, fever >101, low blood pressure, high respiratory rate (above 30), low oxygen saturation (below 90%), acute delirium, abnormal bleeding, inability to tolerate any intake, weakness on one side of the body, any worsened or concerning conditions.    Please note that this dictation was created using voice recognition software. I have worked with technical experts from [employer] to optimize the interface.  I have made every reasonable attempt to correct  obvious errors, but there may be errors of grammar and possibly content that I did not discover before finalizing the note.

## 2023-08-30 ASSESSMENT — ENCOUNTER SYMPTOMS
CONSTIPATION: 0
NERVOUS/ANXIOUS: 0
DIARRHEA: 0
CHILLS: 0
SHORTNESS OF BREATH: 0
DIZZINESS: 0
BLURRED VISION: 0
FOCAL WEAKNESS: 0
COUGH: 0
NAUSEA: 0
WEIGHT LOSS: 0
DOUBLE VISION: 0
PALPITATIONS: 0
HEADACHES: 0
ABDOMINAL PAIN: 0
VOMITING: 0
WHEEZING: 0
FEVER: 0
DEPRESSION: 0
SPUTUM PRODUCTION: 0
MYALGIAS: 0
BRUISES/BLEEDS EASILY: 0

## 2023-08-30 NOTE — PROGRESS NOTES
INFECTIOUS  DISEASE  OUTPATIENT CLINIC  NOTE   Subjective   Primary care provider: Jenniffer Bermudez M.D..     Reason for Follow Up:   Follow-up for   1. MSSA (methicillin susceptible Staphylococcus aureus) infection        2. Subacute osteomyelitis of left foot (HCC)            HPI: New patient  Isabell Lopez is a 48 y.o. female with a history of RA, hypothyroidism multiple surgeries of left foot.  Patient referred to ID clinic after recent surgery on 8/7/2023 due to a chronic wound along her left foot.  Underwent of the left foot with toe fusion and hardware in place on 12/16/2019 which multiple revisions and hardware removal.  CT scan of the left foot on 7/17 finding consistent with infected hardware and osteomyelitis. OR 8/7/2023 I&D of the left foot, removal of internal fixation and bone biopsy.  All screws were removed from the plate and the plate was removed  OR cultures positive for MSSA.  Pathology report of left first metatarsal bone no evidence of acute osteomyelitis but chronic inflammation suggestive of osteomyelitis.  Patient was referred to ID clinic for antibiotic therapy    08/17/23- Today Patient reports feeling well. Pt stating that the wound is healing well. Pt being followed by the [facility]. Wound pictures uploaded in EPIC. Denies drainage, pungent odor, redness, pain. Denies feeling generally ill, fevers/chills, general malaise, headache, n/v/d.  Stop Bactrim.  Start doxycycline 100 mg twice daily 6-week course end date 9/28/2023.  Surgical site without any surrounding erythema, swelling or drainage.  Sutures remain in place.  Complains of chronic skin irritant that has been intermittent for the past year.  Most likely this chronic skin irritant is due to her infection     8/31/23- This evaluation was conducted via Zoom using secure and encrypted videoconferencing technology. The patient was in their home in the [facility].    The patient's identity was confirmed and verbal consent was  obtained for this virtual visit.  patient reports that she is feeling well and her foot is healing appropriately.  She denies any nausea, vomiting, fever, chills, constipation or diarrhea.  She denies any drainage or purulent odor or redness or pain from the foot.  Mild soft tissue swelling along the great toe.  She continues follow-up with orthopedic surgery on a as needed basis.  Recently had sutures removed and Steri-Strips placed.  She has been compliant with her oral doxycycline with no complaints of side effects.  Most recent lab work reviewed from 8/17/2023, WBC WNL 7.7, platelets WNL, neutrophils WNL, CMP mostly unremarkable creatinine/GFR WNL, LFTs WNL.  Continue with p.o. doxycycline 100 mg twice daily until end date of 9/28/2023.  Repeat labs in 2 weeks.  Follow-up at end of course of antibiotics.    Current Antimicrobials: Doxycycline 100 mg twice daily, end date 9/28/2023  Previous Antimicrobials: Bactrim    Other Current Medications:  Home Medications    Medication Sig Taking? Last Dose Authorizing Provider   doxycycline (VIBRAMYCIN) 100 MG Tab Take 1 Tablet by mouth 2 times a day. Yes Taking Torsten Barakat A.P.R.N.   Etanercept (ENBREL SURECLICK) 50 MG/ML Solution Auto-injector Inject 50 mg under the skin every 7 days. Yes Taking Nick Multani M.D.        PMH:  Past Medical History:   Diagnosis Date    Arthritis     Arthritis, rheumatoid (HCC)     Disorder of thyroid     hyperthyroid    RA (rheumatoid arthritis) ([facility]) 11/05/2013     Past Surgical History:   Procedure Laterality Date    IRRIGATION & DEBRIDEMENT GENERAL Left 8/7/2023    Procedure: FOOT IRRIGATION AND DEBRIDEMENT;  Surgeon: Roby Espinosa M.D.;  Location: SURGERY [facility];  Service: Orthopedics    HARDWARE REMOVAL ORTHO Left 8/7/2023    Procedure: REMOVAL, HARDWARE;  Surgeon: Roby Espinosa M.D.;  Location: SURGERY [facility];  Service: Orthopedics    PB FUSION BIG TOE,MT-P JT Left 01/18/2023    Procedure: LEFT REVISION  FIRST METATARSALPHALANGEAL JOINT FUSION;  Surgeon: Roby Espinosa M.D.;  Location: [facility];  Service: Orthopedics    PB REMV BONE FOR GRAFT MINOR Left 01/18/2023    Procedure: LEFT CALCANEAL BONE GRAFT;  Surgeon: Roby Espinosa M.D.;  Location: [facility];  Service: Orthopedics    PB REMOVAL DEEP IMPLANT Left 01/18/2023    Procedure: LEFT FIRST METATARSALPHALANGEAL JOINT HARDWARE REMOVAL;  Surgeon: Roby Espinosa M.D.;  Location: [facility];  Service: Orthopedics    PB FUSION BIG TOE,MT-P JT Left 03/02/2022    Procedure: LEFT FOOT FIRST METATARSOPHALANGEAL JOINT FUSION, LEFT RHEUMATOID FOREFOOT RECONSTRUCTION, REPAIRS AS INDICATED.;  Surgeon: Roby Espinosa M.D.;  Location: [facility];  Service: Orthopedics    TOE FUSION Right 12/16/2019    Procedure: FUSION, JOINT, TOE - FIRST METATARSOPHALANGEAL JOINT;  Surgeon: Roby Espinosa M.D.;  Location: SURGERY [facility];  Service: Orthopedics    FOOT RECONSTRUCTION RHEUMATIC Right 12/16/2019    Procedure: RECONSTRUCTION, FOOT, FOR RHEUMATOID ARTHRITIS - FOREFOOT;  Surgeon: Roby Espinosa M.D.;  Location: SURGERY [facility];  Service: Orthopedics    ANKLE ARTHROSCOPY Right 12/16/2019    Procedure: ARTHROSCOPY, ANKLE;  Surgeon: Roby Espinosa M.D.;  Location: SURGERY [facility];  Service: Orthopedics    PRIMARY C SECTION  01/07/2019    Procedure: PRIMARY C SECTION;  Surgeon: Carlee Long M.D.;  Location: LABOR AND DELIVERY;  Service: Labor and Delivery    MAMMOPLASTY AUGMENTATION  07/12/2011    Performed by MARIBELL GONZALES at [facility]     Family History   Problem Relation Age of Onset    Cancer Father         throat/prostate    Rheumatologic Disease Paternal Aunt     Other Paternal Aunt         MS    Rheumatologic Disease Paternal Aunt     Arthritis Paternal Aunt         RA     Social History     Socioeconomic History    Marital status:  "     Spouse name: Not on file    Number of children: Not on file    Years of education: Not on file    Highest education level: Not on file   Occupational History    Not on file   Tobacco Use    Smoking status: Never     Passive exposure: Never    Smokeless tobacco: Never   Vaping Use    Vaping Use: Never used   Substance and Sexual Activity    Alcohol use: Yes     Alcohol/week: 0.6 oz     Types: 1 Glasses of wine per week     Comment: 1/week    Drug use: Never    Sexual activity: Yes     Partners: Male   Other Topics Concern    Not on file   Social History Narrative    Not on file     Social Determinants of Health     Financial Resource Strain: Not on file   Food Insecurity: Not on file   Transportation Needs: Not on file   Physical Activity: Not on file   Stress: Not on file   Social Connections: Not on file   Intimate Partner Violence: Not on file   Housing Stability: Not on file           Allergies/Intolerances:  No Known Allergies    ROS:   Review of Systems   Constitutional:  Negative for chills, fever, malaise/fatigue and weight loss.   HENT:  Negative for congestion and hearing loss.    Eyes:  Negative for blurred vision and double vision.   Respiratory:  Negative for cough, sputum production, shortness of breath and wheezing.    Cardiovascular:  Negative for chest pain and palpitations.   Gastrointestinal:  Negative for abdominal pain, constipation, diarrhea, nausea and vomiting.   Genitourinary:  Negative for dysuria.   Musculoskeletal:  Negative for myalgias.        Left foot surgical wound   Skin:  Positive for rash. Negative for itching.   Neurological:  Negative for dizziness, focal weakness and headaches.   Endo/Heme/Allergies:  Does not bruise/bleed easily.   Psychiatric/Behavioral:  Negative for depression. The patient is not nervous/anxious.       ROS was reviewed and were negative except as above.    Objective    Most Recent Vital Signs:  Height 1.778 m (5' 10\") Comment: at last visit  " Weight 66.7 kg (147 lb) Comment: at last visit  Last Menstrual Period 07/07/2023   Body Mass Index 21.09 kg/m²     Physical Exam:       Pertinent Lab/Imaging Results:  [unfilled]  @CMP@  WBC   Date/Time Value Ref Range Status   08/17/2023 12:00 PM 7.7 4.8 - 10.8 K/uL Final     RBC   Date/Time Value Ref Range Status   08/17/2023 12:00 PM 5.57 (H) 4.20 - 5.40 M/uL Final     Hemoglobin   Date/Time Value Ref Range Status   08/17/2023 12:00 PM 14.7 12.0 - 16.0 g/dL Final     Hematocrit   Date/Time Value Ref Range Status   08/17/2023 12:00 PM 46.9 37.0 - 47.0 % Final     MCV   Date/Time Value Ref Range Status   08/17/2023 12:00 PM 84.2 81.4 - 97.8 fL Final     MCH   Date/Time Value Ref Range Status   08/17/2023 12:00 PM 26.4 (L) 27.0 - 33.0 pg Final     MCHC   Date/Time Value Ref Range Status   08/17/2023 12:00 PM 31.3 (L) 32.2 - 35.5 g/dL Final     Comment:     Please note new reference range effective 05/22/2023.     MPV   Date/Time Value Ref Range Status   08/17/2023 12:00 PM 10.6 9.0 - 12.9 fL Final      Sodium   Date/Time Value Ref Range Status   08/17/2023 12:00  135 - 145 mmol/L Final     Potassium   Date/Time Value Ref Range Status   08/17/2023 12:00 PM 4.7 3.6 - 5.5 mmol/L Final     Chloride   Date/Time Value Ref Range Status   08/17/2023 12:00  96 - 112 mmol/L Final     Co2   Date/Time Value Ref Range Status   08/17/2023 12:00 PM 25 20 - 33 mmol/L Final     Glucose   Date/Time Value Ref Range Status   08/17/2023 12:00  (H) 65 - 99 mg/dL Final     Bun   Date/Time Value Ref Range Status   08/17/2023 12:00 PM 14 8 - 22 mg/dL Final     Creatinine   Date/Time Value Ref Range Status   08/17/2023 12:00 PM 0.74 0.50 - 1.40 mg/dL Final     Alkaline Phosphatase   Date/Time Value Ref Range Status   08/17/2023 12:00 PM 68 30 - 99 U/L Final     AST(SGOT)   Date/Time Value Ref Range Status   08/17/2023 12:00 PM 18 12 - 45 U/L Final     ALT(SGPT)   Date/Time Value Ref Range Status   08/17/2023 12:00 PM 14 2  "- 50 U/L Final     Total Bilirubin   Date/Time Value Ref Range Status   08/17/2023 12:00 PM 0.6 0.1 - 1.5 mg/dL Final      No results found for: \"CPKTOTAL\"       No results found for: \"BLOODCULTU\", \"BLDCULT\", \"BCHOLD\"    No results found for: \"BLOODCULTU\", \"BLDCULT\", \"BCHOLD\"          DEPARTMENT OF PATHOLOGY                    [address] [place] [address]  [id number]               Phone [phone]          Fax [phone]    Jessica Choudhury M.D.     Ashley Carnes M.D.     Emy Powell M.D.      Mckenna Michele D.O.     Paulo Hein D.O.     TIERRA Macias.O.     Patient:    MICH STALLINGS      Specimen    [id number]                                            #:       Copies to:             CANDIDA MCKOY MD                         SURGICAL PATHOLOGY CONSULTATION       FINAL DIAGNOSIS:     A. Left first metatarsal bone:          Partially crushed bone with fibrosis and chronic inflammation.          No evidence of acute osteomyelitis.     Comment: The case is also reviewed with Dr. Choudhury who concurs.                                         Diagnosis performed by:                                       EMY POWELL MD     CULTURE TISSUE W/ GRM STAIN  Order: [id number]  Status: Final result     Visible to patient: Yes (seen)     Next appt: Today at 10:00 AM in Infectious Diseases (Torsten Barakta, A.P.R.N.)     Specimen Information: Tissue   0 Result Notes      Component 10 d ago   Significant Indicator POS Positive (POS)    Source TISS    Site Left Toe    Culture Result - Abnormal     Gram Stain Result  Abnormal   Few WBCs.   Moderate Gram positive cocci in clusters.     Culture Result  Abnormal   Staphylococcus aureus   Light growth     Resulting Agency M        Susceptibility     Staphylococcus aureus     KEILY     Ampicillin/sulbactam <=8/4 mcg/mL Sensitive     Azithromycin <=2 mcg/mL Sensitive     Cefazolin <=8 mcg/mL Sensitive     Cefepime <=4 " mcg/mL Sensitive     Clindamycin 0.5 mcg/mL Sensitive     Daptomycin 1 mcg/mL Sensitive     Erythromycin <=0.25 mcg/mL Sensitive     Oxacillin 0.5 mcg/mL Sensitive     Pip/Tazobactam <=8 mcg/mL Sensitive     Tetracycline <=4 mcg/mL Sensitive     Trimeth/Sulfa <=0.5/9.5 m... Sensitive     Vancomycin 1 mcg/mL Sensitive                 Narrative  Performed by: M  Surgery Specimen      Specimen Collected: 08/07/23  6:31 PM Last Resulted: 08/10/23  1:46 PM              Impression/Assessment      1. MSSA (methicillin susceptible Staphylococcus aureus) infection        2. Subacute osteomyelitis of left foot (HCC)            Isabell Lopez is a 48 y.o. female with a history of RA, hypothyroidism multiple surgeries of left foot.  Patient referred to ID clinic after recent surgery on 8/7/2023 due to a chronic wound along her left foot.  Underwent of the left foot with toe fusion and hardware in place on 12/16/2019 which multiple revisions and hardware removal.  CT scan of the left foot on 7/17 finding consistent with infected hardware and osteomyelitis. OR 8/7/2023 I&D of the left foot, removal of internal fixation and bone biopsy.  All screws were removed from the plate and the plate was removed.  Previous antibiotic therapy with Bactrim.  No antibiotics prior to surgery. OR cultures positive for MSSA.  Pathology report of left first metatarsal bone no evidence of acute osteomyelitis but chronic inflammation suggestive of osteomyelitis.  Start doxycycline 100 mg twice daily 6-week course end date 9/28/2023.  Surgical site without any surrounding erythema, swelling or drainage.  Sutures remain in place.  Complains of chronic skin irritant that has been intermittent for the past year.  Most likely this chronic skin irritant is due to her infection     8/31/23-patient reports that she is feeling well and her foot is healing appropriately.   Mild soft tissue swelling along the great toe  She continues follow-up with  orthopedic surgery on a as needed basis.  Recently had sutures removed and Steri-Strips placed.  She has been compliant with her oral doxycycline with no complaints of side effects.  Continue with p.o. doxycycline 100 mg twice daily until end date of 9/28/2023. Follow-up at end of course of antibiotics.    PLAN:   - Continue p.o. doxycycline 100 mg twice daily, 6-week course for osteomyelitis end date 9/28/23  - Repeat Labs CBC/CMP today and every 2 weeks, standing order. Most recent lab work reviewed from 8/17/2023, WBC WNL 7.7, platelets WNL, neutrophils WNL, CMP mostly unremarkable creatinine/GFR WNL, LFTs WNL.  - Medication education provided and S/S of side effects discussed   - Recommend routine follow up with orthopedic surgery  - Continue wound care to left foot  - Continue po probiotic  - Education provided on S/S of infection and when to report to ER/ call [phone]     Return visit: 1 month. Follow up with primary care physician for chronic medical problems      I have performed a physical exam,  updated ROS and plan today. I have reviewed previous images, labs, and provider notes.      Torsten Barakat A.P.R.CHARU.    All Patients should seek medical re-evaluation or report to the ER for new, increasing or worsening symptoms. In some circumstances medical conditions can change from the initial evaluation and may require emergent medical re-evaluation. This includes but is not limited to chest pain, shortness of breath, atypical abdominal pain, atypical headache, ALOC, fever >101, low blood pressure, high respiratory rate (above 30), low oxygen saturation (below 90%), acute delirium, abnormal bleeding, inability to tolerate any intake, weakness on one side of the body, any worsened or concerning conditions.    Please note that this dictation was created using voice recognition software. I have worked with technical experts from [employer] to optimize the interface.  I have made every reasonable attempt to correct  obvious errors, but there may be errors of grammar and possibly content that I did not discover before finalizing the note.

## 2023-08-31 ENCOUNTER — TELEMEDICINE (OUTPATIENT)
Dept: INFECTIOUS DISEASES | Facility: MEDICAL CENTER | Age: 48
End: 2023-08-31
Attending: NURSE PRACTITIONER
Payer: COMMERCIAL

## 2023-08-31 VITALS — BODY MASS INDEX: 21.05 KG/M2 | WEIGHT: 147 LBS | HEIGHT: 70 IN

## 2023-08-31 DIAGNOSIS — M86.272 SUBACUTE OSTEOMYELITIS OF LEFT FOOT (HCC): ICD-10-CM

## 2023-08-31 DIAGNOSIS — A49.01 MSSA (METHICILLIN SUSCEPTIBLE STAPHYLOCOCCUS AUREUS) INFECTION: ICD-10-CM

## 2023-08-31 PROCEDURE — 99211 OFF/OP EST MAY X REQ PHY/QHP: CPT | Performed by: NURSE PRACTITIONER

## 2023-08-31 PROCEDURE — 99213 OFFICE O/P EST LOW 20 MIN: CPT | Mod: 95 | Performed by: NURSE PRACTITIONER

## 2023-08-31 ASSESSMENT — FIBROSIS 4 INDEX: FIB4 SCORE: 0.53

## 2023-09-05 LAB
FUNGUS SPEC CULT: NORMAL
FUNGUS SPEC FUNGUS STN: NORMAL
SIGNIFICANT IND 70042: NORMAL
SITE SITE: NORMAL
SOURCE SOURCE: NORMAL

## 2023-09-20 LAB
MYCOBACTERIUM SPEC CULT: NORMAL
RHODAMINE-AURAMINE STN SPEC: NORMAL
SIGNIFICANT IND 70042: NORMAL
SITE SITE: NORMAL
SOURCE SOURCE: NORMAL

## 2024-01-10 ENCOUNTER — HOSPITAL ENCOUNTER (OUTPATIENT)
Dept: LAB | Facility: MEDICAL CENTER | Age: 49
End: 2024-01-10
Attending: STUDENT IN AN ORGANIZED HEALTH CARE EDUCATION/TRAINING PROGRAM
Payer: COMMERCIAL

## 2024-01-10 ENCOUNTER — OFFICE VISIT (OUTPATIENT)
Dept: RHEUMATOLOGY | Facility: MEDICAL CENTER | Age: 49
End: 2024-01-10
Attending: STUDENT IN AN ORGANIZED HEALTH CARE EDUCATION/TRAINING PROGRAM
Payer: COMMERCIAL

## 2024-01-10 VITALS
SYSTOLIC BLOOD PRESSURE: 120 MMHG | HEIGHT: 69 IN | OXYGEN SATURATION: 99 % | TEMPERATURE: 97.3 F | WEIGHT: 159.4 LBS | BODY MASS INDEX: 23.61 KG/M2 | HEART RATE: 83 BPM | DIASTOLIC BLOOD PRESSURE: 78 MMHG

## 2024-01-10 DIAGNOSIS — R76.8 SEROLOGIC AUTOIMMUNITY: ICD-10-CM

## 2024-01-10 DIAGNOSIS — M05.80 SEROPOSITIVE EROSIVE RHEUMATOID ARTHRITIS (HCC): ICD-10-CM

## 2024-01-10 DIAGNOSIS — Z79.899 LONG-TERM USE OF HYDROXYCHLOROQUINE: ICD-10-CM

## 2024-01-10 DIAGNOSIS — D84.9 IMMUNOSUPPRESSED STATUS (HCC): ICD-10-CM

## 2024-01-10 LAB
ALBUMIN SERPL BCP-MCNC: 4.2 G/DL (ref 3.2–4.9)
ALBUMIN/GLOB SERPL: 1.3 G/DL
ALP SERPL-CCNC: 52 U/L (ref 30–99)
ALT SERPL-CCNC: 15 U/L (ref 2–50)
ANION GAP SERPL CALC-SCNC: 11 MMOL/L (ref 7–16)
AST SERPL-CCNC: 19 U/L (ref 12–45)
BASOPHILS # BLD AUTO: 1 % (ref 0–1.8)
BASOPHILS # BLD: 0.09 K/UL (ref 0–0.12)
BILIRUB SERPL-MCNC: 0.7 MG/DL (ref 0.1–1.5)
BUN SERPL-MCNC: 17 MG/DL (ref 8–22)
CALCIUM ALBUM COR SERPL-MCNC: 8.9 MG/DL (ref 8.5–10.5)
CALCIUM SERPL-MCNC: 9.1 MG/DL (ref 8.5–10.5)
CHLORIDE SERPL-SCNC: 102 MMOL/L (ref 96–112)
CO2 SERPL-SCNC: 23 MMOL/L (ref 20–33)
CREAT SERPL-MCNC: 0.55 MG/DL (ref 0.5–1.4)
CRP SERPL HS-MCNC: <0.3 MG/DL (ref 0–0.75)
EOSINOPHIL # BLD AUTO: 0.51 K/UL (ref 0–0.51)
EOSINOPHIL NFR BLD: 5.7 % (ref 0–6.9)
ERYTHROCYTE [DISTWIDTH] IN BLOOD BY AUTOMATED COUNT: 44.2 FL (ref 35.9–50)
ERYTHROCYTE [SEDIMENTATION RATE] IN BLOOD BY WESTERGREN METHOD: 5 MM/HOUR (ref 0–25)
GFR SERPLBLD CREATININE-BSD FMLA CKD-EPI: 114 ML/MIN/1.73 M 2
GLOBULIN SER CALC-MCNC: 3.2 G/DL (ref 1.9–3.5)
GLUCOSE SERPL-MCNC: 73 MG/DL (ref 65–99)
HBV SURFACE AG SER QL: NORMAL
HCT VFR BLD AUTO: 45.7 % (ref 37–47)
HCV AB SER QL: NORMAL
HGB BLD-MCNC: 14.9 G/DL (ref 12–16)
IMM GRANULOCYTES # BLD AUTO: 0.03 K/UL (ref 0–0.11)
IMM GRANULOCYTES NFR BLD AUTO: 0.3 % (ref 0–0.9)
LYMPHOCYTES # BLD AUTO: 1.95 K/UL (ref 1–4.8)
LYMPHOCYTES NFR BLD: 21.9 % (ref 22–41)
MCH RBC QN AUTO: 27.6 PG (ref 27–33)
MCHC RBC AUTO-ENTMCNC: 32.6 G/DL (ref 32.2–35.5)
MCV RBC AUTO: 84.8 FL (ref 81.4–97.8)
MONOCYTES # BLD AUTO: 0.57 K/UL (ref 0–0.85)
MONOCYTES NFR BLD AUTO: 6.4 % (ref 0–13.4)
NEUTROPHILS # BLD AUTO: 5.74 K/UL (ref 1.82–7.42)
NEUTROPHILS NFR BLD: 64.7 % (ref 44–72)
NRBC # BLD AUTO: 0 K/UL
NRBC BLD-RTO: 0 /100 WBC (ref 0–0.2)
PLATELET # BLD AUTO: 328 K/UL (ref 164–446)
PMV BLD AUTO: 11.2 FL (ref 9–12.9)
POTASSIUM SERPL-SCNC: 4.2 MMOL/L (ref 3.6–5.5)
PROT SERPL-MCNC: 7.4 G/DL (ref 6–8.2)
RBC # BLD AUTO: 5.39 M/UL (ref 4.2–5.4)
SODIUM SERPL-SCNC: 136 MMOL/L (ref 135–145)
WBC # BLD AUTO: 8.9 K/UL (ref 4.8–10.8)

## 2024-01-10 PROCEDURE — 86480 TB TEST CELL IMMUN MEASURE: CPT

## 2024-01-10 PROCEDURE — 3078F DIAST BP <80 MM HG: CPT | Performed by: STUDENT IN AN ORGANIZED HEALTH CARE EDUCATION/TRAINING PROGRAM

## 2024-01-10 PROCEDURE — 80053 COMPREHEN METABOLIC PANEL: CPT

## 2024-01-10 PROCEDURE — 99211 OFF/OP EST MAY X REQ PHY/QHP: CPT | Performed by: INTERNAL MEDICINE

## 2024-01-10 PROCEDURE — 86803 HEPATITIS C AB TEST: CPT

## 2024-01-10 PROCEDURE — 99214 OFFICE O/P EST MOD 30 MIN: CPT | Performed by: STUDENT IN AN ORGANIZED HEALTH CARE EDUCATION/TRAINING PROGRAM

## 2024-01-10 PROCEDURE — 3074F SYST BP LT 130 MM HG: CPT | Performed by: STUDENT IN AN ORGANIZED HEALTH CARE EDUCATION/TRAINING PROGRAM

## 2024-01-10 PROCEDURE — 87340 HEPATITIS B SURFACE AG IA: CPT

## 2024-01-10 PROCEDURE — 85652 RBC SED RATE AUTOMATED: CPT

## 2024-01-10 PROCEDURE — 36415 COLL VENOUS BLD VENIPUNCTURE: CPT

## 2024-01-10 PROCEDURE — 85025 COMPLETE CBC W/AUTO DIFF WBC: CPT

## 2024-01-10 PROCEDURE — 86140 C-REACTIVE PROTEIN: CPT

## 2024-01-10 RX ORDER — MEDROXYPROGESTERONE ACETATE 150 MG/ML
50 INJECTION, SUSPENSION INTRAMUSCULAR
Qty: 4 EACH | Refills: 5 | Status: SHIPPED | OUTPATIENT
Start: 2024-01-10

## 2024-01-10 ASSESSMENT — PATIENT HEALTH QUESTIONNAIRE - PHQ9: CLINICAL INTERPRETATION OF PHQ2 SCORE: 0

## 2024-01-10 ASSESSMENT — FIBROSIS 4 INDEX: FIB4 SCORE: 0.53

## 2024-01-10 NOTE — PATIENT INSTRUCTIONS
AFTER VISIT INSTRUCTIONS    Below are important information to help you navigate your healthcare needs and help us serve you safely and effectively:  If laboratory tests and/or imaging studies were ordered, remember to go get them done as instructed.  If new prescriptions and/or refills were sent, remember to go pick them up from your local pharmacy, or call the specialty pharmacy to request shipment.  Always take your prescription medications exactly as prescribed unless instructed otherwise.  Note that antirheumatic drugs and steroids are immunosuppressive which means they increase your risk of infections and have multiple potential adverse effects on various organ systems in your body, though most of them are uncommon.  It is important that you are up-to-date on age-appropriate immunizations, particularly shingles and bacterial/viral pneumonia vaccines, which you can request from me or your primary care provider.  Be sure to read the drug package inserts to learn about the potential side effects of your medications before you start taking them.  If you experience any significant drug side effects, stop taking the medication and notify me promptly, and depending on the severity of the side effects, consider going to an urgent care or emergency department for immediate attention.  If there are significant findings on your lab tests and imaging studies that warrant further action, I will notify you with explanations via [employer] or phone call, otherwise you can view them on [facility] and let me know if you have any questions.  Note that [facility] messages are typically read during office hours and may take 1-7 business days before a response depending on the urgency of the situation and how busy my clinic schedule is.  In general, [facility] messaging is for non-urgent matters that do not require immediate attention, so for urgent matters that cannot wait, you are advised to go to an urgent care.  Lastly, you are granted  Alexandrat access to my documentation of your visit and are encouraged to read my note which details my assessment and plan for your condition.  To learn more about your condition and rheumatic diseases evaluated and treated by rheumatologists, as well as gain access to many helpful resources about these diseases, visit our website at [email or web address].

## 2024-01-10 NOTE — PROGRESS NOTES
[facility] RHEUMATOLOGY  [address], [address], [place] [address]  Phone: [phone] ? Fax: [phone]  [facility]/Health-Services/Rheumatology    FOLLOW-UP VISIT NOTE      DATE OF SERVICE: 01/10/2024         Subjective     PRIMARY CARE PRACTITIONER:  Jenniffer Bermudez M.D.  [address]  [place] [address]    PATIENT IDENTIFICATION:  Isabell Lopez  [address] [place] [address]    YOB: 1975    MEDICAL RECORD NUMBER: [id number]          CHIEF COMPLAINT:   Chief Complaint   Patient presents with    Follow-Up     Seropositive erosive rheumatoid arthritis       RHEUMATOLOGIC HISTORY:  Isabell Lopez is a 48 y.o. female with pertinent history notable for seropositive erosive rheumatoid arthritis diagnosed in 2010 (reportedly symptomatic since 2007), secondary osteoarthritis s/p foot/ankle s/p arthroscopies at [facility], and Hashimoto's thyroiditis with hypothyroidism among other comorbidities. Previously under the care of a local rheumatologist who has retired (Dr. Tolu Medina), she initially presented on 4/12/22 to establish care for continued evaluation and management of her condition. Reported joint pain in her hands (mostly MCPs and PIPs), wrists, shoulders, ankles, and feet associated with over 1 hour of morning stiffness that improved with activity.     Pertinent treatments: Medrol 4 mg tapered off (from 2014/2015-4/2022, effective), prednisone 4>20 mg tapered off (4/2022-6/2022, effective), methotrexate (stopped due to intolerable GI side effects), leflunomide (stopped in 2/2022 due to ineffectiveness), hydroxychloroquine (previously stopped for unclear reasons, resumed 4/2022-present, effective), Enbrel 50 mg SC weekly (7/2022-present, effective).    Pertinent positive labs: Positive RF >360, anti-, PAPO 1:320 homogenous pattern, and anti-.3 (in 11/2017); elevated CRP 1.31 with normal ESR 18 (in 3/2021).    Pertinent negative labs: Negative HCV (in  2013), HBV and QTB (in 2022), eGFR, creatinine, LFTs, and acceptable CBC (in 2023).    CT scan left foot (in 2023): Marked periscrew lucencies and erosive bony changes involving the distal dorsal plate and screws fusing the first MTP joint; erosive changes on the undersurface of the fused joint; resection of the metatarsal heads 2-5 with questionable erosive changes of the metatarsal stumps and proximal phalangeal bases 2-5; findings are highly concerning for hardware infection and osteomyelitis, so consider contrast-enhanced MRI to evaluate for osteomyelitis.      INTERVAL HISTORY:  Reports interval history as noted on the questionnaire below or scanned under media tab.  Notably slightly increasing symptoms since running out of Enbrel, so needs refills.  Doing quite well otherwise with no significant symptoms presently.    REVIEW OF SYSTEMS:  Except as noted in the history above, relevant review of systems with emphasis on autoimmune rheumatic diseases was otherwise negative.      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) 2022    Long term current use of systemic steroids 2022    Hammertoe of left foot 2022    Foot pain, left 2022    S/P  section 01/10/2019    Acute post-operative pain 01/10/2019    Acute blood loss as cause of postoperative anemia 01/10/2019    Other thyrotoxicosis with thyrotoxic crisis or storm 2016    Seropositive erosive rheumatoid arthritis (HCC) 2014    H/O breast augmentation 2014    Rheumatoid arthritis of hand (HCC) 2013       PAST MEDICAL HISTORY:  Past Medical History:   Diagnosis Date    Arthritis     Arthritis, rheumatoid (HCC)     Disorder of thyroid     hyperthyroid    RA  (rheumatoid arthritis) ([facility]) 11/05/2013       PAST SURGICAL HISTORY:  Past Surgical History:   Procedure Laterality Date    IRRIGATION & DEBRIDEMENT GENERAL Left 8/7/2023    Procedure: FOOT IRRIGATION AND DEBRIDEMENT;  Surgeon: Roby Espinosa M.D.;  Location: [facility];  Service: Orthopedics    HARDWARE REMOVAL ORTHO Left 8/7/2023    Procedure: REMOVAL, HARDWARE;  Surgeon: Roby Espinosa M.D.;  Location: [facility];  Service: Orthopedics    PB FUSION BIG TOE,MT-P JT Left 01/18/2023    Procedure: LEFT REVISION FIRST METATARSALPHALANGEAL JOINT FUSION;  Surgeon: Roby Espinosa M.D.;  Location: [facility];  Service: Orthopedics    PB REMV BONE FOR GRAFT MINOR Left 01/18/2023    Procedure: LEFT CALCANEAL BONE GRAFT;  Surgeon: Roby Espinosa M.D.;  Location: [facility];  Service: Orthopedics    PB REMOVAL DEEP IMPLANT Left 01/18/2023    Procedure: LEFT FIRST METATARSALPHALANGEAL JOINT HARDWARE REMOVAL;  Surgeon: Roby Espinosa M.D.;  Location: [facility];  Service: Orthopedics    PB FUSION BIG TOE,MT-P JT Left 03/02/2022    Procedure: LEFT FOOT FIRST METATARSOPHALANGEAL JOINT FUSION, LEFT RHEUMATOID FOREFOOT RECONSTRUCTION, REPAIRS AS INDICATED.;  Surgeon: Roby Espinosa M.D.;  Location: [facility];  Service: Orthopedics    TOE FUSION Right 12/16/2019    Procedure: FUSION, JOINT, TOE - FIRST METATARSOPHALANGEAL JOINT;  Surgeon: Roby Espinosa M.D.;  Location: [facility];  Service: Orthopedics    FOOT RECONSTRUCTION RHEUMATIC Right 12/16/2019    Procedure: RECONSTRUCTION, FOOT, FOR RHEUMATOID ARTHRITIS - FOREFOOT;  Surgeon: Roby Espinosa M.D.;  Location: [facility];  Service: Orthopedics    ANKLE ARTHROSCOPY Right 12/16/2019    Procedure: ARTHROSCOPY, ANKLE;  Surgeon: Roby Espinosa M.D.;  Location: [facility];  Service: Orthopedics    PRIMARY C SECTION  01/07/2019     "Procedure: PRIMARY C SECTION;  Surgeon: Carlee Long M.D.;  Location: [facility];  Service: Labor and Delivery    MAMMOPLASTY AUGMENTATION  07/12/2011    Performed by MARIBELL GONZALES at [facility]       MEDICATIONS:  Current Outpatient Medications   Medication Sig    Etanercept (ENBREL SURECLICK) 50 MG/ML Solution Auto-injector Inject 50 mg under the skin every 7 days.       ALLERGIES:   No Known Allergies    IMMUNIZATIONS:  Immunization History   Administered Date(s) Administered    Influenza (IM) Preservative Free - HISTORICAL DATA 11/15/2018    Influenza Vaccine Quad Inj (Pf) 11/30/2017, 11/30/2017    [employer] SARS-CoV-2 Vaccine 07/12/2021    Tdap Vaccine 02/22/2016, 11/15/2018       SOCIAL HISTORY:   Social History     Socioeconomic History    Marital status:    Tobacco Use    Smoking status: Never     Passive exposure: Never    Smokeless tobacco: Never   Vaping Use    Vaping Use: Never used   Substance and Sexual Activity    Alcohol use: Yes     Alcohol/week: 0.6 oz     Types: 1 Glasses of wine per week     Comment: 1/week    Drug use: Never    Sexual activity: Yes     Partners: Male       FAMILY HISTORY:  Family History   Problem Relation Age of Onset    Cancer Father         throat/prostate    Rheumatologic Disease Paternal Aunt     Other Paternal Aunt         MS    Rheumatologic Disease Paternal Aunt     Arthritis Paternal Aunt         RA            Objective     Vital Signs: /78 (BP Location: Left arm, Patient Position: Sitting, BP Cuff Size: Adult)   Pulse 83   Temp 36.3 °C (97.3 °F) (Tympanic)   Ht 1.753 m (5' 9\")   Wt 72.3 kg (159 lb 6.4 oz)   SpO2 99% Body mass index is 23.54 kg/m².    General: Appears well and comfortable  Eyes: No scleral or conjunctival lesions  ENT: No apparent oral or nasal lesions  Head/Neck: No apparent scalp or neck lesions  Cardiovascular: Regular rate and rhythm  Respiratory: Breathing quiet and unlabored  Gastrointestinal: No " apparent organomegaly or abdominal masses  Integumentary: No significant cutaneous lesions or dyspigmentation  Musculoskeletal: Reducible ulnar deviation at MCP joints (left much worse than right); marked synovial hypertrophy across MCP joints and wrists with relatively restricted range of motion; no significant joint tenderness, warmth, or overt synovitis  Neurologic: No focal sensory or motor deficits  Psychiatric: Mood and affect appropriate      LABORATORY RESULTS REVIEWED AND INTERPRETED BY ME:  Lab Results   Component Value Date/Time    CREACTPROT 1.31 (H) 03/09/2021 09:56 AM    SEDRATEWES 18 03/09/2021 09:56 AM    URICACID 3.5 11/30/2011 01:40 PM     Lab Results   Component Value Date/Time    RHEUMFACTN >360 (H) 11/30/2017 02:32 PM    CCPANTIBODY 188 (H) 11/30/2017 02:32 PM     Lab Results   Component Value Date/Time    ANTINUCAB Detected (A) 11/30/2017 02:32 PM     Lab Results   Component Value Date/Time    MICROSOMALA 244.3 (H) 11/30/2017 02:32 PM    TSHULTRASEN <0.005 (L) 11/30/2018 11:00 AM    FREET4 1.92 (H) 11/30/2018 11:00 AM     Lab Results   Component Value Date/Time    25HYDROXY 13 (L) 12/30/2013 02:18 PM     Lab Results   Component Value Date/Time    WBC 7.7 08/17/2023 12:00 PM    RBC 5.57 (H) 08/17/2023 12:00 PM    HEMOGLOBIN 14.7 08/17/2023 12:00 PM    HEMATOCRIT 46.9 08/17/2023 12:00 PM    MCV 84.2 08/17/2023 12:00 PM    MCH 26.4 (L) 08/17/2023 12:00 PM    MCHC 31.3 (L) 08/17/2023 12:00 PM    RDW 45.2 08/17/2023 12:00 PM    PLATELETCT 419 08/17/2023 12:00 PM    MPV 10.6 08/17/2023 12:00 PM    NEUTS 4.20 08/17/2023 12:00 PM    POLYS 100 02/21/2014 10:00 AM    LYMPHOCYTES 27.10 08/17/2023 12:00 PM    MONOCYTES 6.60 08/17/2023 12:00 PM    EOSINOPHILS 10.10 (H) 08/17/2023 12:00 PM    BASOPHILS 1.40 08/17/2023 12:00 PM    HYPOCHROMIA 1+ 12/30/2013 02:18 PM    ANISOCYTOSIS 1+ 11/05/2013 01:08 PM     Lab Results   Component Value Date/Time    ASTSGOT 18 08/17/2023 12:00 PM    ALTSGPT 14 08/17/2023  12:00 PM    ALKPHOSPHAT 68 08/17/2023 12:00 PM    TBILIRUBIN 0.6 08/17/2023 12:00 PM    TOTPROTEIN 7.7 08/17/2023 12:00 PM    ALBUMIN 4.5 08/17/2023 12:00 PM     Lab Results   Component Value Date/Time    SODIUM 136 08/17/2023 12:00 PM    POTASSIUM 4.7 08/17/2023 12:00 PM    CHLORIDE 102 08/17/2023 12:00 PM    CO2 25 08/17/2023 12:00 PM    GLUCOSE 134 (H) 08/17/2023 12:00 PM    BUN 14 08/17/2023 12:00 PM    CREATININE 0.74 08/17/2023 12:00 PM    CALCIUM 9.7 08/17/2023 12:00 PM     Lab Results   Component Value Date/Time    COLORURINE Yellow 11/30/2018 03:30 PM    SPECGRAVITY 1.008 11/30/2018 03:30 PM    PHURINE 5.5 11/30/2018 03:30 PM    GLUCOSEUR Negative 11/30/2018 03:30 PM    KETONES 40 (A) 11/30/2018 03:30 PM    PROTEINURIN Negative 11/30/2018 03:30 PM     Lab Results   Component Value Date/Time    FLTYPE Synovial 02/21/2014 10:00 AM     Lab Results   Component Value Date/Time    HEPBSAG Non-Reactive 06/13/2022 10:21 AM    HEPBCORTOT Negative 12/30/2013 02:18 PM    HEPCAB Negative 12/30/2013 02:18 PM       RADIOLOGY RESULTS REVIEWED AND INTERPRETED BY ME:  Results for orders placed during the hospital encounter of 12/30/13    DX-KNEES-AP BILATERAL STANDING    Impression  Unremarkable examination.    Results for orders placed in visit on 07/11/23    DX-FOOT-COMPLETE 3+ LEFT    Results for orders placed during the hospital encounter of 12/30/13    DX-ANKLE 2- VIEWS    Impression  Apparent ankle joint effusion without evidence of bony abnormality.    Results for orders placed during the hospital encounter of 12/30/13    DX-JOINT SURVEY-FEET SINGLE VIEW    Impression  Bilateral bony erosions consistent with history of rheumatoid arthritis.    Results for orders placed during the hospital encounter of 12/30/13    DX-JOINT SURVEY-HANDS SINGLE VIEW    Impression  Apparent joint space narrowing and bony erosion involving the right fifth metacarpal head.    Results for orders placed in visit on 11/30/15    MR-LUMBAR  SPINE-W/O    Impression  Mild degenerative disease in the lumbar spine as described above.    Results for orders placed in visit on 11/30/15    MR-CERVICAL SPINE-W/O    Impression  1.  There is mild disc bulge at C3-4 without significant spinal or neural foraminal stenosis.  2.  Mild generalized thyroid enlargement.      All relevant laboratory and imaging results reported on this note were reviewed and interpreted by me.         Assessment & Plan     Isabell Lopez is a 48 y.o. female with history as noted above whose presentation merits the following clinical impressions and recommendations:    1. Seropositive erosive rheumatoid arthritis (HCC)  Clinically low disease activity that appears to be well-controlled on the current regimen of Enbrel and hydroxychloroquine, so no need for modification of treatment. However, given the potential for discordance between immunologic activity and clinical disease manifestations, need to occasionally reassess markers of disease activity to gauge overall trajectory in response to treatment.  - CRP and ESR (previously ordered)  - Etanercept (ENBREL SURECLICK) 50 MG/ML Solution Auto-injector; Inject 50 mg under the skin every 7 days.  Dispense: 4 Each; Refill: 5  - Continue hydroxychloroquine 200 mg daily    2. 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  - No clinical indication for additional testing at this time    3. Immunosuppressed status (HCC)  Presently with no history or physical evidence to suggest significant adverse drug effects or opportunistic infections, but need routine monitoring per guidelines.  - HBV, HCV, QTB, CBC and CMP (previously  ordered)  - Need to ascertain age-appropriate vaccines in addition to the ones listed above under immunizations    4. Long-term use of hydroxychloroquine  Minimal to no risk of retinal toxicity given the low dose of hydroxychloroquine prescribed (less than 5 mg/kg of body weight) per rheumatology and ophthalmology guidelines. However, ophthalmologic evaluation is still recommended with the frequency of routine follow-up eye exams determined by the ophthalmologist, typically annually or every other year.  - CBC and CMP (previously ordered)  - Routine ophthalmology evaluation as recommended      The above assessment and plan were discussed with the patient who acknowledged understanding of the plan.    FOLLOW-UP: Return in about 6 months (around 7/10/2024) for Short.         Thank you for the opportunity to participate in the care of Isabell Lopez.    Nick Multani MD, MS, FACR  Rheumatologist, [facility] Rheumatology ? [facility]   of Clinical Medicine, Department of Internal Medicine  Critical access hospital ? [facility]

## 2024-01-12 LAB
GAMMA INTERFERON BACKGROUND BLD IA-ACNC: 0.09 IU/ML
M TB IFN-G BLD-IMP: NEGATIVE
M TB IFN-G CD4+ BCKGRND COR BLD-ACNC: 0 IU/ML
MITOGEN IGNF BCKGRD COR BLD-ACNC: 9.25 IU/ML
QFT TB2 - NIL TBQ2: -0.01 IU/ML

## 2024-06-17 DIAGNOSIS — M05.80 SEROPOSITIVE EROSIVE RHEUMATOID ARTHRITIS (HCC): ICD-10-CM

## 2024-06-19 ENCOUNTER — TELEPHONE (OUTPATIENT)
Dept: PHARMACY | Facility: MEDICAL CENTER | Age: 49
End: 2024-06-19
Payer: COMMERCIAL

## 2024-06-19 NOTE — TELEPHONE ENCOUNTER
Prior Authorization for  ENBREL 50 MG/ML SURECLICK has been approved for a quantity of 4ml , day supply 28    Prior Authorization reference number: [id number]  Insurance: Nga  Effective dates: 06/19/2024 to 06/19/2025  Copay: $N/A     Renewal

## 2024-06-19 NOTE — TELEPHONE ENCOUNTER
Prior Authorization for Enbrel SureClick 50MG/ML auto-injectors   (Quantity: 4ml, Days: 28) has been submitted via Cover My Meds: Key ([id number])    Insurance: Nga    Will follow up in 24-48 business hours.

## 2024-06-20 RX ORDER — MEDROXYPROGESTERONE ACETATE 150 MG/ML
1 INJECTION, SUSPENSION INTRAMUSCULAR
Qty: 4 EACH | Refills: 11 | Status: SHIPPED | OUTPATIENT
Start: 2024-06-20

## 2024-07-10 ENCOUNTER — OFFICE VISIT (OUTPATIENT)
Dept: RHEUMATOLOGY | Facility: MEDICAL CENTER | Age: 49
End: 2024-07-10
Attending: STUDENT IN AN ORGANIZED HEALTH CARE EDUCATION/TRAINING PROGRAM
Payer: COMMERCIAL

## 2024-07-10 VITALS
SYSTOLIC BLOOD PRESSURE: 100 MMHG | OXYGEN SATURATION: 98 % | RESPIRATION RATE: 12 BRPM | HEART RATE: 102 BPM | TEMPERATURE: 97 F | WEIGHT: 147 LBS | BODY MASS INDEX: 21.71 KG/M2 | DIASTOLIC BLOOD PRESSURE: 62 MMHG

## 2024-07-10 DIAGNOSIS — M71.331 SYNOVIAL CYST OF RIGHT WRIST: ICD-10-CM

## 2024-07-10 DIAGNOSIS — R76.8 SEROLOGIC AUTOIMMUNITY: ICD-10-CM

## 2024-07-10 DIAGNOSIS — M05.80 SEROPOSITIVE EROSIVE RHEUMATOID ARTHRITIS (HCC): ICD-10-CM

## 2024-07-10 DIAGNOSIS — D84.9 IMMUNOSUPPRESSED STATUS (HCC): ICD-10-CM

## 2024-07-10 PROCEDURE — 99214 OFFICE O/P EST MOD 30 MIN: CPT | Performed by: STUDENT IN AN ORGANIZED HEALTH CARE EDUCATION/TRAINING PROGRAM

## 2024-07-10 PROCEDURE — 99212 OFFICE O/P EST SF 10 MIN: CPT | Performed by: STUDENT IN AN ORGANIZED HEALTH CARE EDUCATION/TRAINING PROGRAM

## 2024-07-10 PROCEDURE — 3078F DIAST BP <80 MM HG: CPT | Performed by: STUDENT IN AN ORGANIZED HEALTH CARE EDUCATION/TRAINING PROGRAM

## 2024-07-10 PROCEDURE — 3074F SYST BP LT 130 MM HG: CPT | Performed by: STUDENT IN AN ORGANIZED HEALTH CARE EDUCATION/TRAINING PROGRAM

## 2024-07-10 RX ORDER — MEDROXYPROGESTERONE ACETATE 150 MG/ML
50 INJECTION, SUSPENSION INTRAMUSCULAR
COMMUNITY
End: 2024-07-10

## 2024-07-10 RX ORDER — MEDROXYPROGESTERONE ACETATE 150 MG/ML
50 INJECTION, SUSPENSION INTRAMUSCULAR
Qty: 4 EACH | Refills: 5 | Status: SHIPPED | OUTPATIENT
Start: 2024-07-10

## 2024-07-10 ASSESSMENT — FIBROSIS 4 INDEX: FIB4 SCORE: 0.7

## 2024-11-26 DIAGNOSIS — M05.80 SEROPOSITIVE EROSIVE RHEUMATOID ARTHRITIS (HCC): ICD-10-CM

## 2024-11-27 RX ORDER — MEDROXYPROGESTERONE ACETATE 150 MG/ML
INJECTION, SUSPENSION INTRAMUSCULAR
Qty: 4 ML | Refills: 5 | Status: SHIPPED | OUTPATIENT
Start: 2024-11-27

## 2025-01-06 ENCOUNTER — APPOINTMENT (OUTPATIENT)
Dept: RHEUMATOLOGY | Facility: MEDICAL CENTER | Age: 50
End: 2025-01-06
Attending: STUDENT IN AN ORGANIZED HEALTH CARE EDUCATION/TRAINING PROGRAM
Payer: COMMERCIAL

## 2025-02-12 ENCOUNTER — HOSPITAL ENCOUNTER (OUTPATIENT)
Dept: LAB | Facility: MEDICAL CENTER | Age: 50
End: 2025-02-12
Attending: STUDENT IN AN ORGANIZED HEALTH CARE EDUCATION/TRAINING PROGRAM
Payer: COMMERCIAL

## 2025-02-12 DIAGNOSIS — D84.9 IMMUNOSUPPRESSED STATUS (HCC): ICD-10-CM

## 2025-02-12 DIAGNOSIS — M05.80 SEROPOSITIVE EROSIVE RHEUMATOID ARTHRITIS (HCC): ICD-10-CM

## 2025-02-12 LAB
BASOPHILS # BLD AUTO: 0.4 % (ref 0–1.8)
BASOPHILS # BLD: 0.03 K/UL (ref 0–0.12)
EOSINOPHIL # BLD AUTO: 0.26 K/UL (ref 0–0.51)
EOSINOPHIL NFR BLD: 3.2 % (ref 0–6.9)
ERYTHROCYTE [DISTWIDTH] IN BLOOD BY AUTOMATED COUNT: 42.5 FL (ref 35.9–50)
ERYTHROCYTE [SEDIMENTATION RATE] IN BLOOD BY WESTERGREN METHOD: 6 MM/HOUR (ref 0–25)
HCT VFR BLD AUTO: 46.5 % (ref 37–47)
HGB BLD-MCNC: 15 G/DL (ref 12–16)
IMM GRANULOCYTES # BLD AUTO: 0.03 K/UL (ref 0–0.11)
IMM GRANULOCYTES NFR BLD AUTO: 0.4 % (ref 0–0.9)
LYMPHOCYTES # BLD AUTO: 1.79 K/UL (ref 1–4.8)
LYMPHOCYTES NFR BLD: 22.2 % (ref 22–41)
MCH RBC QN AUTO: 27.6 PG (ref 27–33)
MCHC RBC AUTO-ENTMCNC: 32.3 G/DL (ref 32.2–35.5)
MCV RBC AUTO: 85.6 FL (ref 81.4–97.8)
MONOCYTES # BLD AUTO: 0.64 K/UL (ref 0–0.85)
MONOCYTES NFR BLD AUTO: 8 % (ref 0–13.4)
NEUTROPHILS # BLD AUTO: 5.3 K/UL (ref 1.82–7.42)
NEUTROPHILS NFR BLD: 65.8 % (ref 44–72)
NRBC # BLD AUTO: 0 K/UL
NRBC BLD-RTO: 0 /100 WBC (ref 0–0.2)
PLATELET # BLD AUTO: 379 K/UL (ref 164–446)
PMV BLD AUTO: 10.5 FL (ref 9–12.9)
RBC # BLD AUTO: 5.43 M/UL (ref 4.2–5.4)
WBC # BLD AUTO: 8.1 K/UL (ref 4.8–10.8)

## 2025-02-12 PROCEDURE — 85025 COMPLETE CBC W/AUTO DIFF WBC: CPT

## 2025-02-12 PROCEDURE — 85652 RBC SED RATE AUTOMATED: CPT

## 2025-02-12 PROCEDURE — 86140 C-REACTIVE PROTEIN: CPT

## 2025-02-12 PROCEDURE — 80053 COMPREHEN METABOLIC PANEL: CPT

## 2025-02-12 PROCEDURE — 36415 COLL VENOUS BLD VENIPUNCTURE: CPT

## 2025-02-13 ENCOUNTER — TELEMEDICINE (OUTPATIENT)
Dept: RHEUMATOLOGY | Facility: MEDICAL CENTER | Age: 50
End: 2025-02-13
Attending: STUDENT IN AN ORGANIZED HEALTH CARE EDUCATION/TRAINING PROGRAM
Payer: COMMERCIAL

## 2025-02-13 VITALS — BODY MASS INDEX: 20.04 KG/M2 | WEIGHT: 140 LBS | HEIGHT: 70 IN

## 2025-02-13 DIAGNOSIS — M05.80 SEROPOSITIVE EROSIVE RHEUMATOID ARTHRITIS (HCC): ICD-10-CM

## 2025-02-13 DIAGNOSIS — M71.331 SYNOVIAL CYST OF RIGHT WRIST: ICD-10-CM

## 2025-02-13 DIAGNOSIS — R76.8 SEROLOGIC AUTOIMMUNITY: ICD-10-CM

## 2025-02-13 DIAGNOSIS — D84.9 IMMUNOSUPPRESSED STATUS (HCC): ICD-10-CM

## 2025-02-13 LAB
ALBUMIN SERPL BCP-MCNC: 4 G/DL (ref 3.2–4.9)
ALBUMIN/GLOB SERPL: 1.1 G/DL
ALP SERPL-CCNC: 51 U/L (ref 30–99)
ALT SERPL-CCNC: 16 U/L (ref 2–50)
ANION GAP SERPL CALC-SCNC: 10 MMOL/L (ref 7–16)
AST SERPL-CCNC: 18 U/L (ref 12–45)
BILIRUB SERPL-MCNC: 0.6 MG/DL (ref 0.1–1.5)
BUN SERPL-MCNC: 8 MG/DL (ref 8–22)
CALCIUM ALBUM COR SERPL-MCNC: 9.4 MG/DL (ref 8.5–10.5)
CALCIUM SERPL-MCNC: 9.4 MG/DL (ref 8.5–10.5)
CHLORIDE SERPL-SCNC: 104 MMOL/L (ref 96–112)
CO2 SERPL-SCNC: 23 MMOL/L (ref 20–33)
CREAT SERPL-MCNC: 0.65 MG/DL (ref 0.5–1.4)
CRP SERPL HS-MCNC: 0.62 MG/DL (ref 0–0.75)
GFR SERPLBLD CREATININE-BSD FMLA CKD-EPI: 108 ML/MIN/1.73 M 2
GLOBULIN SER CALC-MCNC: 3.6 G/DL (ref 1.9–3.5)
GLUCOSE SERPL-MCNC: 76 MG/DL (ref 65–99)
POTASSIUM SERPL-SCNC: 4.6 MMOL/L (ref 3.6–5.5)
PROT SERPL-MCNC: 7.6 G/DL (ref 6–8.2)
SODIUM SERPL-SCNC: 137 MMOL/L (ref 135–145)

## 2025-02-13 PROCEDURE — 99214 OFFICE O/P EST MOD 30 MIN: CPT | Mod: 95 | Performed by: STUDENT IN AN ORGANIZED HEALTH CARE EDUCATION/TRAINING PROGRAM

## 2025-02-13 ASSESSMENT — RHEUMATOLOGY FOLLOW-UP QUESTIONNAIRE
CHARACTERISTIC: BETTER WITH ACTIVITY
MARK ALL THE AREAS OF PAIN: 114630949
JOINT PAIN: SAME WITH ACTIVITY
DURATION: <30 MINS

## 2025-02-13 ASSESSMENT — PATIENT HEALTH QUESTIONNAIRE - PHQ9: CLINICAL INTERPRETATION OF PHQ2 SCORE: 0

## 2025-02-13 ASSESSMENT — FIBROSIS 4 INDEX: FIB4 SCORE: 0.57

## 2025-02-13 NOTE — PATIENT INSTRUCTIONS
[facility] AFTER [facility]ISIT GUIDE    Below are important guidelines to help you navigate your health care needs and assist us in caring for you safely and effectively. We encourage you to carefully read and understand this information and adhere to them accordingly.    [facility] Messaging and Phone Calls:  Diagnosis and Treatment - For a detailed explanation of your condition and treatment plan from today's visit, refer to the visit note on [facility] via the following steps:  Log in to S[employer]ion and click on “Visits” at the top.  Scroll down to “Past Visits” under Appointments.  Click on “View Notes” under the appropriate visit date.  Questions or Concerns - MyChart messaging is for non-urgent matters that do not require immediate attention and should be brief with no more than two questions or concerns. If you have multiple questions or concerns, we ask that you schedule an appointment to have them properly addressed.  Response to Messages - [facility] messages are addressed throughout the week depending on clinical availability, so we ask that you allow up to one week for a response.  Phone Calls and Voicemails - Phone calls and voicemail messages are reserved for time-sensitive matters that cannot wait to be addressed via [facility]. We ask that you refrain from calling the office multiple times or leaving multiple voicemails regarding the same issue as doing so may lead to delays in response time.  Urgent Issues - For urgent medical matters or medical emergencies that cannot wait, you are advised to go to your nearest Urgent Care or Emergency Department for immediate attention.    Laboratory Tests and Imaging Studies:  Future Lab and Imaging Orders - We ask that you get your lab tests and imaging studies done no later than one week before your follow-up visit unless instructed otherwise.  Results Communication - You may see some test results marked as “abnormal” that are not necessarily significant or concerning. If  there are significant abnormalities on your test results that warrant further action, you will be notified via MyChart or phone call, otherwise they will be addressed at your follow-up visit.    Prescriptions and Refill Requests:  General Prescriptions (e.g. prednisone, hydroxychloroquine, leflunomide, methotrexate, etc.) - These are sent to Retail Pharmacies, so all refill requests of these medications should be directed to your local pharmacy.  Specialty Prescriptions (e.g. Enbrel, Humira, Cosentyx, Xeljanz, etc.) - These are sent to Specialty Pharmacies, so all refill requests of these medications should be directed to your designated specialty pharmacy.  Infusion Prescriptions (e.g. Remicade, Simponi Aria, Rituxan, Saphnelo, etc.) - These are sent to Outpatient Infusion Centers, so all scheduling requests of these medications should be directed to your local infusion center.    Medication Risks and Adverse Effects:  Immunosuppressed Status - Steroids and antirheumatic drugs are immunosuppressants, so they increase the risk of infections and can have side effects on various organ systems in your body, though most of them are uncommon.  Potential Side Effects - Be sure to read the drug package inserts to learn about the potential side effects of your medications before you start taking them and take them exactly as prescribed unless instructed otherwise.  In Case of Side Effects - If you experience any significant side effects, stop taking the medication immediately and promptly notify the prescriber. Depending on the severity of the side effects, consider going to an Urgent Care or Emergency Department for immediate attention.    Immunizations and Health Screening:  Vaccinations - If you are on immunosuppressive therapy, it is important that you are up to date on age-appropriate immunizations, particularly shingles and pneumonia vaccines, which you can request from your primary care provider or from us at your  next appointment.  Screening Tests - It is also important that you are up to date on age-appropriate screening tests, such as pap smear, mammography, and colonoscopy, which you can request from your primary care provider.    Educational and Supportive Resources:  [facility] ([email or web address]) - Visit our website to learn more about your condition and other rheumatic diseases, and gain access to many helpful resources for them.  Disposal of Old Medications (www.keri.gov/everyday-takeback-day) - Visit the Drug Enforcement Administration website to find a nearby location where you can properly dispose of old medications you no longer need.  Disposal of Used [place] (www.safeneedledisposal.org) - Visit the Safe Needle Disposal Organization website to find a nearby location where you can properly dispose of used needles from your injectable medications.

## 2025-02-13 NOTE — PROGRESS NOTES
[facility] RHEUMATOLOGY  [address], [address], [place] [address]  Phone: [phone] ? Fax: [phone]  [facility]/Health-Services/Rheumatology    VIRTUAL VIDEO FOLLOW-UP VISIT NOTE    This evaluation was conducted via Microsoft Office Teams using secure and encrypted videoconferencing technology for virtual visit. The patient was in their home in the [facility]. The patient's identity was confirmed and verbal consent was obtained for this encounter.         Subjective     DATE OF SERVICE: 02/13/2025    PRIMARY CARE PROVIDER:  Jenniffer Bermudez M.D.  [address] [place] [address] [id number]  [place] [address]    PATIENT IDENTIFICATION:  Isabell Lopez  [address]  [place] [address]    YOB: 1975    MEDICAL RECORD NUMBER: [id number]         CHIEF COMPLAINT:   Chief Complaint   Patient presents with    Follow-Up     Seropositive erosive rheumatoid arthritis (HCC)       RHEUMATOLOGIC HISTORY:  Isabell Lopez is a 50 y.o. female with pertinent history notable for seropositive erosive RA diagnosed in 2010 (reportedly symptomatic since 2007), secondary osteoarthritis s/p foot/ankle s/p arthroscopies at [facility], and Hashimoto's thyroiditis with hypothyroidism among other comorbidities. Previously under the care of a local rheumatologist who has retired (Dr. Toul Medina), she initially presented on 4/12/22 to establish care for continued evaluation and management of her condition. Reported joint pain in her hands (mostly MCPs and PIPs), wrists, shoulders, ankles, and feet associated with over 1 hour of morning stiffness that improved with activity.      Pertinent treatments: Medrol 4 mg tapered off (from 2014/2015-4/2022, effective), prednisone 4>20 mg tapered off (4/2022-6/2022, effective), methotrexate (stopped due to intolerable GI side effects), leflunomide (stopped in 2/2022 due to ineffectiveness), hydroxychloroquine (previously stopped for unclear reasons, resumed 4/2022-3/2024, effective but  self-discontinued as she ran out of refills and was doing), Enbrel 50 mg SC weekly (7/2022-present, effective).     Pertinent positive labs: Positive RF >360, anti-, PAPO 1:320 homogenous pattern, and anti-.3 (in 11/2017).     Pertinent negative labs: Negative HBV, HCV, and QTB (in 1/2024), CRP, ESR, CBC, eGFR, creatinine, and LFTs (in 2/2025).     CT scan left foot (in 7/2023): Marked periscrew lucencies and erosive bony changes involving the distal dorsal plate and screws fusing the first MTP joint; erosive changes on the undersurface of the fused joint; resection of the metatarsal heads 2-5 with questionable erosive changes of the metatarsal stumps and proximal phalangeal bases 2-5; findings are highly concerning for hardware infection and osteomyelitis, so consider contrast-enhanced MRI to evaluate for osteomyelitis.      INTERVAL HISTORY:  Reports interval history as noted on the questionnaire below or scanned under media tab.  [facility] Rheumatology Established Patient History Form    2/13/2025  8:48 AM PST - Filed by Patient   MAIN REASON FOR VISIT Follow up appointmwnt   INTERVAL HISTORY OF ILLNESS   Date of worsening onset:    Preceding incident/ailment:    Describe/list your symptoms: Doing well   Exacerbating factors:    Alleviating factors:    Helpful medications: Enbrel   Ineffective medications:    Severity of pain (scale of 1-10):    Personal/emotional stressors:    Wm All The Areas Of Pain    REVIEW OF SYMPTOMS    General   Fevers    Chills    Night sweats    Malaise    Fatigue    Unintentional weight loss    Musculoskeletal   Joint pain Same with activity   Morning stiffness duration <30 mins   Morning stiffness characteristic Better with activity   Joint swelling    Joint instability    Tendon pain    Muscle pain    Body aches    Dermatologic   Hair loss with bald spots    Hair shedding    Skin thickening    Skin plaques    Sunlight-induced skin rash    Cold-induced color changes (white,  purple, red on rewarming)    Neurologic/Psychiatric   Weakness    Spasms    Tingling    Burning    Numbness    Insomnia    Anxiety    Depression    Head/Eyes   Headaches    Temple pain    Dizziness    Dry eyes    Eye pain    Eye redness    Blurry vision    Vision loss    Ears/Nose   Ear pain    Ringing in ears    Vertigo    Hearing loss    Nasal ulcers    Nosebleeds    Sinus pain    Nasal congestion    Snoring    Mouth/Throat   Oral ulcers    Bleeding gums    Dry mouth    Cavities    Sore throat    Sticking in throat    Difficulty speaking    Neck/Lymphatics   Thyroid pain    Thyroid swelling    Lymph node swelling    Cardiac/Respiratory   Chest pain with breathing    Dry cough    Cough with bloody phlegm    Shortness of breath    Fast heartbeats    Irregular heartbeats    Gastrointestinal   Nausea    Vomiting    Difficulty swallowing    Heartburn    Abdominal pain    Bloody stool    Mucus stool    Genitourinary   Pelvic pain    Genital ulcers    Abnormal discharge    Burning urination    Frothy urine    Blood in urine        REVIEW OF SYSTEMS:  Except as noted in the history above, relevant review of systems with emphasis on autoimmune rheumatic diseases was otherwise negative.      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) 2022    Long term current use of systemic steroids 2022    Hammertoe of left foot 2022    Foot pain, left 2022    S/P  section 01/10/2019    Acute post-operative pain 01/10/2019    Acute blood loss as cause of postoperative anemia 01/10/2019    Other thyrotoxicosis with thyrotoxic crisis or storm 2016    Seropositive erosive rheumatoid arthritis (HCC) 2014    H/O  breast augmentation 09/16/2014    Rheumatoid arthritis of hand ([facility]) 11/05/2013       PAST MEDICAL HISTORY:  Past Medical History:   Diagnosis Date    Arthritis     Arthritis, rheumatoid (Formerly [facility])     Disorder of thyroid     hyperthyroid    RA (rheumatoid arthritis) (Formerly [facility]) 11/05/2013       PAST SURGICAL HISTORY:  Past Surgical History:   Procedure Laterality Date    IRRIGATION & DEBRIDEMENT GENERAL Left 8/7/2023    Procedure: FOOT IRRIGATION AND DEBRIDEMENT;  Surgeon: Roby Espinosa M.D.;  Location: [facility];  Service: Orthopedics    HARDWARE REMOVAL ORTHO Left 8/7/2023    Procedure: REMOVAL, HARDWARE;  Surgeon: Roby Espinosa M.D.;  Location: [facility];  Service: Orthopedics    PB FUSION BIG TOE,MT-P JT Left 01/18/2023    Procedure: LEFT REVISION FIRST METATARSALPHALANGEAL JOINT FUSION;  Surgeon: Roby Espinosa M.D.;  Location: [facility];  Service: Orthopedics    PB REMV BONE FOR GRAFT MINOR Left 01/18/2023    Procedure: LEFT CALCANEAL BONE GRAFT;  Surgeon: Roby Espinosa M.D.;  Location: [facility];  Service: Orthopedics    PB REMOVAL DEEP IMPLANT Left 01/18/2023    Procedure: LEFT FIRST METATARSALPHALANGEAL JOINT HARDWARE REMOVAL;  Surgeon: Roby Espinosa M.D.;  Location: [facility];  Service: Orthopedics    PB FUSION BIG TOE,MT-P JT Left 03/02/2022    Procedure: LEFT FOOT FIRST METATARSOPHALANGEAL JOINT FUSION, LEFT RHEUMATOID FOREFOOT RECONSTRUCTION, REPAIRS AS INDICATED.;  Surgeon: Roby Espinosa M.D.;  Location: [facility];  Service: Orthopedics    TOE FUSION Right 12/16/2019    Procedure: FUSION, JOINT, TOE - FIRST METATARSOPHALANGEAL JOINT;  Surgeon: Roby Espinosa M.D.;  Location: [facility];  Service: Orthopedics    FOOT RECONSTRUCTION RHEUMATIC Right 12/16/2019    Procedure: RECONSTRUCTION, FOOT, FOR RHEUMATOID ARTHRITIS - FOREFOOT;  Surgeon: Roby Espinosa M.D.;  Location:  [facility];  Service: Orthopedics    ANKLE ARTHROSCOPY Right 12/16/2019    Procedure: ARTHROSCOPY, ANKLE;  Surgeon: Roby Espinosa M.D.;  Location: [facility];  Service: Orthopedics    PRIMARY C SECTION  01/07/2019    Procedure: PRIMARY C SECTION;  Surgeon: Carlee Long M.D.;  Location: [facility];  Service: Labor and Delivery    MAMMOPLASTY AUGMENTATION  07/12/2011    Performed by MARIBELL GONZALES at [facility]       SOCIAL HISTORY:  Social History     Socioeconomic History    Marital status:      Spouse name: Not on file    Number of children: Not on file    Years of education: Not on file    Highest education level: Not on file   Occupational History    Not on file   Tobacco Use    Smoking status: Never     Passive exposure: Never    Smokeless tobacco: Never   Vaping Use    Vaping status: Never Used   Substance and Sexual Activity    Alcohol use: Yes     Alcohol/week: 0.6 oz     Types: 1 Glasses of wine per week     Comment: 1/week    Drug use: Never    Sexual activity: Yes     Partners: Male   Other Topics Concern    Not on file   Social History Narrative    Not on file     Social Drivers of Health     Financial Resource Strain: Not on file   Food Insecurity: Not on file   Transportation Needs: Not on file   Physical Activity: Not on file   Stress: Not on file   Social Connections: Not on file   Intimate Partner Violence: Not on file   Housing Stability: Not on file       FAMILY HISTORY:  Family History   Problem Relation Age of Onset    Cancer Father         throat/prostate    Rheumatologic Disease Paternal Aunt     Other Paternal Aunt         MS    Rheumatologic Disease Paternal Aunt     Arthritis Paternal Aunt         RA       MEDICATIONS:  Current Outpatient Medications   Medication Sig    ENBREL SURECLICK 50 MG/ML Solution Auto-injector GIVE 1 INJECTION (50MG) SUBCUTANEOUSLY ONCE WEEKLY.       ALLERGIES:   No Known  "Allergies    IMMUNIZATIONS:  Immunization History   Administered Date(s) Administered    Influenza Vaccine Quad Inj (Pf) 11/30/2017, 11/30/2017    Influenza split virus trivalent (PF) 11/15/2018    Robert SARS-CoV-2 Vaccine 07/12/2021    Tdap Vaccine 02/22/2016, 11/15/2018            Objective     Vital Signs: Ht 1.778 m (5' 10\")   Wt 63.5 kg (140 lb) Body mass index is 20.09 kg/m².    General: Appears well and comfortable  Eyes: Not applicable  ENT: Not applicable  Head/Neck: Not applicable  Cardiovascular: Not applicable  Respiratory: Breathing quiet and unlabored  Gastrointestinal: Not applicable  Integumentary: Not applicable  Musculoskeletal: Not applicable  Neurologic: Not applicable  Psychiatric: Mood and affect appropriate      LABORATORY RESULTS REVIEWED AND INTERPRETED:  Lab Results   Component Value Date/Time    CREACTPROT 0.62 02/12/2025 10:32 AM    SEDRATEWES 6 02/12/2025 10:32 AM    URICACID 3.5 11/30/2011 01:40 PM     Lab Results   Component Value Date/Time    RHEUMFACTN >360 (H) 11/30/2017 02:32 PM    CCPANTIBODY 188 (H) 11/30/2017 02:32 PM     Lab Results   Component Value Date/Time    ANTINUCAB Detected (A) 11/30/2017 02:32 PM     Lab Results   Component Value Date/Time    MICROSOMALA 244.3 (H) 11/30/2017 02:32 PM    TSHULTRASEN <0.005 (L) 11/30/2018 11:00 AM    FREET4 1.92 (H) 11/30/2018 11:00 AM     Lab Results   Component Value Date/Time    25HYDROXY 13 (L) 12/30/2013 02:18 PM     Lab Results   Component Value Date/Time    WBC 8.1 02/12/2025 10:32 AM    RBC 5.43 (H) 02/12/2025 10:32 AM    HEMOGLOBIN 15.0 02/12/2025 10:32 AM    HEMATOCRIT 46.5 02/12/2025 10:32 AM    MCV 85.6 02/12/2025 10:32 AM    MCH 27.6 02/12/2025 10:32 AM    MCHC 32.3 02/12/2025 10:32 AM    RDW 42.5 02/12/2025 10:32 AM    PLATELETCT 379 02/12/2025 10:32 AM    MPV 10.5 02/12/2025 10:32 AM    NEUTS 5.30 02/12/2025 10:32 AM    POLYS 100 02/21/2014 10:00 AM    LYMPHOCYTES 22.20 02/12/2025 10:32 AM    MONOCYTES 8.00 02/12/2025 " 10:32 AM    EOSINOPHILS 3.20 02/12/2025 10:32 AM    BASOPHILS 0.40 02/12/2025 10:32 AM    HYPOCHROMIA 1+ 12/30/2013 02:18 PM    ANISOCYTOSIS 1+ 11/05/2013 01:08 PM     Lab Results   Component Value Date/Time    ASTSGOT 18 02/12/2025 10:32 AM    ALTSGPT 16 02/12/2025 10:32 AM    ALKPHOSPHAT 51 02/12/2025 10:32 AM    TBILIRUBIN 0.6 02/12/2025 10:32 AM    TOTPROTEIN 7.6 02/12/2025 10:32 AM    ALBUMIN 4.0 02/12/2025 10:32 AM     Lab Results   Component Value Date/Time    SODIUM 137 02/12/2025 10:32 AM    POTASSIUM 4.6 02/12/2025 10:32 AM    CHLORIDE 104 02/12/2025 10:32 AM    CO2 23 02/12/2025 10:32 AM    GLUCOSE 76 02/12/2025 10:32 AM    BUN 8 02/12/2025 10:32 AM    CREATININE 0.65 02/12/2025 10:32 AM    CALCIUM 9.4 02/12/2025 10:32 AM     Lab Results   Component Value Date/Time    COLORURINE Yellow 11/30/2018 03:30 PM    SPECGRAVITY 1.008 11/30/2018 03:30 PM    PHURINE 5.5 11/30/2018 03:30 PM    GLUCOSEUR Negative 11/30/2018 03:30 PM    KETONES 40 (A) 11/30/2018 03:30 PM    PROTEINURIN Negative 11/30/2018 03:30 PM     Lab Results   Component Value Date/Time    FLTYPE Synovial 02/21/2014 10:00 AM     Lab Results   Component Value Date/Time    HEPBSAG Non-Reactive 01/10/2024 10:46 AM    HEPBCORTOT Negative 12/30/2013 02:18 PM    HEPCAB Non-Reactive 01/10/2024 10:46 AM       RADIOLOGY RESULTS REVIEWED AND INTERPRETED:  Results for orders placed during the hospital encounter of 12/30/13    DX-KNEES-AP BILATERAL STANDING    Impression  Unremarkable examination.    Results for orders placed in visit on 07/11/23    DX-FOOT-COMPLETE 3+ LEFT    Results for orders placed during the hospital encounter of 12/30/13    DX-ANKLE 2- VIEWS    Impression  Apparent ankle joint effusion without evidence of bony abnormality.    Results for orders placed during the hospital encounter of 12/30/13    DX-JOINT SURVEY-FEET SINGLE VIEW    Impression  Bilateral bony erosions consistent with history of rheumatoid arthritis.    Results for orders  placed during the hospital encounter of 12/30/13    DX-JOINT SURVEY-HANDS SINGLE VIEW    Impression  Apparent joint space narrowing and bony erosion involving the right fifth metacarpal head.    Results for orders placed in visit on 11/30/15    MR-LUMBAR SPINE-W/O    Impression  Mild degenerative disease in the lumbar spine as described above.    Results for orders placed in visit on 11/30/15    MR-CERVICAL SPINE-W/O    Impression  1.  There is mild disc bulge at C3-4 without significant spinal or neural foraminal stenosis.  2.  Mild generalized thyroid enlargement.      All relevant laboratory and imaging results reported on this note were reviewed and interpreted by me.         Assessment & Plan     Isabell Lopez is a 50 y.o. female with history as noted above whose presentation merits the following clinical impressions and recommendations:    1. Seropositive erosive rheumatoid arthritis (HCC)  Clinically and serologically quiescent in remission on the current regimen of Enbrel, so reasonable to de-escalate her immunosuppression by extending the interval between injections as noted below. However, given the potential for smoldering disease activity with limited clinical manifestations, need to occasionally reassess serologic markers of disease activity and risk stratification to gauge overall trajectory.  - CRP QUANTITIVE (NON-CARDIAC); Future  - Sed Rate; Future  - Decrease the frequency of Enbrel 50 mg SC every week to every 2 weeks    2. Synovial cyst of right wrist  Resultant manifestation of old damage from her RA when it was quite active, but presently stable.  - Consider ultrasound-guided aspiration depending on her clinical trajectory    3. 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  - Consider repeat testing depending on clinical trajectory    4. Immunosuppressed status (HCC)  High risk immunosuppressant use requiring routine monitoring labs prior to every visit to assess for possible side effects including but not limited to myelotoxicity, hepatotoxicity, and opportunistic infections.  - Quantiferon Gold Plus TB (4 tube); Future  - CBC WITH DIFFERENTIAL; Future  - Comp Metabolic Panel; Future  - Need to ascertain age-appropriate vaccines in addition to the ones listed above under immunizations      The above assessment and plan were discussed with the patient who acknowledged understanding of the plan.    FOLLOW-UP: Return in about 10 months (around 12/13/2025) for Short.         Thank you for the opportunity to participate in the care of Isabell Lopez.    Nick Multani MD, MS, FACR  Rheumatologist, [facility] Rheumatology, [facility]   of Clinical Medicine, Department of Internal Medicine  [facility]

## 2025-05-06 DIAGNOSIS — M05.80 SEROPOSITIVE EROSIVE RHEUMATOID ARTHRITIS (HCC): ICD-10-CM

## 2025-05-07 NOTE — TELEPHONE ENCOUNTER
Received request via: Pharmacy    Was the patient seen in the last year in this department? Yes    Does the patient have an active prescription (recently filled or refills available) for medication(s) requested? No    [facility]    Does the patient have correction Plus and need 100-day supply? (This applies to ALL medications) Patient does not have SCP

## 2025-05-08 ENCOUNTER — HOSPITAL ENCOUNTER (OUTPATIENT)
Dept: RADIOLOGY | Facility: MEDICAL CENTER | Age: 50
End: 2025-05-08
Attending: FAMILY MEDICINE
Payer: COMMERCIAL

## 2025-05-08 DIAGNOSIS — Z80.2 FAMILY HISTORY OF MALIGNANT NEOPLASM OF OTHER RESPIRATORY AND INTRATHORACIC ORGANS: ICD-10-CM

## 2025-05-08 DIAGNOSIS — Z82.49 FAMILY HISTORY OF ISCHEMIC HEART DISEASE: ICD-10-CM

## 2025-05-08 PROCEDURE — 76700 US EXAM ABDOM COMPLETE: CPT

## 2025-05-10 RX ORDER — MEDROXYPROGESTERONE ACETATE 150 MG/ML
1 INJECTION, SUSPENSION INTRAMUSCULAR
Qty: 4 ML | Refills: 5 | Status: SHIPPED | OUTPATIENT
Start: 2025-05-10

## 2025-05-12 ENCOUNTER — HOSPITAL ENCOUNTER (OUTPATIENT)
Dept: RADIOLOGY | Facility: MEDICAL CENTER | Age: 50
End: 2025-05-12
Attending: FAMILY MEDICINE
Payer: COMMERCIAL

## 2025-05-12 DIAGNOSIS — M06.9 RHEUMATOID ARTHRITIS INVOLVING MULTIPLE SITES, UNSPECIFIED WHETHER RHEUMATOID FACTOR PRESENT (HCC): ICD-10-CM

## 2025-05-12 PROCEDURE — 77080 DXA BONE DENSITY AXIAL: CPT

## 2025-07-09 NOTE — Clinical Note
REFERRAL APPROVAL NOTICE         Sent on July 9, 2025                   Isabell Lopez  [address]  [place] [address]                   Dear Ms. Lopez,    After a careful review of the medical information and benefit coverage, [facility] has processed your referral. See below for additional details.    If applicable, you must be actively enrolled with your insurance for coverage of the authorized service. If you have any questions regarding your coverage, please contact your insurance directly.    REFERRAL INFORMATION   Referral #:  [id number]  Referred-To Department    Referred-By Provider:  Surgical Oncology    José Miguel Hightower M.D.   [facility]      [address]  [address]  [place] [address]  [phone] [address]  [place] [id number] [phone]    Referral Start Date:  07/09/2025  Referral End Date:   07/09/2026           SCHEDULING  If you do not already have an appointment, please call [phone] to make an appointment.   MORE INFORMATION  As a reminder, [facility] ownership has changed, meaning this location is now owned and operated by [facility]. As such, we want to clarify that our patients should expect to receive two separate bills for the services received at [facility] - one representing the [facility] facility fees as the owner of the establishment, and the other to represent the physician's services and subsequent fees. You can speak with your insurance carrier for a pricing estimate by calling the customer service number on the back of your card and ask about charges for a hospital outpatient visit.  If you do not already have a [employer] account, sign up at: [email or web address]  You can access your medical information, make appointments, see lab results, billing information, and more.  If you have questions regarding this referral, please contact  the [facility] Referrals department at:              [phone]. Monday - Friday 7:30AM - 5:00PM.      Sincerely,  [facility]

## 2025-07-29 ENCOUNTER — HOSPITAL ENCOUNTER (OUTPATIENT)
Facility: MEDICAL CENTER | Age: 50
End: 2025-07-29
Attending: FAMILY MEDICINE
Payer: COMMERCIAL

## 2025-07-29 VITALS — WEIGHT: 140 LBS | BODY MASS INDEX: 20.04 KG/M2 | HEIGHT: 70 IN

## 2025-07-29 DIAGNOSIS — Z15.01 GENETIC SUSCEPTIBILITY TO MALIGNANT NEOPLASM OF BREAST: ICD-10-CM

## 2025-07-29 DIAGNOSIS — Z80.3 FAMILY HISTORY OF MALIGNANT NEOPLASM OF BREAST: ICD-10-CM

## 2025-07-29 DIAGNOSIS — Z98.82 H/O BREAST AUGMENTATION: ICD-10-CM

## 2025-07-29 DIAGNOSIS — Z12.31 SCREENING MAMMOGRAM FOR BREAST CANCER: ICD-10-CM

## 2025-07-29 PROCEDURE — 77063 BREAST TOMOSYNTHESIS BI: CPT

## 2025-07-29 ASSESSMENT — FIBROSIS 4 INDEX: FIB4 SCORE: 0.57

## 2025-07-30 ENCOUNTER — OFFICE VISIT (OUTPATIENT)
Age: 50
End: 2025-07-30
Payer: COMMERCIAL

## 2025-07-30 VITALS
WEIGHT: 143.6 LBS | BODY MASS INDEX: 20.56 KG/M2 | SYSTOLIC BLOOD PRESSURE: 93 MMHG | DIASTOLIC BLOOD PRESSURE: 67 MMHG | OXYGEN SATURATION: 98 % | HEART RATE: 79 BPM | HEIGHT: 70 IN | TEMPERATURE: 97.4 F

## 2025-07-30 DIAGNOSIS — Z15.09 RAD51D GENE MUTATION POSITIVE: Primary | ICD-10-CM

## 2025-07-30 DIAGNOSIS — Z15.01 RAD51D GENE MUTATION POSITIVE: Primary | ICD-10-CM

## 2025-07-30 ASSESSMENT — ENCOUNTER SYMPTOMS
ENDOCRINE NEGATIVE: 1
PSYCHIATRIC NEGATIVE: 1
CARDIOVASCULAR NEGATIVE: 1
GASTROINTESTINAL NEGATIVE: 1
NEUROLOGICAL NEGATIVE: 1
HEMATOLOGIC/LYMPHATIC NEGATIVE: 1
RESPIRATORY NEGATIVE: 1
EYES NEGATIVE: 1
CONSTITUTIONAL NEGATIVE: 1
MUSCULOSKELETAL NEGATIVE: 1

## 2025-07-30 ASSESSMENT — FIBROSIS 4 INDEX: FIB4 SCORE: 0.57

## 2025-07-31 NOTE — PROGRESS NOTES
"    Subjective:   2025  6:52 AM    Imaging facility:  [facility]  PCP:  José Miguel Hightower MD ([facility] [place])  Rheumatologist:  Dr. Nick Multani ([facility] [place])  GYN oncologist:  Dr. Manas Pagan  Plastic surgeon:  Dr. Tad Miranda    Chief Complaint:   Chief Complaint   Patient presents with    New Patient     Genetic mutation & discuss surgery.        History of presenting illness:  This delightful 50 y.o. year old self-identified -American/ female is kindly referred for consultation regarding a RAD51D genetic mutation in the setting of four maternal aunts with breast cancer (one also confirmed to have the same mutation).      No prior breast biopsies.  She had augmentation done in  with recent migration of the right implant causing asymmetry.  She met with Dr. Miranda to discuss replacement and is now strongly considering prophylactic bilateral total mastectomies (pBTM) given her family history and genetic testing.      LABS:  No results found for: \"HBA1C\"     Problem List[1]    Past Surgical History[2]    Allergies[3]    Current Outpatient Medications   Medication Sig    Etanercept (ENBREL SURECLICK) 50 MG/ML Solution Auto-injector Inject 1 Pen under the skin every 14 days.       Social History[4]     Social Hx - Family and Occupational[5]    Family History   Problem Relation Age of Onset    Cancer Mother         Lymphoma    Cancer Father         throat/prostate    Breast Cancer Maternal Aunt     Breast Cancer Maternal Aunt     Breast Cancer Maternal Aunt     Breast Cancer Maternal Aunt     Breast Cancer Paternal Aunt     Other Paternal Aunt         MS    Rheumatologic Disease Paternal Aunt     Breast Cancer Paternal Aunt     Rheumatologic Disease Paternal Aunt     Arthritis Paternal Aunt         RA    Breast Cancer Maternal Grandmother        OB History    Para Term  AB Living   3 3 2 1 0 3   SAB IAB Ectopic Molar Multiple Live Births   0 0 0 0 0 3   Obstetric Comments   Age of first delivery: 21 " "  Menarche: 14   LMP:07/27/2025   No HRT       Review of Systems   Constitutional: Negative.    HENT:  Negative.     Eyes: Negative.    Respiratory: Negative.     Cardiovascular: Negative.    Gastrointestinal: Negative.    Endocrine: Negative.    Genitourinary: Negative.     Musculoskeletal: Negative.    Skin: Negative.    Neurological: Negative.    Hematological: Negative.    Psychiatric/Behavioral: Negative.         Imaging reviewed today:  Screening mammo 7/29/25 [facility] (not yet formally read by Radiology):  Extremely dense.  No prior mammo available at this time.     No evident abnormalities on my read.       Objective:   BP 93/67 (BP Location: Left arm, Patient Position: Sitting, BP Cuff Size: Large adult)   Pulse 79   Temp 36.3 °C (97.4 °F) (Temporal)   Ht 1.778 m (5' 10\")   Wt 65.1 kg (143 lb 9.6 oz)   LMP 07/27/2025 (Exact Date)   SpO2 98%   BMI 20.60 kg/m²       Physical Exam  Constitutional:       Appearance: Normal appearance.   Cardiovascular:      Rate and Rhythm: Normal rate and regular rhythm.      Heart sounds: Normal heart sounds.   Pulmonary:      Effort: Pulmonary effort is normal.      Breath sounds: Normal breath sounds.   Skin:     Comments: 34 DD.  L > R (chronic).  RIGHT capsular contracture with visile implant deformity , but without suspicious palpable findings.  No erythema, edema, dimpling, or other skin changes.  No palpable adenopathy or breast mass on either side.  See scanned breast diagram   Neurological:      Mental Status: She is alert.   Psychiatric:         Mood and Affect: Mood normal.         Behavior: Behavior normal.         FUNCTIONAL ASSESSMENT  Patient responses to questions:    Have you ever had shoulder surgery or been treated for a shoulder injury that resulted in a loss of range of motion?  No     Are you unable to reach into an upper cabinet and retrieve a cup or plate?  No     Do you have any limitations in daily activities because of your shoulder?  No "     Overhead raise test for shoulder flexion:  Normal Range:  150-180 degrees    Diagnosis:     1. 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  Dr. Katja Leyva.      Breast augmentation  (subglandular, silicone, Dr. Alex Tan).      Last bilateral mammogram 25 [facility] (not yet formally read by Radiology):  Extremely dense.  No prior mammo available at this time.    Reports a normal breast MRI RDC around .      FH:  Four maternal aunts (out of 8), MGM, and two paternal aunts with breast cancer.  Father with prostate, throat, bone cancer.  Mother with lymphoma.     VOLPARA risk calculation (see scanned document) 25:     Lifetime breast cancer risk:  8.91 - 22.17%.      Menopausal/HRT status:  .  Age of first delivery: 21  .     Menarche: 14   LMP:2025   No HRT     LIFESTYLE:  1 alcohol per week. No smoking/vaping or drug history.  BMI 20.      ECOG 0.    Rheumatoid arthritis.  Well managed on Enbrel, considering decreasing frequency of medication.      DISCUSSED:  I personally and independently reviewed her breast imaging, available genetics reports, and relevant outside records.  Today's discussion is limited to breast cancer risk management from a surgical perspective.  Other recommendations are deferred to her PCP and other specialists.      Per NCCN guidelines, RAD51D is associated with an absolute risk of developing breast cancer around 20%.  Recommendations are for annual mammograms and consideration of breast MRI starting age 40.  Decisions regarding prophylactic breast surgery are individualized based on family history and patient preferences, but not routinely recommended.  Contralateral breast cancer risk is felt to be the same as that of sporadic breast cancer (<2% at 10 years).  Family  history is notable, but only one aunt had confirmed genetic testing.       She has a strong desire to reduce future risk for breast cancer and feels she is ready to proceed with pBTM.  For patients with a high degree of motivation to undergo preventive surgery due to strong family history, high estimated lifetime risk for breast cancer, and/or other personal reasons, bilateral total mastectomies can be considered, though remain highly personal choices.  We also recognize that this is not an easy decision for many patients, so until she has decided on and completed surgery, I would advise high risk screening in the interim.      We discussed that there are risks and benefits of bilateral total mastectomies including surgical complications and emotional sequelae that can be underestimated, so the purpose of today's discussion is to provide sufficient information to meet her current needs.        Bilateral total mastectomies (BTM) would provide maximum risk reduction for development of future primary breast cancers, though the risk is not zero, and cancer can still develop, though with a lower likelihood.  There is also the advantage of eliminating screening breast imaging such as mammograms and MRI.  Self breast monitoring is critical, however, and statistically, a small percentage of patients will still develop breast cancer following BTM.  We aim to balance risk reduction with quality of life and emotional well being from aesthetic outcomes.  I explained the risks and benefits of BTM including bleeding, infection, chronic pain including phantom pain, and other sequelae including loss of nipple and skin sensation which can affect sexual intimacy, and/or other undesired consequences.  We discussed typical postoperative expectations and restrictions.  There are also the emotional and physical aspects of total mastectomy which can be difficult for some patients with regard to body image, intimacy, and symmetry.   Reconstruction can help mitigate some of the feelings of loss following total mastectomy.  She has already established care with Dr. Miranda.    We discussed the risks and benefits of nipple sparing mastectomies (NSM), including risk of nipple necrosis or other reasons that could require excision of her nipple areolar complex (NAC).  She would need a lateral incision.  If excised, nipple reconstruction can be performed later if needed.  Additional options include tattoo, 3D coloration, among others.  She states that she is not emotionally attached to her NAC and is more concerned about healing well and overall health.  She feels she would prefer to be smaller, but is also trying to respect her 's desires as well.      We then reviewed her risk factors for complications.  Unfortunately, the medication for her RA would need to be discontinued for a period of time; historically this is around 4 weeks or longer, but we would need to get more specific recommendations from her rheumatologist.  She mentions that she is already nervous about increasing her interval for doses.   Reconstruction following total mastectomy has a naturally higher rate of complications already, so she would need to feel sufficiently motivated to proceed with prophylactic surgery.  There may also be increased risk of infection, and she notes that she did suffer a severe infection following foot surgery about a year ago.  Given her presentation in entirety, it is not clear whether she feels ready to proceed with pBTM at this time.  NCCN guidelines support individualized decision making in this setting.     She will consider her options further, and may choose to proceed with her preventive pelvic surgery and replacement of implant separately.  If she does not decide to proceed with breast surgery in the interim, I advised her to return here for clinical breast exam in January with our high breast risk ELIANE.  She just had her screening mammogram  in July, and a breast MRI in June.  We will try to track down that result and when she next returns, we can decide how to move forward with screening imaging schedule and exams.  Much may also depend on when she proceeds with her implant replacement.      Total time spent by me today, including personal review and independent interpretation of available breast imaging, outside records, face to face time (over 35 min for this alone), chart documentation, and , was 75 minutes.     PRESCRIPTION FOR CONTROLLED SUBSTANCE:  This patient may develop post-procedure pain that could exceed the capabilities of non-opioid medications such as acetaminophen, ibuprofen, or naproxen.  This patient may also require an anxiolytic for pre-procedure anxiety.  There are no sufficient alternatives to this medication that are currently available.  The patient understands that the purpose of opioid medication is to improve initial post-procedure symptoms, then transition to alternative forms of treatment, and is not intended for long-term use.  The patient has been advised not to use the controlled substances with alcohol, marijuana products, or other illicit drugs.  I have reviewed available medical records from this patient's other providers and have directed my staff to obtain pertinent medical records as we are made aware.  After performing my evaluation and risk assessment, I feel that the controlled substances to be prescribed are appropriate.  Drug utilization report is reviewed prior to written prescriptions.  The patient was given the opportunity to ask further questions.      PLAN:  High risk breast cancer screening with annual mammograms and annual MRI, clinical breast exams twice a year with our high risk ELIANE, self breast monitoring. Next imaging test depends on timing of implant replacement.     If she decides to proceed with pBTM, she will let us know and we will then work on logistics of reconstruction.  She was  most comfortable with non-nipple sparing TM.           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)    Long term current use of systemic steroids    Bunion, left foot    Pain of left foot    Synovial cyst of right wrist    RAD51D gene mutation positive   [2]   Past Surgical History:  Procedure Laterality Date    WOUND IRRIGATION & DEBRIDEMENT Left 2023    Procedure: FOOT IRRIGATION AND DEBRIDEMENT;  Surgeon: Roby Espinosa M.D.;  Location: [facility];  Service: Orthopedics    HARDWARE REMOVAL ORTHO Left 2023    Procedure: REMOVAL, HARDWARE;  Surgeon: Roby Espinosa M.D.;  Location: [facility];  Service: Orthopedics    PB FUSION BIG TOE,MT-P JT Left 2023    Procedure: LEFT REVISION FIRST METATARSALPHALANGEAL JOINT FUSION;  Surgeon: Roby Espinosa M.D.;  Location: [facility];  Service: Orthopedics    PB REMV BONE FOR GRAFT MINOR Left 2023    Procedure: LEFT CALCANEAL BONE GRAFT;  Surgeon: Roby Espinosa M.D.;  Location: [facility];  Service: Orthopedics    PB REMOVAL DEEP IMPLANT Left 2023    Procedure: LEFT FIRST METATARSALPHALANGEAL JOINT HARDWARE REMOVAL;  Surgeon: Roby Espinosa M.D.;  Location: [facility];  Service: Orthopedics    PB FUSION BIG TOE,MT-P JT Left 2022    Procedure: LEFT FOOT FIRST METATARSOPHALANGEAL JOINT FUSION, LEFT RHEUMATOID FOREFOOT RECONSTRUCTION, REPAIRS AS INDICATED.;  Surgeon: Roby Espinosa M.D.;  Location: [facility];  Service: Orthopedics    TOE FUSION Right 12/16/2019    Procedure: FUSION, JOINT, TOE - FIRST METATARSOPHALANGEAL JOINT;  Surgeon: Roby Espinosa M.D.;  Location: [facility]ERY [facility];  Service: Orthopedics    FOOT RECONSTRUCTION RHEUMATIC Right 12/16/2019    Procedure: RECONSTRUCTION, FOOT, FOR RHEUMATOID ARTHRITIS - FOREFOOT;  Surgeon: Roby Espinosa M.D.;  Location: SUR[facility]ERY [facility];  Service: Orthopedics    ANKLE ARTHROSCOPY Right 12/16/2019    Procedure: ARTHROSCOPY, ANKLE;  Surgeon: Roby Espinosa M.D.;  Location: SURGERY [facility];  Service: Orthopedics    PRIMARY C SECTION  01/07/2019    Procedure: PRIMARY C SECTION;  Surgeon: Carlee Long M.D.;  Location: LABOR AND DELIVERY;  Service: Labor and Delivery    MAMMOPLASTY AUGMENTATION  07/12/2011    Performed by MARIBELL GONZALES at SURGERY [facility]   [3] No Known Allergies  [4]   Social History  Tobacco Use    Smoking status: Never     Passive exposure: Never    Smokeless tobacco: Never   Vaping Use    Vaping status: Never Used   Substance Use Topics    Alcohol use: Yes     Alcohol/week: 0.6 oz     Types: 1 Glasses of wine per week     Comment: 1/week    Drug use: Never   [5]   Family and Occupational History  Socioeconomic History    Marital status:      Spouse name: Not on file    Number of children: Not on file    Years of education: Not on file    Highest education level: Not on file   Occupational History    Not on file

## 2025-08-04 ENCOUNTER — HOSPITAL ENCOUNTER (OUTPATIENT)
Dept: RADIOLOGY | Facility: MEDICAL CENTER | Age: 50
End: 2025-08-04
Attending: FAMILY MEDICINE
Payer: COMMERCIAL

## (undated) DEVICE — CHLORAPREP 26 ML APPLICATOR - ORANGE TINT(25/CA)

## (undated) DEVICE — SENSOR SPO2 NEO LNCS ADHESIVE (20/BX) SEE USER NOTES

## (undated) DEVICE — SET EXTENSION WITH 2 PORTS (48EA/CA) ***PART #2C8610 IS A SUBSTITUTE*****

## (undated) DEVICE — SUTURE 3-0 VICRYL PLUS SH - 8X 18 INCH (12/BX)

## (undated) DEVICE — DRESSING INTERCEED ABSORBABLE ADHESION BARRIER TC7 (10EA/CA)

## (undated) DEVICE — PADDING CAST 4 IN X 4 YDS - SOF-ROLL (12RL/BG 6BG/CT)

## (undated) DEVICE — TUBING CLEARLINK DUO-VENT - C-FLO (48EA/CA)

## (undated) DEVICE — BLOCK

## (undated) DEVICE — DRAPE LARGE 3 QUARTER - (20/CA)

## (undated) DEVICE — TUBE CONNECTING SUCTION - CLEAR PLASTIC STERILE 72 IN (50EA/CA)

## (undated) DEVICE — COVER LIGHT HANDLE ALC PLUS DISP (18EA/BX)

## (undated) DEVICE — GOWN SURGEONS X-LARGE - DISP. (30/CA)

## (undated) DEVICE — WRAP CO-FLEX 4IN X 5YD STERIL - SELF-ADHERENT (18/CA)

## (undated) DEVICE — DRAPE 36X28IN RAD CARM BND BG - (25/CA) O

## (undated) DEVICE — SUCTION INSTRUMENT YANKAUER BULBOUS TIP W/O VENT (50EA/CA)

## (undated) DEVICE — BLADE SURGICAL #15 - (50/BX 3BX/CA)

## (undated) DEVICE — DRESSING ABDOMINAL PAD STERILE 8 X 10" (360EA/CA)"

## (undated) DEVICE — SET LEADWIRE 5 LEAD BEDSIDE DISPOSABLE ECG (1SET OF 5/EA)

## (undated) DEVICE — SODIUM CHL. IRRIGATION 0.9% 3000ML (4EA/CA 65CA/PF)

## (undated) DEVICE — SHAVER 3.5 AGGRESSIVE + FORMLA (5EA/SP)

## (undated) DEVICE — LACTATED RINGERS INJ 1000 ML - (14EA/CA 60CA/PF)

## (undated) DEVICE — PACK C-SECTION (2EA/CA)

## (undated) DEVICE — SUTURE 3-0 ETHILON PS-1 (36PK/BX)

## (undated) DEVICE — GLOVE BIOGEL ECLIPSE PF LATEX SIZE 8.0  (50PR/BX)

## (undated) DEVICE — BLADE SAGITTAL SAW 9.4MM X 25.5MM X .4MM FINE TOOTH (1/EA)

## (undated) DEVICE — ELECTRODE DUAL RETURN W/ CORD - (50/PK)

## (undated) DEVICE — POUCH FLUID COLLECTION INVISISHIELD - (10/BX)

## (undated) DEVICE — WATER IRRIG. STER. 1500 ML - (9/CA)

## (undated) DEVICE — TRAY SPINAL ANESTHESIA NON-SAFETY (10/CA)

## (undated) DEVICE — KIT ROOM DECONTAMINATION

## (undated) DEVICE — TOURNIQUET CUFF 24 X 4 ONE PORT - STERILE (10/BX)

## (undated) DEVICE — CLOSURE PRINEO SKIN - (2EA/BX)

## (undated) DEVICE — TRAY BLADDER CARE W/ 16 FR FOLEY CATHETER STATLOCK  (10/CA)

## (undated) DEVICE — CLOSURE SKIN STRIP 1/2 X 4 IN - (STERI STRIP) (50/BX 4BX/CA)

## (undated) DEVICE — BLANKET UNDERBODY FULL ACCES - (5/CA)

## (undated) DEVICE — PADDING CAST 6 IN STERILE - 6 X 4 YDS (24/CA)

## (undated) DEVICE — GOWN WARMING STANDARD FLEX - (30/CA)

## (undated) DEVICE — PADDING CAST 4 IN STERILE - 4 X 4 YDS (24/CA)

## (undated) DEVICE — ELECTRODE 850 FOAM ADHESIVE - HYDROGEL RADIOTRNSPRNT (50/PK)

## (undated) DEVICE — GLOVE BIOGEL PI INDICATOR SZ 6.5 SURGICAL PF LF - (50/BX 4BX/CA)

## (undated) DEVICE — STOCKINET BIAS 6 IN STERILE - (20/CA)

## (undated) DEVICE — PACK LOWER EXTREMITY - (2/CA)

## (undated) DEVICE — BANDAGE ELASTIC 6 HONEYCOMB - 6X5YD LF (20/CA)"

## (undated) DEVICE — SENSOR OXIMETER ADULT SPO2 RD SET (20EA/BX)

## (undated) DEVICE — Device

## (undated) DEVICE — CONTAINER, SPECIMEN, STERILE

## (undated) DEVICE — GLOVE BIOGEL PI INDICATOR SZ 8.0 SURGICAL PF LF -(50/BX 4BX/CA)

## (undated) DEVICE — NEEDLE SAFETY 18 GA X 1 1/2 IN (100EA/BX)

## (undated) DEVICE — CANISTER SUCTION 3000ML MECHANICAL FILTER AUTO SHUTOFF MEDI-VAC NONSTERILE LF DISP  (40EA/CA)

## (undated) DEVICE — PROTECTOR ULNA NERVE - (36PR/CA)

## (undated) DEVICE — DRESSING 3X8 ADAPTIC GAUZE - NON-ADHERING (36/PK 6PK/BX)

## (undated) DEVICE — PLUMEPEN ULTRA 3/8 IN X 10 FT HOSE (20EA/CA)

## (undated) DEVICE — SLEEVE VASO CALF MED - (10PR/CA)

## (undated) DEVICE — SLEEVE, VASO, THIGH, MED

## (undated) DEVICE — 0 CHROMIC CT-1

## (undated) DEVICE — WATER IRRIGATION STERILE 1000ML (12EA/CA)

## (undated) DEVICE — TAPE CLOTH MEDIPORE 6 INCH - (12RL/CA)

## (undated) DEVICE — SCRUB SOLUTION EXIDINE 4% 40Z - 48/CS CHLORAHEXADINE GLUCONATE

## (undated) DEVICE — SLEEVE, SEQUENTIAL CALF REG

## (undated) DEVICE — SOD. CHL. INJ. 0.9% 1000 ML - (14EA/CA 60CA/PF)

## (undated) DEVICE — TUBING PATIENT W/CONNECTOR REDEUCE (1EA)

## (undated) DEVICE — GLOVE BIOGEL PI INDICATOR SZ 7.0 SURGICAL PF LF - (50/BX 4BX/CA)

## (undated) DEVICE — SUTURE GENERAL

## (undated) DEVICE — HEAD HOLDER JUNIOR/ADULT

## (undated) DEVICE — SUTURE 4-0 27IN VCRL PLUS ANTI (36PK/BX)

## (undated) DEVICE — GLOVE SZ 6.5 BIOGEL PI MICRO - PF LF (50PR/BX)

## (undated) DEVICE — TOWEL STOP TIMEOUT SAFETY FLAG (40EA/CA)

## (undated) DEVICE — DRAPE ARTHROSCOPE (ACL) - (10/CA)

## (undated) DEVICE — SODIUM CHL IRRIGATION 0.9% 1000ML (12EA/CA)

## (undated) DEVICE — DRAPE C-ARM LARGE 41IN X 74 IN - (10/BX 2BX/CA)

## (undated) DEVICE — CUP DENTURE W/ LID - (200/CA)

## (undated) DEVICE — BANDAGE ELASTIC 4 HONEYCOMB - 4"X5YD LF (20/CA)"

## (undated) DEVICE — SYRINGE 30 ML LL (56/BX)

## (undated) DEVICE — TUBING PUMP WITH CONNECTOR REDEUCE (1EA)

## (undated) DEVICE — MASK ANESTHESIA ADULT  - (100/CA)

## (undated) DEVICE — TOWELS CLOTH SURGICAL - (4/PK 20PK/CA)

## (undated) DEVICE — SPONGE GAUZE NON-STERILE 4X4 - (2000/CA 10PK/CA)

## (undated) DEVICE — KIT ANESTHESIA W/CIRCUIT & 3/LT BAG W/FILTER (20EA/CA)

## (undated) DEVICE — DETERGENT RENUZYME PLUS 10 OZ PACKET (50/BX)

## (undated) DEVICE — PAD LAP STERILE 18 X 18 - (5/PK 40PK/CA)

## (undated) DEVICE — SUTURE 3-0 VICRYL PLUS CT-1 - 36 INCH (36/BX)

## (undated) DEVICE — SUTURE 0 36IN PDS + VIO CT-1 (36PK/BX)

## (undated) DEVICE — MASK, LARYNGEAL AIRWAY #4

## (undated) DEVICE — KIT SYR LS LNDRW FLTRPRO DEV - (200/CA)

## (undated) DEVICE — SUTURE 3-0 ETHILON FS-1 - (36/BX) 30 INCH

## (undated) DEVICE — SPLINT PLASTER 5 IN X 30 IN - (50EA/BX 6BX/CA)

## (undated) DEVICE — NEPTUNE 4 PORT MANIFOLD - (20/PK)

## (undated) DEVICE — CATHETER IV 18 GA X 1-1/4 - SAFETY BRAUN (50/BX)

## (undated) DEVICE — GLOVE SZ 7 BIOGEL PI MICRO - PF LF (50PR/BX 4BX/CA)

## (undated) DEVICE — KIT  I.V. START (100EA/CA)

## (undated) DEVICE — BOVIE BLADE COATED - (50/PK)